# Patient Record
Sex: FEMALE | Race: WHITE | Employment: OTHER | URBAN - METROPOLITAN AREA
[De-identification: names, ages, dates, MRNs, and addresses within clinical notes are randomized per-mention and may not be internally consistent; named-entity substitution may affect disease eponyms.]

---

## 2022-12-28 ENCOUNTER — HOSPITAL ENCOUNTER (INPATIENT)
Age: 82
LOS: 4 days | Discharge: HOME | DRG: 292 | End: 2023-01-01
Attending: EMERGENCY MEDICINE | Admitting: INTERNAL MEDICINE
Payer: MEDICARE

## 2022-12-28 ENCOUNTER — APPOINTMENT (OUTPATIENT)
Dept: GENERAL RADIOLOGY | Age: 82
DRG: 292 | End: 2022-12-28
Payer: MEDICARE

## 2022-12-28 DIAGNOSIS — I50.9 NEW ONSET OF CONGESTIVE HEART FAILURE (HCC): Primary | ICD-10-CM

## 2022-12-28 DIAGNOSIS — I50.21 ACUTE SYSTOLIC CHF (CONGESTIVE HEART FAILURE), NYHA CLASS 4 (HCC): ICD-10-CM

## 2022-12-28 DIAGNOSIS — I48.91 ATRIAL FIBRILLATION, UNSPECIFIED TYPE (HCC): ICD-10-CM

## 2022-12-28 DIAGNOSIS — J90 PLEURAL EFFUSION: ICD-10-CM

## 2022-12-28 LAB
ALBUMIN SERPL-MCNC: 3.5 G/DL (ref 3.2–4.6)
ALBUMIN/GLOB SERPL: 1.1 {RATIO} (ref 0.4–1.6)
ALP SERPL-CCNC: 90 U/L (ref 50–136)
ALT SERPL-CCNC: 22 U/L (ref 12–65)
ANION GAP SERPL CALC-SCNC: 8 MMOL/L (ref 2–11)
APPEARANCE UR: CLEAR
APTT PPP: 35.8 SEC (ref 24.5–34.2)
AST SERPL-CCNC: 32 U/L (ref 15–37)
BASOPHILS # BLD: 0.2 K/UL (ref 0–0.2)
BASOPHILS NFR BLD: 1 % (ref 0–2)
BILIRUB SERPL-MCNC: 2 MG/DL (ref 0.2–1.1)
BILIRUB UR QL: NEGATIVE
BUN SERPL-MCNC: 11 MG/DL (ref 8–23)
CALCIUM SERPL-MCNC: 9.2 MG/DL (ref 8.3–10.4)
CHLORIDE SERPL-SCNC: 108 MMOL/L (ref 101–110)
CO2 SERPL-SCNC: 25 MMOL/L (ref 21–32)
COLOR UR: NORMAL
CREAT SERPL-MCNC: 1.3 MG/DL (ref 0.6–1)
DIFFERENTIAL METHOD BLD: ABNORMAL
EKG DIAGNOSIS: NORMAL
EKG Q-T INTERVAL: 382 MS
EKG QRS DURATION: 96 MS
EKG QTC CALCULATION (BAZETT): 477 MS
EKG R AXIS: 90 DEGREES
EKG T AXIS: -5 DEGREES
EKG VENTRICULAR RATE: 94 BPM
EOSINOPHIL # BLD: 0.2 K/UL (ref 0–0.8)
EOSINOPHIL NFR BLD: 1 % (ref 0.5–7.8)
ERYTHROCYTE [DISTWIDTH] IN BLOOD BY AUTOMATED COUNT: 15.5 % (ref 11.9–14.6)
ERYTHROCYTE [DISTWIDTH] IN BLOOD BY AUTOMATED COUNT: 16.2 % (ref 11.9–14.6)
GLOBULIN SER CALC-MCNC: 3.3 G/DL (ref 2.8–4.5)
GLUCOSE SERPL-MCNC: 102 MG/DL (ref 65–100)
GLUCOSE UR STRIP.AUTO-MCNC: NEGATIVE MG/DL
HCT VFR BLD AUTO: 47.3 % (ref 35.8–46.3)
HCT VFR BLD AUTO: 47.5 % (ref 35.8–46.3)
HGB BLD-MCNC: 15 G/DL (ref 11.7–15.4)
HGB BLD-MCNC: 15.1 G/DL (ref 11.7–15.4)
HGB UR QL STRIP: NEGATIVE
IMM GRANULOCYTES # BLD AUTO: 0.2 K/UL (ref 0–0.5)
IMM GRANULOCYTES NFR BLD AUTO: 1 % (ref 0–5)
INR PPP: 1.4
KETONES UR QL STRIP.AUTO: NEGATIVE MG/DL
LEUKOCYTE ESTERASE UR QL STRIP.AUTO: NEGATIVE
LYMPHOCYTES # BLD: 1.5 K/UL (ref 0.5–4.6)
LYMPHOCYTES NFR BLD: 10 % (ref 13–44)
MAGNESIUM SERPL-MCNC: 2.1 MG/DL (ref 1.8–2.4)
MCH RBC QN AUTO: 29.8 PG (ref 26.1–32.9)
MCH RBC QN AUTO: 30.1 PG (ref 26.1–32.9)
MCHC RBC AUTO-ENTMCNC: 31.7 G/DL (ref 31.4–35)
MCHC RBC AUTO-ENTMCNC: 31.8 G/DL (ref 31.4–35)
MCV RBC AUTO: 93.9 FL (ref 82–102)
MCV RBC AUTO: 94.8 FL (ref 82–102)
MONOCYTES # BLD: 1.5 K/UL (ref 0.1–1.3)
MONOCYTES NFR BLD: 10 % (ref 4–12)
NEUTS SEG # BLD: 11.6 K/UL (ref 1.7–8.2)
NEUTS SEG NFR BLD: 77 % (ref 43–78)
NITRITE UR QL STRIP.AUTO: NEGATIVE
NRBC # BLD: 0 K/UL (ref 0–0.2)
NRBC # BLD: 0 K/UL (ref 0–0.2)
NT PRO BNP: ABNORMAL PG/ML
PH UR STRIP: 5.5 [PH] (ref 5–9)
PLATELET # BLD AUTO: 520 K/UL (ref 150–450)
PLATELET # BLD AUTO: 606 K/UL (ref 150–450)
PLATELET COMMENT: ADEQUATE
PMV BLD AUTO: 11.1 FL (ref 9.4–12.3)
PMV BLD AUTO: 11.5 FL (ref 9.4–12.3)
POTASSIUM SERPL-SCNC: 3.7 MMOL/L (ref 3.5–5.1)
PROCALCITONIN SERPL-MCNC: 0.21 NG/ML (ref 0–0.49)
PROT SERPL-MCNC: 6.8 G/DL (ref 6.3–8.2)
PROT UR STRIP-MCNC: NEGATIVE MG/DL
PROTHROMBIN TIME: 17.2 SEC (ref 12.6–14.3)
RBC # BLD AUTO: 4.99 M/UL (ref 4.05–5.2)
RBC # BLD AUTO: 5.06 M/UL (ref 4.05–5.2)
RBC MORPH BLD: ABNORMAL
SODIUM SERPL-SCNC: 141 MMOL/L (ref 133–143)
SP GR UR REFRACTOMETRY: 1.01 (ref 1–1.02)
TROPONIN I SERPL HS-MCNC: 27 PG/ML (ref 0–14)
TSH, 3RD GENERATION: 0.65 UIU/ML (ref 0.36–3.74)
UROBILINOGEN UR QL STRIP.AUTO: 0.2 EU/DL (ref 0.2–1)
WBC # BLD AUTO: 15.2 K/UL (ref 4.3–11.1)
WBC # BLD AUTO: 16.2 K/UL (ref 4.3–11.1)
WBC MORPH BLD: ABNORMAL

## 2022-12-28 PROCEDURE — 96374 THER/PROPH/DIAG INJ IV PUSH: CPT | Performed by: EMERGENCY MEDICINE

## 2022-12-28 PROCEDURE — 71045 X-RAY EXAM CHEST 1 VIEW: CPT

## 2022-12-28 PROCEDURE — 99285 EMERGENCY DEPT VISIT HI MDM: CPT | Performed by: EMERGENCY MEDICINE

## 2022-12-28 PROCEDURE — 84443 ASSAY THYROID STIM HORMONE: CPT

## 2022-12-28 PROCEDURE — 84484 ASSAY OF TROPONIN QUANT: CPT

## 2022-12-28 PROCEDURE — 83880 ASSAY OF NATRIURETIC PEPTIDE: CPT

## 2022-12-28 PROCEDURE — 6360000002 HC RX W HCPCS: Performed by: PHYSICIAN ASSISTANT

## 2022-12-28 PROCEDURE — 85610 PROTHROMBIN TIME: CPT

## 2022-12-28 PROCEDURE — 85730 THROMBOPLASTIN TIME PARTIAL: CPT

## 2022-12-28 PROCEDURE — 80053 COMPREHEN METABOLIC PANEL: CPT

## 2022-12-28 PROCEDURE — 85025 COMPLETE CBC W/AUTO DIFF WBC: CPT

## 2022-12-28 PROCEDURE — 1100000000 HC RM PRIVATE

## 2022-12-28 PROCEDURE — 85027 COMPLETE CBC AUTOMATED: CPT

## 2022-12-28 PROCEDURE — 83735 ASSAY OF MAGNESIUM: CPT

## 2022-12-28 PROCEDURE — 84145 PROCALCITONIN (PCT): CPT

## 2022-12-28 PROCEDURE — 6360000002 HC RX W HCPCS: Performed by: INTERNAL MEDICINE

## 2022-12-28 PROCEDURE — 93005 ELECTROCARDIOGRAM TRACING: CPT | Performed by: PHYSICIAN ASSISTANT

## 2022-12-28 PROCEDURE — 81003 URINALYSIS AUTO W/O SCOPE: CPT

## 2022-12-28 RX ORDER — HEPARIN SODIUM 1000 [USP'U]/ML
60 INJECTION, SOLUTION INTRAVENOUS; SUBCUTANEOUS ONCE
Status: COMPLETED | OUTPATIENT
Start: 2022-12-28 | End: 2022-12-28

## 2022-12-28 RX ORDER — SODIUM CHLORIDE 9 MG/ML
INJECTION, SOLUTION INTRAVENOUS PRN
Status: DISCONTINUED | OUTPATIENT
Start: 2022-12-28 | End: 2023-01-01 | Stop reason: HOSPADM

## 2022-12-28 RX ORDER — ONDANSETRON 4 MG/1
4 TABLET, ORALLY DISINTEGRATING ORAL EVERY 8 HOURS PRN
Status: DISCONTINUED | OUTPATIENT
Start: 2022-12-28 | End: 2023-01-01 | Stop reason: HOSPADM

## 2022-12-28 RX ORDER — FUROSEMIDE 10 MG/ML
40 INJECTION INTRAMUSCULAR; INTRAVENOUS ONCE
Status: COMPLETED | OUTPATIENT
Start: 2022-12-28 | End: 2022-12-28

## 2022-12-28 RX ORDER — HEPARIN SODIUM 10000 [USP'U]/100ML
5-30 INJECTION, SOLUTION INTRAVENOUS CONTINUOUS
Status: DISCONTINUED | OUTPATIENT
Start: 2022-12-28 | End: 2022-12-31

## 2022-12-28 RX ORDER — SODIUM CHLORIDE 0.9 % (FLUSH) 0.9 %
5-40 SYRINGE (ML) INJECTION EVERY 12 HOURS SCHEDULED
Status: DISCONTINUED | OUTPATIENT
Start: 2022-12-28 | End: 2023-01-01 | Stop reason: HOSPADM

## 2022-12-28 RX ORDER — ONDANSETRON 2 MG/ML
4 INJECTION INTRAMUSCULAR; INTRAVENOUS EVERY 6 HOURS PRN
Status: DISCONTINUED | OUTPATIENT
Start: 2022-12-28 | End: 2023-01-01 | Stop reason: HOSPADM

## 2022-12-28 RX ORDER — FUROSEMIDE 10 MG/ML
40 INJECTION INTRAMUSCULAR; INTRAVENOUS DAILY
Status: DISCONTINUED | OUTPATIENT
Start: 2022-12-29 | End: 2022-12-29

## 2022-12-28 RX ORDER — ACETAMINOPHEN 325 MG/1
650 TABLET ORAL EVERY 6 HOURS PRN
Status: DISCONTINUED | OUTPATIENT
Start: 2022-12-28 | End: 2023-01-01 | Stop reason: HOSPADM

## 2022-12-28 RX ORDER — SODIUM CHLORIDE 0.9 % (FLUSH) 0.9 %
5-40 SYRINGE (ML) INJECTION PRN
Status: DISCONTINUED | OUTPATIENT
Start: 2022-12-28 | End: 2023-01-01 | Stop reason: HOSPADM

## 2022-12-28 RX ORDER — POLYETHYLENE GLYCOL 3350 17 G/17G
17 POWDER, FOR SOLUTION ORAL DAILY PRN
Status: DISCONTINUED | OUTPATIENT
Start: 2022-12-28 | End: 2023-01-01 | Stop reason: HOSPADM

## 2022-12-28 RX ORDER — ACETAMINOPHEN 650 MG/1
650 SUPPOSITORY RECTAL EVERY 6 HOURS PRN
Status: DISCONTINUED | OUTPATIENT
Start: 2022-12-28 | End: 2023-01-01 | Stop reason: HOSPADM

## 2022-12-28 RX ORDER — HEPARIN SODIUM 1000 [USP'U]/ML
60 INJECTION, SOLUTION INTRAVENOUS; SUBCUTANEOUS PRN
Status: DISCONTINUED | OUTPATIENT
Start: 2022-12-28 | End: 2022-12-31

## 2022-12-28 RX ORDER — HEPARIN SODIUM 1000 [USP'U]/ML
30 INJECTION, SOLUTION INTRAVENOUS; SUBCUTANEOUS PRN
Status: DISCONTINUED | OUTPATIENT
Start: 2022-12-28 | End: 2022-12-31

## 2022-12-28 RX ADMIN — FUROSEMIDE 40 MG: 10 INJECTION, SOLUTION INTRAMUSCULAR; INTRAVENOUS at 19:10

## 2022-12-28 RX ADMIN — HEPARIN SODIUM 3670 UNITS: 1000 INJECTION INTRAVENOUS; SUBCUTANEOUS at 23:00

## 2022-12-28 RX ADMIN — HEPARIN SODIUM 12 UNITS/KG/HR: 10000 INJECTION, SOLUTION INTRAVENOUS at 22:58

## 2022-12-28 NOTE — ED NOTES
Chief Complaint: [Leg swelling shortness of breath]     HPI: Patient to ER with approximate 2-month history of slowly worsening bilateral leg swelling. Today daughter noticed some swelling around her eyelids. She has been having progressively worsening shortness of breath. She denies any chest pain. Patient tried to see her primary care physician but states it has been more than a year they could not be seen in a timely fashion so they went to [facility] who did a BNP that was elevated and sent her here for new onset CHF patient has no previous cardiac history. Vital Signs   Patient Vitals for the past 4 hrs:   Temp Pulse Resp BP SpO2   12/28/22 1554 98.2 °F (36.8 °C) 78 18 (!) 129/91 97 %        No past medical history on file. No past surgical history on file. No family history on file. Allergies: Patient has no known allergies. Previous Medications    No medications on file          Brief PE:  GEN: [No distress.]  CV: As per triage vitals  PULM: Decreased breath sounds throughout patient will cough with deep inspiration  ABD: [No distention]  NEURO: [Awake, Alert]     Impression: New onset CHF     Plan: Baseline labs chest x-ray plan for admission    Patient evaluated initially in triage. Rapid Medical Evaluation was conducted and necessary orders have been placed. I have performed a medical screening exam.  Care will now be transferred to the provider in the back of the emergency department.   SUZIE Padgett 5:02 PM       Neha Coxma  12/28/22 [id number]

## 2022-12-28 NOTE — ED PROVIDER NOTES
[facility] Department Provider Note                   PCP:                None None               Age: 79 y.o. Sex: female       ICD-10-CM    1. New onset of congestive heart failure (HCC)  I50.9       2. Pleural effusion  J90       3. Atrial fibrillation, unspecified type [facility])  I48.91           DISPOSITION Decision To Admit 12/28/2022 06:49:58 PM       New Prescriptions    No medications on file       Orders Placed This Encounter   Procedures    XR CHEST 1 VIEW    CBC with Auto Differential    Comprehensive Metabolic Panel    Troponin    Magnesium    Brain Natriuretic Peptide    Urinalysis w rflx microscopic    EKG 12 Lead         Darius Tam is a 79 y.o. female who presents to the Emergency Department with chief complaint of    Chief Complaint   Patient presents with    Leg Swelling      Patient to ER with several week history of slowly worsening bilateral lower extremity edema and shortness of breath. Woke with some swelling to the lid this morning went to urgent care who sent here for further evaluation new onset CHF. Patient denies any chest pain no fever no nausea vomiting    No past medical history on file. No past surgical history on file. Review of Systems   All other systems reviewed and are negative. All other systems reviewed and are negative. No past medical history on file. No past surgical history on file. No family history on file. Social Connections: Not on file        No Known Allergies     Vitals signs and nursing note reviewed. Patient Vitals for the past 4 hrs:   Temp Pulse Resp BP SpO2   12/28/22 1901 -- 94 22 108/88 95 %   12/28/22 1843 -- 86 19 135/82 95 %   12/28/22 1833 -- 95 25 (!) 155/74 96 %   12/28/22 1554 98.2 °F (36.8 °C) 78 18 (!) 129/91 97 %          Physical Exam  Vitals and nursing note reviewed. Constitutional:       Appearance: Normal appearance. She is normal weight. HENT:      Head: Normocephalic and atraumatic. Right Ear: Tympanic membrane and external ear normal.      Left Ear: Tympanic membrane and external ear normal.      Nose: Nose normal.      Mouth/Throat:      Mouth: Mucous membranes are moist.      Pharynx: Oropharynx is clear. Eyes:      Extraocular Movements: Extraocular movements intact. Pupils: Pupils are equal, round, and reactive to light. Comments: edema to right upper and lower lids no redness noted   Cardiovascular:      Rate and Rhythm: Tachycardia present. Rhythm irregular. Pulses: Normal pulses. Heart sounds: Normal heart sounds. Pulmonary:      Effort: Pulmonary effort is normal.      Breath sounds: Normal breath sounds. No rales. Chest:      Chest wall: No tenderness. Abdominal:      General: Abdomen is flat. Palpations: Abdomen is soft. Tenderness: There is no abdominal tenderness. Hernia: No hernia is present. Musculoskeletal:         General: Swelling present. No tenderness. Normal range of motion. Cervical back: Normal range of motion and neck supple. Comments: Bilateral lower extremities with +2 edema up to the knee no open areas   Skin:     General: Skin is warm and dry. Neurological:      General: No focal deficit present. Mental Status: She is alert and oriented to person, place, and time.    Psychiatric:         Mood and Affect: Mood normal.         Behavior: Behavior normal.        MDM  Number of Diagnoses or Management Options  Atrial fibrillation, unspecified type ([address]  New onset of congestive heart failure (HCC)  Pleural effusion  Diagnosis management comments: Labs Reviewed  CBC WITH AUTO DIFFERENTIAL - Abnormal; Notable for the following components:     WBC                           15.2 (*)               Hematocrit                    47.5 (*)               RDW                           16.2 (*)               Platelets                     520 (*)                Lymphocytes                   10 (*)                 Segs Absolute                 11.6 (*)               Absolute Mono #               1.5 (*)             All other components within normal limits  COMPREHENSIVE METABOLIC PANEL - Abnormal; Notable for the following components:     Glucose                       102 (*)                Creatinine                    1.30 (*)               Est, Glom Filt Rate           41 (*)                 Total Bilirubin               2.0 (*)             All other components within normal limits  TROPONIN - Abnormal; Notable for the following components:     Troponin, High Sensitivity    27.0 (*)            All other components within normal limits  BRAIN NATRIURETIC PEPTIDE - Abnormal; Notable for the following components:     NT Pro-BNP                    20,584 (*)            All other components within normal limits  MAGNESIUM     XR CHEST 1 VIEW   Final Result    Large right pleural effusion      EKG interpretation with atrial fib at 94 bpm no signs of acute MI no previous for comparison    New onset CHF with atrial fib patient discussed with Dr. Claudia Smallwood  and hospitalist for admission    Will check urine, 40 mg lasix iv       Amount and/or Complexity of Data Reviewed  Clinical lab tests: ordered and reviewed  Tests in the radiology section of CPT®: ordered and reviewed  Review and summarize past medical records: yes  Discuss the patient with other providers: yes  Independent visualization of images, tracings, or specimens: yes    Risk of Complications, Morbidity, and/or Mortality  Presenting problems: high  Diagnostic procedures: high  Management options: high    Critical Care  Total time providing critical care: 30-74 minutes    Patient Progress  Patient progress: improved      Procedures    Labs Reviewed   CBC WITH AUTO DIFFERENTIAL - Abnormal; Notable for the following components:       Result Value    WBC 15.2 (*)     Hematocrit 47.5 (*)     RDW 16.2 (*)     Platelets 712 (*)     Lymphocytes 10 (*)     Segs Absolute 11.6 (*) Absolute Mono # 1.5 (*)     All other components within normal limits   COMPREHENSIVE METABOLIC PANEL - Abnormal; Notable for the following components:    Glucose 102 (*)     Creatinine 1.30 (*)     Est, Glom Filt Rate 41 (*)     Total Bilirubin 2.0 (*)     All other components within normal limits   TROPONIN - Abnormal; Notable for the following components:    Troponin, High Sensitivity 27.0 (*)     All other components within normal limits   BRAIN NATRIURETIC PEPTIDE - Abnormal; Notable for the following components:    NT Pro-BNP 20,584 (*)     All other components within normal limits   MAGNESIUM   URINALYSIS        XR CHEST 1 VIEW   Final Result   Large right pleural effusion                  [place] Coma Scale  Eye Opening: Spontaneous  Best Verbal Response: Oriented  Best Motor Response: Obeys commands  Dom Coma Scale Score: 15                     Voice dictation software was used during the making of this note. This software is not perfect and grammatical and other typographical errors may be present. This note has not been completely proofread for errors.      SUZIE Tong  12/28/22 [address] [facility] [place]  12/28/22 [address] [facility] [place]  12/28/22 Jasmeet Bryant

## 2022-12-29 ENCOUNTER — APPOINTMENT (OUTPATIENT)
Dept: NON INVASIVE DIAGNOSTICS | Age: 82
DRG: 292 | End: 2022-12-29
Payer: MEDICARE

## 2022-12-29 LAB
ALBUMIN SERPL-MCNC: 3.3 G/DL (ref 3.2–4.6)
ALBUMIN/GLOB SERPL: 1.1 {RATIO} (ref 0.4–1.6)
ALP SERPL-CCNC: 88 U/L (ref 50–136)
ALT SERPL-CCNC: 20 U/L (ref 12–65)
ANION GAP SERPL CALC-SCNC: 8 MMOL/L (ref 2–11)
AST SERPL-CCNC: 26 U/L (ref 15–37)
BASOPHILS # BLD: 0.2 K/UL (ref 0–0.2)
BASOPHILS NFR BLD: 1 % (ref 0–2)
BILIRUB SERPL-MCNC: 1.8 MG/DL (ref 0.2–1.1)
BUN SERPL-MCNC: 11 MG/DL (ref 8–23)
CALCIUM SERPL-MCNC: 9.1 MG/DL (ref 8.3–10.4)
CHLORIDE SERPL-SCNC: 110 MMOL/L (ref 101–110)
CO2 SERPL-SCNC: 24 MMOL/L (ref 21–32)
CREAT SERPL-MCNC: 1.3 MG/DL (ref 0.6–1)
DIFFERENTIAL METHOD BLD: ABNORMAL
ECHO AO ROOT DIAM: 2.8 CM
ECHO AO ROOT INDEX: 1.73 CM/M2
ECHO AV AREA PEAK VELOCITY: 1.6 CM2
ECHO AV AREA VTI: 1.7 CM2
ECHO AV AREA/BSA PEAK VELOCITY: 1 CM2/M2
ECHO AV AREA/BSA VTI: 1 CM2/M2
ECHO AV MEAN GRADIENT: 4 MMHG
ECHO AV MEAN VELOCITY: 1 M/S
ECHO AV PEAK GRADIENT: 7 MMHG
ECHO AV PEAK VELOCITY: 1.3 M/S
ECHO AV VELOCITY RATIO: 0.62
ECHO AV VTI: 19.4 CM
ECHO AV VTI: 19.4 CM
ECHO BSA: 1.64 M2
ECHO EST RA PRESSURE: 15 MMHG
ECHO IVC PROX: 1.8 CM
ECHO LA AREA 2C: 21.7 CM2
ECHO LA AREA 4C: 20.5 CM2
ECHO LA DIAMETER INDEX: 3.09 CM/M2
ECHO LA DIAMETER: 5 CM
ECHO LA MAJOR AXIS: 5.8 CM
ECHO LA MINOR AXIS: 5.8 CM
ECHO LA TO AORTIC ROOT RATIO: 1.79
ECHO LA VOL 2C: 66 ML (ref 22–52)
ECHO LA VOL 4C: 57 ML (ref 22–52)
ECHO LA VOL BP: 61 ML (ref 22–52)
ECHO LA VOL/BSA BIPLANE: 38 ML/M2 (ref 16–34)
ECHO LA VOLUME INDEX A2C: 41 ML/M2 (ref 16–34)
ECHO LA VOLUME INDEX A4C: 35 ML/M2 (ref 16–34)
ECHO LV E' LATERAL VELOCITY: 12 CM/S
ECHO LV E' SEPTAL VELOCITY: 6 CM/S
ECHO LV EDV A2C: 143 ML
ECHO LV EDV A4C: 144 ML
ECHO LV EDV INDEX A4C: 89 ML/M2
ECHO LV EDV NDEX A2C: 88 ML/M2
ECHO LV EJECTION FRACTION A2C: 8 %
ECHO LV EJECTION FRACTION A4C: 26 %
ECHO LV EJECTION FRACTION BIPLANE: 13 % (ref 55–100)
ECHO LV ESV A2C: 132 ML
ECHO LV ESV A4C: 107 ML
ECHO LV ESV INDEX A2C: 81 ML/M2
ECHO LV ESV INDEX A4C: 66 ML/M2
ECHO LV FRACTIONAL SHORTENING: 12 % (ref 28–44)
ECHO LV INTERNAL DIMENSION DIASTOLE INDEX: 3.09 CM/M2
ECHO LV INTERNAL DIMENSION DIASTOLIC: 5 CM (ref 3.9–5.3)
ECHO LV INTERNAL DIMENSION SYSTOLIC INDEX: 2.72 CM/M2
ECHO LV INTERNAL DIMENSION SYSTOLIC: 4.4 CM
ECHO LV IVSD: 0.8 CM (ref 0.6–0.9)
ECHO LV MASS 2D: 146.8 G (ref 67–162)
ECHO LV MASS INDEX 2D: 90.6 G/M2 (ref 43–95)
ECHO LV POSTERIOR WALL DIASTOLIC: 0.9 CM (ref 0.6–0.9)
ECHO LV RELATIVE WALL THICKNESS RATIO: 0.36
ECHO LVOT AREA: 2.5 CM2
ECHO LVOT DIAM: 1.8 CM
ECHO LVOT MEAN GRADIENT: 1 MMHG
ECHO LVOT PEAK GRADIENT: 3 MMHG
ECHO LVOT PEAK VELOCITY: 0.8 M/S
ECHO LVOT STROKE VOLUME INDEX: 20.9 ML/M2
ECHO LVOT SV: 33.8 ML
ECHO LVOT VTI: 13.3 CM
ECHO MV REGURGITANT PEAK GRADIENT: 100 MMHG
ECHO MV REGURGITANT PEAK VELOCITY: 5 M/S
ECHO MV REGURGITANT VTIA: 146 CM
ECHO PV ACCELERATION TIME (AT): 103 MS
ECHO PV MAX VELOCITY: 0.9 M/S
ECHO PV PEAK GRADIENT: 3 MMHG
ECHO RIGHT VENTRICULAR SYSTOLIC PRESSURE (RVSP): 47 MMHG
ECHO RV BASAL DIMENSION: 4 CM
ECHO RV FREE WALL PEAK S': 6 CM/S
ECHO RV TAPSE: 1.4 CM (ref 1.7–?)
ECHO TV REGURGITANT MAX VELOCITY: 2.81 M/S
ECHO TV REGURGITANT PEAK GRADIENT: 32 MMHG
EOSINOPHIL # BLD: 0.1 K/UL (ref 0–0.8)
EOSINOPHIL NFR BLD: 1 % (ref 0.5–7.8)
ERYTHROCYTE [DISTWIDTH] IN BLOOD BY AUTOMATED COUNT: 15.9 % (ref 11.9–14.6)
GLOBULIN SER CALC-MCNC: 2.9 G/DL (ref 2.8–4.5)
GLUCOSE SERPL-MCNC: 95 MG/DL (ref 65–100)
HCT VFR BLD AUTO: 44.9 % (ref 35.8–46.3)
HGB BLD-MCNC: 14.2 G/DL (ref 11.7–15.4)
IMM GRANULOCYTES # BLD AUTO: 0.1 K/UL (ref 0–0.5)
IMM GRANULOCYTES NFR BLD AUTO: 0 % (ref 0–5)
LYMPHOCYTES # BLD: 1.8 K/UL (ref 0.5–4.6)
LYMPHOCYTES NFR BLD: 12 % (ref 13–44)
MCH RBC QN AUTO: 29.9 PG (ref 26.1–32.9)
MCHC RBC AUTO-ENTMCNC: 31.6 G/DL (ref 31.4–35)
MCV RBC AUTO: 94.5 FL (ref 82–102)
MONOCYTES # BLD: 1.8 K/UL (ref 0.1–1.3)
MONOCYTES NFR BLD: 12 % (ref 4–12)
NEUTS SEG # BLD: 10.8 K/UL (ref 1.7–8.2)
NEUTS SEG NFR BLD: 73 % (ref 43–78)
NRBC # BLD: 0 K/UL (ref 0–0.2)
PLATELET # BLD AUTO: 586 K/UL (ref 150–450)
PMV BLD AUTO: 11 FL (ref 9.4–12.3)
POTASSIUM SERPL-SCNC: 3.8 MMOL/L (ref 3.5–5.1)
PROT SERPL-MCNC: 6.2 G/DL (ref 6.3–8.2)
RBC # BLD AUTO: 4.75 M/UL (ref 4.05–5.2)
SODIUM SERPL-SCNC: 142 MMOL/L (ref 133–143)
UFH PPP CHRO-ACNC: 0.31 IU/ML (ref 0.3–0.7)
UFH PPP CHRO-ACNC: <0.1 IU/ML (ref 0.3–0.7)
WBC # BLD AUTO: 14.8 K/UL (ref 4.3–11.1)

## 2022-12-29 PROCEDURE — 97161 PT EVAL LOW COMPLEX 20 MIN: CPT

## 2022-12-29 PROCEDURE — A4216 STERILE WATER/SALINE, 10 ML: HCPCS | Performed by: STUDENT IN AN ORGANIZED HEALTH CARE EDUCATION/TRAINING PROGRAM

## 2022-12-29 PROCEDURE — 6360000002 HC RX W HCPCS: Performed by: INTERNAL MEDICINE

## 2022-12-29 PROCEDURE — C8929 TTE W OR WO FOL WCON,DOPPLER: HCPCS

## 2022-12-29 PROCEDURE — 36415 COLL VENOUS BLD VENIPUNCTURE: CPT

## 2022-12-29 PROCEDURE — 1100000000 HC RM PRIVATE

## 2022-12-29 PROCEDURE — 80053 COMPREHEN METABOLIC PANEL: CPT

## 2022-12-29 PROCEDURE — 85520 HEPARIN ASSAY: CPT

## 2022-12-29 PROCEDURE — 2580000003 HC RX 258: Performed by: STUDENT IN AN ORGANIZED HEALTH CARE EDUCATION/TRAINING PROGRAM

## 2022-12-29 PROCEDURE — 85025 COMPLETE CBC W/AUTO DIFF WBC: CPT

## 2022-12-29 PROCEDURE — 97530 THERAPEUTIC ACTIVITIES: CPT

## 2022-12-29 PROCEDURE — 2580000003 HC RX 258: Performed by: INTERNAL MEDICINE

## 2022-12-29 PROCEDURE — 6360000004 HC RX CONTRAST MEDICATION: Performed by: STUDENT IN AN ORGANIZED HEALTH CARE EDUCATION/TRAINING PROGRAM

## 2022-12-29 PROCEDURE — 6370000000 HC RX 637 (ALT 250 FOR IP): Performed by: INTERNAL MEDICINE

## 2022-12-29 PROCEDURE — 93306 TTE W/DOPPLER COMPLETE: CPT | Performed by: INTERNAL MEDICINE

## 2022-12-29 RX ORDER — METOPROLOL SUCCINATE 25 MG/1
25 TABLET, EXTENDED RELEASE ORAL DAILY
Status: DISCONTINUED | OUTPATIENT
Start: 2022-12-29 | End: 2022-12-30

## 2022-12-29 RX ORDER — FUROSEMIDE 10 MG/ML
40 INJECTION INTRAMUSCULAR; INTRAVENOUS 2 TIMES DAILY
Status: DISCONTINUED | OUTPATIENT
Start: 2022-12-29 | End: 2022-12-30

## 2022-12-29 RX ADMIN — HEPARIN SODIUM 3670 UNITS: 1000 INJECTION INTRAVENOUS; SUBCUTANEOUS at 05:55

## 2022-12-29 RX ADMIN — METOPROLOL SUCCINATE 25 MG: 25 TABLET, EXTENDED RELEASE ORAL at 16:29

## 2022-12-29 RX ADMIN — FUROSEMIDE 40 MG: 10 INJECTION, SOLUTION INTRAMUSCULAR; INTRAVENOUS at 08:37

## 2022-12-29 RX ADMIN — SODIUM CHLORIDE, PRESERVATIVE FREE 10 ML: 5 INJECTION INTRAVENOUS at 21:46

## 2022-12-29 RX ADMIN — HEPARIN SODIUM 3670 UNITS: 1000 INJECTION INTRAVENOUS; SUBCUTANEOUS at 18:59

## 2022-12-29 RX ADMIN — SODIUM CHLORIDE, PRESERVATIVE FREE 10 ML: 5 INJECTION INTRAVENOUS at 08:41

## 2022-12-29 RX ADMIN — FUROSEMIDE 40 MG: 10 INJECTION, SOLUTION INTRAMUSCULAR; INTRAVENOUS at 17:38

## 2022-12-29 RX ADMIN — PERFLUTREN 0.45 ML: 6.52 INJECTION, SUSPENSION INTRAVENOUS at 11:58

## 2022-12-29 NOTE — ED NOTES
TRANSFER - OUT REPORT:    Verbal report given to Efra millard RN on Gaye Smith  being transferred to [facility] [phone] for routine progression of patient care       Report consisted of patient's Situation, Background, Assessment and   Recommendations(SBAR). Information from the following report(s) Nurse Handoff Report, ED Encounter Summary, ED SBAR, Adult Overview, Recent Results, and Cardiac Rhythm afib  was reviewed with the receiving nurse. [place] Assessment: Presents to emergency department  because of falls (Syncope, seizure, or loss of consciousness): No, Age > 79: Yes, Altered Mental Status, Intoxication with alcohol or substance confusion (Disorientation, impaired judgment, poor safety awaremess, or inability to follow instructions): Yes, Impaired Mobility: Ambulates or transfers with assistive devices or assistance; Unable to ambulate or transer.: Yes, Nursing Judgement: Yes  Lines:   Peripheral IV 12/28/22 Right;Upper Arm (Active)       Peripheral IV 12/28/22 Right Forearm (Active)   Site Assessment Clean, dry & intact 12/28/22 2222   Line Status Blood return noted; Flushed 12/28/22 2222   Line Care Connections checked and tightened 12/28/22 2222   Phlebitis Assessment No symptoms 12/28/22 2222   Infiltration Assessment 0 12/28/22 2222   Alcohol Cap Used Yes 12/28/22 2222   Dressing Status New dressing applied 12/28/22 2222   Dressing Type Transparent 12/28/22 2222   Dressing Intervention New 12/28/22 2222        Opportunity for questions and clarification was provided.       Patient transported with:  Monitor and Registered Nurse          Gilma Hsieh RN  12/28/22 3423

## 2022-12-29 NOTE — PROGRESS NOTES
Hospitalist Progress Note   Admit Date:  2022  4:39 PM   Name:  Chris Delvalle   Age:  79 y.o. Sex:  female  :  1940   MRN:  [id number]   Room:      Presenting Complaint: Leg Swelling     Reason(s) for Admission: Pleural effusion [R58]  Acute systolic CHF (congestive heart failure), NYHA class 4 (HCC) [I50.21]  Atrial fibrillation, unspecified type ([facility] [address] [I48.91]  New onset of congestive heart failure ([address] [I50.9]     Hospital Course:   Patient is an 78 y/o female with no past medical history (no PCP/MD evaluation in > 15 years), no home medications who presented to ED from Urgent Care with cc SOB, BLE edema, elevated BNP. ED workup: /91 HR 78 stable on RA  EKG with controlled atrial fibrillation  Labs notable for WBC 15.2, Hgb/Hct 15.1/47.5, Plt 520, Na 141, K 3.7, BUN/Cr 11/1.30, pBNP 83569, hs trop 27, TSH 0.649, procal 0.21  CXR showed large right pleural effusion. Patient was given IV Lasix 40 mg in ED and initiated on Heparin gtt. Hospitalist consulted for admission due to suspected new onset acute heart failure. Echo ordered. Cardiology and Pulmonology consulted as well. Subjective & 24hr Events (22): Patient alert and oriented, sitting upright in bed. Family present at bedside. She is breathing better, voiding a lot, and feeling overall improved. Tolerating oral diet. Discussed plan of care in depth. Denies chest pain and SOB.     Assessment & Plan:     Acute systolic CHF, NYHA class 4 / New onset   -Suspected new diagnosis, Echo obtained and pending  -Currently on Lasix 40 mg IV BID and Toprol  -Cardiology following, appreciate recommendations  -Strict I&O's, daily weights  -Will need heart failure education if confirmed on Echo    Atrial fibrillation ([facility] [address]  -Monitor on telemetry, currently rate controlled, Toprol added today, remains on Heparin gtt to facilitate procedures, transition to [facility] on discharge  -Cardiology following, appreciate recommendations    Large R Pleural effusion  -Pulmonology consultation, possible thoracentesis in am, continue diuresis    Discharge planning: Home with Erasmo Yu when medically stable, hopeful 24-48 hours    Diet:  ADULT DIET; Regular; Low Sodium (2 gm)  DVT PPx: Heparin gtt  Code status: Full Code    Hospital Problems:  Principal Problem:    Acute systolic CHF (congestive heart failure), NYHA class 4 (HCC)  Active Problems:    New onset of congestive heart failure (HCC)    Atrial fibrillation (HCC)    Pleural effusion  Resolved Problems:    * No resolved hospital problems. *      Objective:   Patient Vitals for the past 24 hrs:   Temp Pulse Resp BP SpO2   12/29/22 1152 -- -- -- 129/83 --   12/29/22 1126 97.7 °F (36.5 °C) (!) 111 18 (!) 141/89 96 %   12/29/22 0728 98.6 °F (37 °C) 90 18 102/71 90 %   12/29/22 0715 -- 83 -- -- --   12/29/22 0250 98.2 °F (36.8 °C) 94 16 125/74 93 %   12/29/22 0021 97.9 °F (36.6 °C) (!) 109 18 126/76 95 %   12/29/22 0020 -- (!) 108 18 -- --   12/28/22 2328 -- 100 23 (!) 123/57 94 %   12/28/22 2318 -- 92 24 122/78 95 %   12/28/22 2304 -- 93 24 (!) 119/51 95 %   12/28/22 2244 -- 96 27 112/66 94 %   12/28/22 2234 -- 95 21 104/63 94 %   12/28/22 2212 -- (!) 101 14 120/71 95 %   12/28/22 2016 -- -- -- 135/79 97 %   12/28/22 1928 -- 92 25 (!) 134/91 95 %   12/28/22 1901 -- 94 22 108/88 95 %   12/28/22 1843 -- 86 19 135/82 95 %   12/28/22 1833 -- 95 25 (!) 155/74 96 %   12/28/22 1554 98.2 °F (36.8 °C) 78 18 (!) 129/91 97 %       Oxygen Therapy  SpO2: 96 %  Pulse Oximeter Device Mode: Continuous  O2 Device: None (Room air)    Estimated body mass index is 24.69 kg/m² as calculated from the following:    Height as of this encounter: 5' 2\" (1.575 m). Weight as of this encounter: 135 lb (61.2 kg).     Intake/Output Summary (Last 24 hours) at 12/29/2022 1535  Last data filed at 12/29/2022 1127  Gross per 24 hour   Intake 350 ml   Output 1320 ml   Net -970 ml         Physical Exam:     Blood pressure 129/83, pulse (!) 111, temperature 97.7 °F (36.5 °C), temperature source Oral, resp. rate 18, height 5' 2\" (1.575 m), weight 135 lb (61.2 kg), SpO2 96 %. General:    Alert, frail, no acute distress   Head:  Normocephalic, atraumatic  Eyes:  Sclerae appear normal.  Pupils equally round. ENT:  Nares appear normal, no drainage. Moist oral mucosa  Neck:  No restricted ROM. Trachea midline   CV:   IRR. No m/r/g. JVD present  Lungs:   CTAB posterior, diminished RLL, no wheezing, no rhonchi, cough with deep inspiration. Respirations even and unlabored on RA. Abdomen: Bowel sounds present. Soft, nontender, nondistended. Extremities: No cyanosis or clubbing. BLE edema +2  Skin:     No rashes and normal coloration. Warm and dry. Neuro:  CN II-XII grossly intact. Sensation intact. A&Ox3. Follows commands. Facial tremor noted. Psych:  Normal mood and affect.       I have personally reviewed labs and tests showing:  Recent Labs:  Recent Results (from the past 48 hour(s))   EKG 12 Lead    Collection Time: 12/28/22  4:42 PM   Result Value Ref Range    Ventricular Rate 94 BPM    QRS Duration 96 ms    Q-T Interval 382 ms    QTc Calculation (Bazett) 477 ms    R Axis 90 degrees    T Axis -5 degrees    Diagnosis Atrial fibrillation    CBC with Auto Differential    Collection Time: 12/28/22  4:45 PM   Result Value Ref Range    WBC 15.2 (H) 4.3 - 11.1 K/uL    RBC 5.06 4.05 - 5.2 M/uL    Hemoglobin 15.1 11.7 - 15.4 g/dL    Hematocrit 47.5 (H) 35.8 - 46.3 %    MCV 93.9 82 - 102 FL    MCH 29.8 26.1 - 32.9 PG    MCHC 31.8 31.4 - 35.0 g/dL    RDW 16.2 (H) 11.9 - 14.6 %    Platelets 740 (H) 513 - 450 K/uL    MPV 11.5 9.4 - 12.3 FL    nRBC 0.00 0.0 - 0.2 K/uL    Seg Neutrophils 77 43 - 78 %    Lymphocytes 10 (L) 13 - 44 %    Monocytes 10 4.0 - 12.0 %    Eosinophils % 1 0.5 - 7.8 %    Basophils 1 0.0 - 2.0 %    Immature Granulocytes 1 0.0 - 5.0 %    Segs Absolute 11.6 (H) 1.7 - 8.2 K/UL    Absolute Lymph # 1.5 0.5 - 4.6 K/UL English Absolute Mono # 1.5 (H) 0.1 - 1.3 K/UL    Absolute Eos # 0.2 0.0 - 0.8 K/UL    Basophils Absolute 0.2 0.0 - 0.2 K/UL    Absolute Immature Granulocyte 0.2 0.0 - 0.5 K/UL    RBC Comment SLIGHT  HYPOCHROMIA        WBC Comment Result Confirmed By Smear      Platelet Comment ADEQUATE      Differential Type AUTOMATED     Comprehensive Metabolic Panel    Collection Time: 12/28/22  4:45 PM   Result Value Ref Range    Sodium 141 133 - 143 mmol/L    Potassium 3.7 3.5 - 5.1 mmol/L    Chloride 108 101 - 110 mmol/L    CO2 25 21 - 32 mmol/L    Anion Gap 8 2 - 11 mmol/L    Glucose 102 (H) 65 - 100 mg/dL    BUN 11 8 - 23 MG/DL    Creatinine 1.30 (H) 0.6 - 1.0 MG/DL    Est, Glom Filt Rate 41 (L) >60 ml/min/1.73m2    Calcium 9.2 8.3 - 10.4 MG/DL    Total Bilirubin 2.0 (H) 0.2 - 1.1 MG/DL    ALT 22 12 - 65 U/L    AST 32 15 - 37 U/L    Alk Phosphatase 90 50 - 136 U/L    Total Protein 6.8 6.3 - 8.2 g/dL    Albumin 3.5 3.2 - 4.6 g/dL    Globulin 3.3 2.8 - 4.5 g/dL    Albumin/Globulin Ratio 1.1 0.4 - 1.6     Troponin    Collection Time: 12/28/22  4:45 PM   Result Value Ref Range    Troponin, High Sensitivity 27.0 (H) 0 - 14 pg/mL   Magnesium    Collection Time: 12/28/22  4:45 PM   Result Value Ref Range    Magnesium 2.1 1.8 - 2.4 mg/dL   Brain Natriuretic Peptide    Collection Time: 12/28/22  4:45 PM   Result Value Ref Range    NT Pro-BNP 20,584 (H) <450 PG/ML   Urinalysis w rflx microscopic    Collection Time: 12/28/22  7:57 PM   Result Value Ref Range    Color, UA YELLOW/STRAW      Appearance CLEAR      Specific Gravity, UA 1.008 1.001 - 1.023      pH, Urine 5.5 5.0 - 9.0      Protein, UA Negative NEG mg/dL    Glucose, UA Negative mg/dL    Ketones, Urine Negative NEG mg/dL    Bilirubin Urine Negative NEG      Blood, Urine Negative NEG      Urobilinogen, Urine 0.2 0.2 - 1.0 EU/dL    Nitrite, Urine Negative NEG      Leukocyte Esterase, Urine Negative NEG     TSH    Collection Time: 12/28/22 10:08 PM   Result Value Ref Range    TSH, 3RD GENERATION 0.649 0.358 - 3.740 uIU/mL   Procalcitonin    Collection Time: 12/28/22 10:08 PM   Result Value Ref Range    Procalcitonin 0.21 0.00 - 0.49 ng/mL   CBC    Collection Time: 12/28/22 10:08 PM   Result Value Ref Range    WBC 16.2 (H) 4.3 - 11.1 K/uL    RBC 4.99 4.05 - 5.2 M/uL    Hemoglobin 15.0 11.7 - 15.4 g/dL    Hematocrit 47.3 (H) 35.8 - 46.3 %    MCV 94.8 82 - 102 FL    MCH 30.1 26.1 - 32.9 PG    MCHC 31.7 31.4 - 35.0 g/dL    RDW 15.5 (H) 11.9 - 14.6 %    Platelets 576 (H) 736 - 450 K/uL    MPV 11.1 9.4 - 12.3 FL    nRBC 0.00 0.0 - 0.2 K/uL   APTT    Collection Time: 12/28/22 10:08 PM   Result Value Ref Range    PTT 35.8 (H) 24.5 - 34.2 SEC   Protime-INR    Collection Time: 12/28/22 10:08 PM   Result Value Ref Range    Protime 17.2 (H) 12.6 - 14.3 sec    INR 1.4     Anti-Xa, Unfractionated Heparin    Collection Time: 12/29/22  3:25 AM   Result Value Ref Range    Anti-XA Unfrac Heparin <0.1 (L) 0.3 - 0.7 IU/mL   Comprehensive Metabolic Panel w/ Reflex to MG    Collection Time: 12/29/22  3:25 AM   Result Value Ref Range    Sodium 142 133 - 143 mmol/L    Potassium 3.8 3.5 - 5.1 mmol/L    Chloride 110 101 - 110 mmol/L    CO2 24 21 - 32 mmol/L    Anion Gap 8 2 - 11 mmol/L    Glucose 95 65 - 100 mg/dL    BUN 11 8 - 23 MG/DL    Creatinine 1.30 (H) 0.6 - 1.0 MG/DL    Est, Glom Filt Rate 41 (L) >60 ml/min/1.73m2    Calcium 9.1 8.3 - 10.4 MG/DL    Total Bilirubin 1.8 (H) 0.2 - 1.1 MG/DL    ALT 20 12 - 65 U/L    AST 26 15 - 37 U/L    Alk Phosphatase 88 50 - 136 U/L    Total Protein 6.2 (L) 6.3 - 8.2 g/dL    Albumin 3.3 3.2 - 4.6 g/dL    Globulin 2.9 2.8 - 4.5 g/dL    Albumin/Globulin Ratio 1.1 0.4 - 1.6     CBC with Auto Differential    Collection Time: 12/29/22  3:25 AM   Result Value Ref Range    WBC 14.8 (H) 4.3 - 11.1 K/uL    RBC 4.75 4.05 - 5.2 M/uL    Hemoglobin 14.2 11.7 - 15.4 g/dL    Hematocrit 44.9 35.8 - 46.3 %    MCV 94.5 82 - 102 FL    MCH 29.9 26.1 - 32.9 PG    MCHC 31.6 31.4 - 35.0 g/dL    RDW 15.9 (H) 11.9 - 14.6 %    Platelets 466 (H) 336 - 450 K/uL    MPV 11.0 9.4 - 12.3 FL    nRBC 0.00 0.0 - 0.2 K/uL    Differential Type AUTOMATED      Seg Neutrophils 73 43 - 78 %    Lymphocytes 12 (L) 13 - 44 %    Monocytes 12 4.0 - 12.0 %    Eosinophils % 1 0.5 - 7.8 %    Basophils 1 0.0 - 2.0 %    Immature Granulocytes 0 0.0 - 5.0 %    Segs Absolute 10.8 (H) 1.7 - 8.2 K/UL    Absolute Lymph # 1.8 0.5 - 4.6 K/UL    Absolute Mono # 1.8 (H) 0.1 - 1.3 K/UL    Absolute Eos # 0.1 0.0 - 0.8 K/UL    Basophils Absolute 0.2 0.0 - 0.2 K/UL    Absolute Immature Granulocyte 0.1 0.0 - 0.5 K/UL   Anti-Xa, Unfractionated Heparin    Collection Time: 12/29/22 10:06 AM   Result Value Ref Range    Anti-XA Unfrac Heparin <0.10 (L) 0.3 - 0.7 IU/mL   Transthoracic echocardiogram (TTE) complete with contrast, bubble, strain, and 3D PRN    Collection Time: 12/29/22 11:59 AM   Result Value Ref Range    LV EDV A2C 143 mL    LV EDV A4C 144 mL    LV ESV A2C 132 mL    LV ESV A4C 107 mL    IVSd 0.8 0.6 - 0.9 cm    LVIDd 5.0 3.9 - 5.3 cm    LVIDs 4.4 cm    LVOT Diameter 1.8 cm    LVOT Mean Gradient 1 mmHg    LVOT VTI 13.3 cm    LVOT Peak Velocity 0.8 m/s    LVOT Peak Gradient 3 mmHg    LVPWd 0.9 0.6 - 0.9 cm    LV E' Lateral Velocity 12 cm/s    LV E' Septal Velocity 6 cm/s    LV Ejection Fraction A2C 8 %    LV Ejection Fraction A4C 26 %    EF BP 13 (A) 55 - 100 %    LVOT Area 2.5 cm2    LVOT SV 33.8 ml    LA Minor Axis 5.8 cm    LA Major Axis 5.8 cm    LA Area 2C 21.7 cm2    LA Area 4C 20.5 cm2    LA Volume 2C 66 (A) 22 - 52 mL    LA Volume 4C 57 (A) 22 - 52 mL    LA Volume BP 61 (A) 22 - 52 mL    LA Diameter 5.0 cm    AV Mean Velocity 1.0 m/s    AV Mean Gradient 4 mmHg    AV VTI 19.4 cm    AV VTI 19.4 cm    AV Peak Velocity 1.3 m/s    AV Peak Gradient 7 mmHg    AV Area by VTI 1.7 cm2    AV Area by Peak Velocity 1.6 cm2    Aortic Root 2.8 cm    IVC Proxmal 1.8 cm    MR .0 cm    MR Peak Velocity 5.0 m/s    MR Peak Gradient 100 mmHg    PV AT 103.0 ms    PV Max Velocity 0.9 m/s    PV Peak Gradient 3 mmHg    RV Basal Dimension 4.0 cm    RV Free Wall Peak S' 6 cm/s    TAPSE 1.4 (A) 1.7 cm    TR Max Velocity 2.81 m/s    TR Peak Gradient 32 mmHg    Body Surface Area 1.64 m2    Fractional Shortening 2D 12 28 - 44 %    LV ESV Index A4C 66 mL/m2    LV EDV Index A4C 89 mL/m2    LV ESV Index A2C 81 mL/m2    LV EDV Index A2C 88 mL/m2    LVIDd Index 3.09 cm/m2    LVIDs Index 2.72 cm/m2    LV RWT Ratio 0.36     LV Mass 2D 146.8 67 - 162 g    LV Mass 2D Index 90.6 43 - 95 g/m2    LA Volume Index BP 38 (A) 16 - 34 ml/m2    LVOT Stroke Volume Index 20.9 mL/m2    LA Volume Index 2C 41 (A) 16 - 34 mL/m2    LA Volume Index 4C 35 (A) 16 - 34 mL/m2    LA Size Index 3.09 cm/m2    LA/AO Root Ratio 1.79     Ao Root Index 1.73 cm/m2    AV Velocity Ratio 0.62     KODAK/BSA VTI 1.0 cm2/m2    KODAK/BSA Peak Velocity 1.0 cm2/m2    Est. RA Pressure 15 mmHg    RVSP 47 mmHg       I have personally reviewed imaging studies showing:   Other Studies:  XR CHEST 1 VIEW   Final Result   Large right pleural effusion             Current Meds:  Current Facility-Administered Medications   Medication Dose Route Frequency    furosemide (LASIX) injection 40 mg  40 mg IntraVENous BID    metoprolol succinate (TOPROL XL) extended release tablet 25 mg  25 mg Oral Daily    heparin (porcine) injection 3,670 Units  60 Units/kg IntraVENous PRN    heparin (porcine) injection 1,840 Units  30 Units/kg IntraVENous PRN    heparin 25,000 units in dextrose 5% 250 mL (premix) infusion  5-30 Units/kg/hr IntraVENous Continuous    sodium chloride flush 0.9 % injection 5-40 mL  5-40 mL IntraVENous 2 times per day    sodium chloride flush 0.9 % injection 5-40 mL  5-40 mL IntraVENous PRN    0.9 % sodium chloride infusion   IntraVENous PRN    ondansetron (ZOFRAN-ODT) disintegrating tablet 4 mg  4 mg Oral Q8H PRN    Or    ondansetron (ZOFRAN) injection 4 mg  4 mg IntraVENous Q6H PRN    polyethylene glycol (GLYCOLAX) packet 17 g  17 g Oral Daily PRN    acetaminophen (TYLENOL) tablet 650 mg  650 mg Oral Q6H PRN    Or    acetaminophen (TYLENOL) suppository 650 mg  650 mg Rectal Q6H PRN     Signed: Le LINK-BC

## 2022-12-29 NOTE — PROGRESS NOTES
ACUTE PHYSICAL THERAPY GOALS:   (Developed with and agreed upon by patient and/or caregiver. )  LTG:  (1.)Ms. Sonia Forte will transfer from bed to chair and chair to bed with INDEPENDENT using the least restrictive device within 7 day(s). (2.)Ms. Sonia Forte will ambulate with modified INDEPENDENCE for 500+ feet with the least restrictive/no device within 7 day(s). (3.)Ms. Sonia Forte will perform standing static and dynamic balance activities x 8 minutes with SUPERVISION to improve safety within 7 day(s). PHYSICAL THERAPY Initial Assessment and Daily Note  (Link to Caseload Tracking: PT Visit Days : 1  Acknowledge Orders  Time In/Out  PT Charge Capture  Rehab Caseload Tracker    Vince Pacheco is a 79 y.o. female   PRIMARY DIAGNOSIS: Acute systolic CHF (congestive heart failure), NYHA class 4 (HCC)  Pleural effusion [J59]  Acute systolic CHF (congestive heart failure), NYHA class 4 (HCC) [I50.21]  Atrial fibrillation, unspecified type ([address] [I48.91]  New onset of congestive heart failure ([address] [I50.9]       Reason for Referral: Other abnormalities of gait and mobility (R26.89)  History of falling (Z91.81)  Inpatient: Payor: Gianni Covert / Plan: [employer] / Product Type: *No Product type* /     ASSESSMENT:     REHAB RECOMMENDATIONS:   Recommendation to date pending progress:  Setting:  Home Health Therapy    Equipment:    To Be Determined     ASSESSMENT:  Ms. Sonia Forte lives with her ex spouse, her daughter and son live next door on same property. Patient is typically independent in home and community. Today she is independent with bed mobility but did require CGA for transfers and ambulation due to unsteadiness in standing. SpO2 remained 97% on room air and HR increased with activity. Ms. Sonia Forte would benefit from skilled physical therapy while in acute care (medically necessary) to address her deficits and maximize her function.    .     [address] Mobility Inpatient Short Form  [facility] Mobility Inpatient   How much difficulty turning over in bed?: None  How much difficulty sitting down on / standing up from a chair with arms?: A Little  How much difficulty moving from lying on back to sitting on side of bed?: None  How much help from another person moving to and from a bed to a chair?: A Little  How much help from another person needed to walk in hospital room?: A Little  How much help from another person for climbing 3-5 steps with a railing?: A Little  [facility] Inpatient Mobility Raw Score : 20  AM-PAC Inpatient T-Scale Score : 47.67  Mobility Inpatient CMS 0-100% Score: 35.83  Mobility Inpatient CMS G-Code Modifier : CJ    SUBJECTIVE:   Ms. Charle Gosselin states, \"I don't eat many greens\"     Social/Functional Lives With: Spouse  Type of Home: House  Home Layout: One level  Home Access: Level entry  Receives Help From: Family  Ambulation Assistance: Independent  Transfer Assistance: Independent  Active : No  Patient's  Info: Family    OBJECTIVE:     PAIN: Sherry Leather / O2: PRECAUTION / Clifm Yisel / Jennifer Casey:   Pre Treatment:   Pain Assessment: None - Denies Pain      Post Treatment: 0 Vitals        Oxygen      IV    RESTRICTIONS/PRECAUTIONS:                    GROSS EVALUATION: Intact Impaired (Comments):   AROM [x]     PROM []    Strength [x]     Balance [] Standing - Static: Fair  Standing - Dynamic: Poor   Posture [] Forward Head  Rounded Shoulders   Sensation []     Coordination []      Tone []     Edema []    Activity Tolerance []      []      COGNITION/  PERCEPTION: Intact Impaired (Comments):   Orientation []     Vision []     Hearing [x]     Cognition  []  Decreased memory     MOBILITY: I Mod I S SBA CGA Min Mod Max Total  NT x2 Comments:   Bed Mobility    Rolling [] [x] [] [] [] [] [] [] [] [] []    Supine to Sit [] [x] [] [] [] [] [] [] [] [] []    Scooting [] [x] [] [] [] [] [] [] [] [] []    Sit to Supine [] [x] [] [] [] [] [] [] [] [] []    Transfers    Sit to Stand [] [] [] [] [x] [] [] [] [] [] []    Bed to Chair [] [] [] [] [x] [] [] [] [] [] []    Stand to Sit [] [] [] [] [x] [] [] [] [] [] []     [] [] [] [] [] [] [] [] [] [] []    I=Independent, Mod I=Modified Independent, S=Supervision, SBA=Standby Assistance, CGA=Contact Guard Assistance,   Min=Minimal Assistance, Mod=Moderate Assistance, Max=Maximal Assistance, Total=Total Assistance, NT=Not Tested    GAIT: I Mod I S SBA CGA Min Mod Max Total  NT x2 Comments:   Level of Assistance [] [] [] [] [x] [x] [] [] [] [] []    Distance 100 feet    DME Gait Belt    Gait Quality Decreased felix , Decreased step length, Path deviations , and Trunk flexion    Weightbearing Status      Stairs      I=Independent, Mod I=Modified Independent, S=Supervision, SBA=Standby Assistance, CGA=Contact Guard Assistance,   Min=Minimal Assistance, Mod=Moderate Assistance, Max=Maximal Assistance, Total=Total Assistance, NT=Not Tested    PLAN:   FREQUENCY AND DURATION: 3 times/week for duration of hospital stay or until stated goals are met, whichever comes first.    THERAPY PROGNOSIS: Good    PROBLEM LIST:   (Skilled intervention is medically necessary to address:)  Decreased Activity Tolerance  Decreased Balance  Decreased Cognition  Decreased Gait Ability  Decreased Safety Awareness  Decreased Transfer Abilities INTERVENTIONS PLANNED:   (Benefits and precautions of physical therapy have been discussed with the patient.)  Self Care Training  Therapeutic Activity  Therapeutic Exercise/HEP  Gait Training  Education       TREATMENT:   EVALUATION: LOW COMPLEXITY: (Untimed Charge)    TREATMENT:   Therapeutic Activity (23 Minutes): Therapeutic activity included Supine to Sit, Sit to Supine, Transfer Training, Ambulation on level ground, Sitting balance , and Standing balance to improve functional Activity tolerance, Balance, Mobility, and Strength. Worked on transfers from bed, recliner, and commode.   Also worked on standing balance activities in restroom.     TREATMENT GRID:  N/A    AFTER TREATMENT PRECAUTIONS: Alarm Activated, Bed, Call light within reach, Needs within reach, RN notified, and Visitors at bedside    INTERDISCIPLINARY COLLABORATION:  RN/ PCT, PT/ PTA, and RN Case Manager/      EDUCATION: Education Given To: Patient  Education Provided: Role of Therapy;Plan of Care  Education Outcome: Continued education needed    TIME IN/OUT:  Time In: 0940  Time Out: [facility]  Minutes: [address], PT

## 2022-12-29 NOTE — PROGRESS NOTES
END OF SHIFT NOTE:    INTAKE/OUTPUT  12/28 0701 - 12/29 0700  In: -   Out: 550 [Urine:550]  Voiding: Yes  Catheter: No  Drain:   External Urinary Catheter (Active)   Site Assessment Clean,dry & intact 12/28/22 1909   Placement Initiated 12/28/22 1909   Securement Method Other (Comment) 12/28/22 1909   Perineal Care Yes 12/28/22 1909   Suction 40 mmgHg continuous 12/28/22 1909               Flatus: Patient does have flatus present. Stool: 1 occurrences 12/28/2022. Characteristics:                Emesis: 0 occurrences. Characteristics:        VITAL SIGNS  Patient Vitals for the past 12 hrs:   Temp Pulse Resp BP SpO2   12/29/22 0728 98.6 °F (37 °C) 90 18 102/71 90 %   12/29/22 0715 -- 83 -- -- --   12/29/22 0250 98.2 °F (36.8 °C) 94 16 125/74 93 %   12/29/22 0021 97.9 °F (36.6 °C) (!) 109 18 126/76 95 %   12/29/22 0020 -- (!) 108 18 -- --   12/28/22 2328 -- 100 23 (!) 123/57 94 %   12/28/22 2318 -- 92 24 122/78 95 %   12/28/22 2304 -- 93 24 (!) 119/51 95 %   12/28/22 2244 -- 96 27 112/66 94 %   12/28/22 2234 -- 95 21 104/63 94 %   12/28/22 2212 -- (!) 101 14 120/71 95 %   12/28/22 2016 -- -- -- 135/79 97 %       Pain Assessment             Ambulating  Yes    Shift report given to oncoming nurse at the bedside.     Olinda Long RN

## 2022-12-29 NOTE — PROGRESS NOTES
END OF SHIFT NOTE:    INTAKE/OUTPUT  12/28 0701 - 12/29 0700  In: -   Out: 550 [Urine:550]  Voiding: Yes  Catheter: No  Drain:   External Urinary Catheter (Active)   Site Assessment Clean,dry & intact 12/28/22 1909   Placement Initiated 12/28/22 1909   Securement Method Other (Comment) 12/28/22 1909   Perineal Care Yes 12/28/22 1909   Suction 40 mmgHg continuous 12/28/22 1909               Flatus: Patient does have flatus present. Stool:  occurrences. Characteristics:                Emesis:  occurrences. Characteristics:        VITAL SIGNS  Patient Vitals for the past 12 hrs:   Temp Pulse Resp BP SpO2   12/29/22 1622 98.1 °F (36.7 °C) 89 18 112/76 95 %   12/29/22 1152 -- -- -- 129/83 --   12/29/22 1126 97.7 °F (36.5 °C) (!) 111 18 (!) 141/89 96 %   12/29/22 0728 98.6 °F (37 °C) 90 18 102/71 90 %   12/29/22 0715 -- 83 -- -- --       Pain Assessment             Ambulating  Yes    Shift report given to oncoming nurse at the bedside.     Daryle Cleverly, RN

## 2022-12-29 NOTE — H&P
[facility] Cardiology Initial Cardiac Evaluation      Date of  Admission: 12/28/2022  4:39 PM     Primary Care Physician: None None  Primary Cardiologist: None  Referring Physician: Dr Artemio Villasenor  Supervising Physician: Dr Adrienne Stewart    CC/Reason for evaluation: suspected new onset CHF     HPI:  Yfn Stevens is a 79 y.o. female with no prior history who presented to [facility] ED via EMS from urgent care with complaint of SOB, bilateral lower extremity edema and elevated BNP. Patient has not seen doctor for 15 years. Over last 1-2 months has noticed lower extremity swelling and SOB. This past week noticed worsening SOB and edema therefore presented to Urgent care. Patient reported to have elevated BNP therefore transferred to [facility] ED. In ED, /91 with HR 78. Labs showed WBC 15.2, H/H 15.1/47.5, Ptl 520, Na 141, K 3.7, BUN/Cr 11/1.30, pBNP 06233, hs trop 27, TSH 0.649 and procal 0.21. CXR showed large right pleural effusion. Was give IV lasix 40 in ED. Admitted to medicine team for dyspnea with suspected acute CHF. Cardiology consulted for further evaluation and care. No past medical history on file. No past surgical history on file.     No Known Allergies   Social History     Socioeconomic History    Marital status:      Spouse name: Not on file    Number of children: Not on file    Years of education: Not on file    Highest education level: Not on file   Occupational History    Not on file   Tobacco Use    Smoking status: Not on file    Smokeless tobacco: Not on file   Substance and Sexual Activity    Alcohol use: Not on file    Drug use: Not on file    Sexual activity: Not on file   Other Topics Concern    Not on file   Social History Narrative    Not on file     Social Determinants of Health     Financial Resource Strain: Not on file   Food Insecurity: Not on file   Transportation Needs: Not on file   Physical Activity: Not on file   Stress: Not on file   Social Connections: Not on file   Intimate Partner Violence: Not on file   Housing Stability: Not on file     Social History    None         No family history on file. Current Facility-Administered Medications   Medication Dose Route Frequency    heparin (porcine) injection 3,670 Units  60 Units/kg IntraVENous PRN    heparin (porcine) injection 1,840 Units  30 Units/kg IntraVENous PRN    heparin 25,000 units in dextrose 5% 250 mL (premix) infusion  5-30 Units/kg/hr IntraVENous Continuous    furosemide (LASIX) injection 40 mg  40 mg IntraVENous Daily    sodium chloride flush 0.9 % injection 5-40 mL  5-40 mL IntraVENous 2 times per day    sodium chloride flush 0.9 % injection 5-40 mL  5-40 mL IntraVENous PRN    0.9 % sodium chloride infusion   IntraVENous PRN    ondansetron (ZOFRAN-ODT) disintegrating tablet 4 mg  4 mg Oral Q8H PRN    Or    ondansetron (ZOFRAN) injection 4 mg  4 mg IntraVENous Q6H PRN    polyethylene glycol (GLYCOLAX) packet 17 g  17 g Oral Daily PRN    acetaminophen (TYLENOL) tablet 650 mg  650 mg Oral Q6H PRN    Or    acetaminophen (TYLENOL) suppository 650 mg  650 mg Rectal Q6H PRN       Review of Symptoms:    General: No weight changes,  weakness, fever or chills  Skin: no rashes, lumps, or other skin changes  HEENT: no headache, dizziness, lightheadedness, vision changes, hearing changes, tinnitus, vertigo, sinus pressure/pain, bleeding gums, sore throat, or hoarseness  Neck: no swollen glands, goiter, pain or stiffness  Respiratory: Positive for dyspnea, cough.  No sputum, hemoptysis, wheezing  Cardiovascular: + as per HPI  Gastrointestinal: no GERD, constipation, diarrhea, liver problems, or h/o GI bleed  Urinary: no frequency, urgency , hematuria, burning/pain with urination, recent flank pain, polyuria, nocturia, or difficulty urinating  Peripheral Vascular: no claudication, leg cramps, prior DVTs, swelling of calves, legs, or feet, color change, or swelling with redness or tenderness  Musculoskeletal: Positive for lower extremity edema. No muscle or joint pain/stiffness, joint swelling, erythema of joints, or back pain  Psychiatric: no depression or excessive stress  Neurological: no sensory or motor loss, seizures, syncope, tremors, numbness, no dementia  Hematologic: no anemia, easy bruising or bleeding  Endocrine: No thyroid problems, heat or cold intolerance, excessive sweating, polyuria, polydipsia,  diabetes. Subjective:     Physical Exam:    Vitals:    12/29/22 0021 12/29/22 0250 12/29/22 0715 12/29/22 0728   BP: 126/76 125/74  102/71   Pulse: (!) 109 94 83 90   Resp: 18 16  18   Temp: 97.9 °F (36.6 °C) 98.2 °F (36.8 °C)  98.6 °F (37 °C)   TempSrc: Oral Oral  Oral   SpO2: 95% 93%  90%   Weight:       Height:         General: Well Developed, Well Nourished, No Acute Distress  HEENT: pupils equal and round, no abnormalities noted  Neck: supple, no JVD, no carotid bruits  Heart: IRR without murmurs or gallops  Lungs: Diminished on right   Abd: soft, nontender, nondistended, with good bowel sounds  Ext: warm, ++ edema, calves supple/nontender, pulses 2+ bilaterally  Skin: warm and dry  Psychiatric: Normal mood and affect  Neurologic: Alert and oriented X 3    Cardiographics    Telemetry: AFIB  ECG: atrial fibrillation, rate 94  Echocardiogram: ordered     Labs:     Recent Labs     12/29/22  0325 12/28/22  2208 12/28/22  1645   WBC 14.8* 16.2* 15.2*   HGB 14.2 15.0 15.1   HCT 44.9 47.3* 47.5*   MCV 94.5 94.8 93.9   * 606* 520*     Lab Results   Component Value Date    WBC 14.8 (H) 12/29/2022    HGB 14.2 12/29/2022    HCT 44.9 12/29/2022     (H) 12/29/2022    ALT 20 12/29/2022    AST 26 12/29/2022     12/29/2022    K 3.8 12/29/2022     12/29/2022    CREATININE 1.30 (H) 12/29/2022    BUN 11 12/29/2022    CO2 24 12/29/2022    INR 1.4 12/28/2022      Pt has been seen and examined by Dr. Adrienne Stewart. He agrees with the following assessment and plan.      Assessment/Plan:       Principal Problem:    Acute CHF, unspecified   - appears new onset   - pBNP 87809  - CXR- large right pleural effusion  - on IV lasix 40 mg BID   - strict I&O's  - daily weights   - ECHO ordered   - monitor renal function while on diuresis     Active Problems:     Atrial fibrillation (HCC)  - appears new onset; no prior documentation   - on heparin gtt; will need to transition to [facility] prior to discharge   - rate controlled       Pleural effusion  - on IV lasix   - may need to consider thoracentesis if it doesn't improve       ANGY   - per primary team       Thank you for requesting cardiac evaluation and allowing us to participate in the care of this patient. We will continue to follow along with you.       Cy Vega PA-C  Supervising Physician: Dr Haresh Colvin

## 2022-12-29 NOTE — H&P
Hospitalist History and Physical   Admit Date:  2022  4:39 PM   Name:  Licha Saleem   Age:  79 y.o. Sex:  female  :  1940   MRN:  [id number]   Room:  ER30/30    Presenting Complaint: Leg Swelling     Reason(s) for Admission: Acute systolic CHF (congestive heart failure), NYHA class 4 (HCC) [I50.21]     History of Present Illness:   Licha Saleem is a 79 y.o. female with contributable pmhs who presented with bilateral LE edema x 3 months getting progressively worse. Also with dyspnea. Denies cough, fever, chills,   She has not seen a PCP in >15 years  Takes no medications  Lives with family who admits she has memory loss but is independent with ADLS    NO documentation in EMR from prior    ER workup     BMP - BUN 11, Cr 1.3  BNP 20,584  CBC - WBC 15k  UA bland  CXR with large right pelural effusion  ECG - afib rate controlled    Review of Systems:  10 systems reviewed and negative except as noted in HPI.   Assessment & Plan:     # Dyspnea  - suspected from acute CHF  - TTE  - Cardiology  - IV lasix  - daily weight, I/O's  - sodium restriction  - may need thoracentesis of right lung pending diuresis    # Leukocytosis  - check procal  - UA/CXR not suggestive of infection  - monitor    # right pleural effusion  - suspected r/t CHF  - IV lasix  - ?need thora prior to discharge     # new onset afib  - start heparin gtt tonight in case pt needs right thoracenteisis  - currently rate controlled  - further GDMT based on TTE appearing  - check TSH  - remote tele    # memory loss  - lives with family, no longer drives, independent with ADLs        Anticipated discharge needs:     pending    Diet: No diet orders on file  VTE ppx: hep gtt  Code status: No Order    Hospital Problems:  Principal Problem:    Acute systolic CHF (congestive heart failure), NYHA class 4 (HCC)  Active Problems:    New onset of congestive heart failure (HCC)    Atrial fibrillation ([facility] [address]    Pleural effusion  Resolved Problems: * No resolved hospital problems. *       Past History:   Denies significant PMH or Psx    No tobacco or alcohol use  Social History     Tobacco Use    Smoking status: Not on file    Smokeless tobacco: Not on file   Substance Use Topics    Alcohol use: Not on file      Social History     Substance and Sexual Activity   Drug Use Not on file     No contributable family history of heart disease      There is no immunization history on file for this patient. No Known Allergies  Prior to Admit Medications:  No current outpatient medications      Objective:   Patient Vitals for the past 24 hrs:   Temp Pulse Resp BP SpO2   12/28/22 1928 -- 92 25 (!) 134/91 95 %   12/28/22 1901 -- 94 22 108/88 95 %   12/28/22 1843 -- 86 19 135/82 95 %   12/28/22 1833 -- 95 25 (!) 155/74 96 %   12/28/22 1554 98.2 °F (36.8 °C) 78 18 (!) 129/91 97 %       Oxygen Therapy  SpO2: 95 %  Pulse Oximeter Device Mode: Continuous  O2 Device: None (Room air)    Estimated body mass index is 24.69 kg/m² as calculated from the following:    Height as of this encounter: 5' 2\" (1.575 m). Weight as of this encounter: 135 lb (61.2 kg). No intake or output data in the 24 hours ending 12/28/22 2135      Physical Exam:    Blood pressure (!) 134/91, pulse 92, temperature 98.2 °F (36.8 °C), temperature source Oral, resp. rate 25, height 5' 2\" (1.575 m), weight 135 lb (61.2 kg), SpO2 95 %. General:    Well nourished. Head:  Normocephalic, atraumatic  Eyes:  Sclerae appear normal.  Pupils equally round. ENT:  Nares appear normal, no drainage. Moist oral mucosa  Neck:  No restricted ROM. Trachea midline   CV:   RRR. No m/r/g. No jugular venous distension. Lungs:   Diminished at bilateral bases  No wheezing, rhonchi, or rales. Symmetric expansion. Abdomen: Bowel sounds present. Soft, nontender, nondistended. Extremities: +2 pitting edema to above knees bilaterally  Skin:     No rashes and normal coloration. Warm and dry.     Neuro:  CN II-XII grossly intact. Sensation intact. A&Ox3  Psych:  Normal mood and affect.       I have personally reviewed labs and tests showing:  Recent Labs:  Recent Results (from the past 24 hour(s))   EKG 12 Lead    Collection Time: 12/28/22  4:42 PM   Result Value Ref Range    Ventricular Rate 94 BPM    QRS Duration 96 ms    Q-T Interval 382 ms    QTc Calculation (Bazett) 477 ms    R Axis 90 degrees    T Axis -5 degrees    Diagnosis Atrial fibrillation    CBC with Auto Differential    Collection Time: 12/28/22  4:45 PM   Result Value Ref Range    WBC 15.2 (H) 4.3 - 11.1 K/uL    RBC 5.06 4.05 - 5.2 M/uL    Hemoglobin 15.1 11.7 - 15.4 g/dL    Hematocrit 47.5 (H) 35.8 - 46.3 %    MCV 93.9 82 - 102 FL    MCH 29.8 26.1 - 32.9 PG    MCHC 31.8 31.4 - 35.0 g/dL    RDW 16.2 (H) 11.9 - 14.6 %    Platelets 936 (H) 804 - 450 K/uL    MPV 11.5 9.4 - 12.3 FL    nRBC 0.00 0.0 - 0.2 K/uL    Seg Neutrophils 77 43 - 78 %    Lymphocytes 10 (L) 13 - 44 %    Monocytes 10 4.0 - 12.0 %    Eosinophils % 1 0.5 - 7.8 %    Basophils 1 0.0 - 2.0 %    Immature Granulocytes 1 0.0 - 5.0 %    Segs Absolute 11.6 (H) 1.7 - 8.2 K/UL    Absolute Lymph # 1.5 0.5 - 4.6 K/UL    Absolute Mono # 1.5 (H) 0.1 - 1.3 K/UL    Absolute Eos # 0.2 0.0 - 0.8 K/UL    Basophils Absolute 0.2 0.0 - 0.2 K/UL    Absolute Immature Granulocyte 0.2 0.0 - 0.5 K/UL    RBC Comment SLIGHT  HYPOCHROMIA        WBC Comment Result Confirmed By Smear      Platelet Comment ADEQUATE      Differential Type AUTOMATED     Comprehensive Metabolic Panel    Collection Time: 12/28/22  4:45 PM   Result Value Ref Range    Sodium 141 133 - 143 mmol/L    Potassium 3.7 3.5 - 5.1 mmol/L    Chloride 108 101 - 110 mmol/L    CO2 25 21 - 32 mmol/L    Anion Gap 8 2 - 11 mmol/L    Glucose 102 (H) 65 - 100 mg/dL    BUN 11 8 - 23 MG/DL    Creatinine 1.30 (H) 0.6 - 1.0 MG/DL    Est, Glom Filt Rate 41 (L) >60 ml/min/1.73m2    Calcium 9.2 8.3 - 10.4 MG/DL    Total Bilirubin 2.0 (H) 0.2 - 1.1 MG/DL    ALT 22 12 - 65 U/L AST 32 15 - 37 U/L    Alk Phosphatase 90 50 - 136 U/L    Total Protein 6.8 6.3 - 8.2 g/dL    Albumin 3.5 3.2 - 4.6 g/dL    Globulin 3.3 2.8 - 4.5 g/dL    Albumin/Globulin Ratio 1.1 0.4 - 1.6     Troponin    Collection Time: 12/28/22  4:45 PM   Result Value Ref Range    Troponin, High Sensitivity 27.0 (H) 0 - 14 pg/mL   Magnesium    Collection Time: 12/28/22  4:45 PM   Result Value Ref Range    Magnesium 2.1 1.8 - 2.4 mg/dL   Brain Natriuretic Peptide    Collection Time: 12/28/22  4:45 PM   Result Value Ref Range    NT Pro-BNP 20,584 (H) <450 PG/ML   Urinalysis w rflx microscopic    Collection Time: 12/28/22  7:57 PM   Result Value Ref Range    Color, UA YELLOW/STRAW      Appearance CLEAR      Specific Gravity, UA 1.008 1.001 - 1.023      pH, Urine 5.5 5.0 - 9.0      Protein, UA Negative NEG mg/dL    Glucose, UA Negative mg/dL    Ketones, Urine Negative NEG mg/dL    Bilirubin Urine Negative NEG      Blood, Urine Negative NEG      Urobilinogen, Urine 0.2 0.2 - 1.0 EU/dL    Nitrite, Urine Negative NEG      Leukocyte Esterase, Urine Negative NEG         I have personally reviewed imaging studies showing:  XR CHEST 1 VIEW    Result Date: 12/28/2022  Chest X-ray INDICATION: Shortness of breath and extremity swelling AP/PA view of the chest was obtained. FINDINGS: There is a large right pleural effusion with associated infiltrate/atelectasis in the right lung base. Small left effusion is also present. The heart size is normal.  The bony thorax is intact. Large right pleural effusion       Echocardiogram:  No results found for this or any previous visit. Orders Placed This Encounter   Medications    furosemide (LASIX) injection 40 mg         Signed:  Duy Vance DO    Part of this note may have been written by using a voice dictation software. The note has been proof read but may still contain some grammatical/other typographical errors.

## 2022-12-29 NOTE — CONSULTS
History and Physical Initial Visit NOTE           12/29/2022    Ambrosio Michelle                        Date of Admission:  12/28/2022    The patient's chart is reviewed and the patient is discussed with the staff. Subjective:     Patient is a 79 y.o.  female seen and evaluated at the request of hospitalist for pleural effusion. Patient arrived to ED on 12/28 with a 2 month history of worsening bilateral leg swelling, orbital swelling and worsening of SOB. She tried to see her PCP but could not see her quickly therefore she went to [facility] who then sent her to ED. BNP at [facility] was >1900. Patient states she has not been to a doctor in 15 years. She was admitted under hospitalist service, started on IV lasix, and consulted cardiology. She has no cardiac or lung history. Other significant labs on arrival include Cr 1.3, proBNP 20,584, trop 27.0, WBC 15.2, plat 520, UA negative. She denies any cardiac history. States the only significant medical history she has is she had pleurisy when she was 8years old and spend time in a sanatorium. She lives with family and no longer drives. Granddaughter states she has been in excellent health but started noticing swelling about 2 months ago that has slowly gotten worse. Patient states she is slightly short of breath with activity but not at rest. Reports a dry cough with deep breathing. On RA. Review of Systems: Comprehensive ROS negative except in HPI    No current outpatient medications   No past medical history on file. No past surgical history on file.   Social History     Socioeconomic History    Marital status:      Spouse name: Not on file    Number of children: Not on file    Years of education: Not on file    Highest education level: Not on file   Occupational History    Not on file   Tobacco Use    Smoking status: Not on file    Smokeless tobacco: Not on file   Substance and Sexual Activity    Alcohol use: Not on file Drug use: Not on file    Sexual activity: Not on file   Other Topics Concern    Not on file   Social History Narrative    Not on file     Social Determinants of Health     Financial Resource Strain: Not on file   Food Insecurity: Not on file   Transportation Needs: Not on file   Physical Activity: Not on file   Stress: Not on file   Social Connections: Not on file   Intimate Partner Violence: Not on file   Housing Stability: Not on file     No family history on file. No Known Allergies  Objective:   Blood pressure 129/83, pulse (!) 111, temperature 97.7 °F (36.5 °C), temperature source Oral, resp. rate 18, height 5' 2\" (1.575 m), weight 135 lb (61.2 kg), SpO2 96 %. Intake/Output Summary (Last 24 hours) at 12/29/2022 1347  Last data filed at 12/29/2022 1127  Gross per 24 hour   Intake 350 ml   Output 1320 ml   Net -970 ml     PHYSICAL EXAM   Constitutional:  the patient is well developed, elderly, and in no acute distress  EENMT:  Sclera clear, pupils equal, oral mucosa moist  Respiratory: symmetric chest rise. Diminished in right base; clear in left; on RA   Cardiovascular:  IRR without M,G,R. There is +3 pitting lower extremity edema. Gastrointestinal: soft and non-tender; with positive bowel sounds. Musculoskeletal: warm without cyanosis. Normal muscle tone. Skin:  no jaundice or rashes, no wounds   Neurologic: symmetric strength, fluent speech  Psychiatric:  calm, appropriate, oriented x 4    Imaging: I performed an independent interpretation of the patient's images.   CXR:  12/28      CT Chest: none    Recent Labs     12/28/22  1645 12/28/22  2208 12/29/22  0325   WBC 15.2* 16.2* 14.8*   HGB 15.1 15.0 14.2   HCT 47.5* 47.3* 44.9   * 606* 586*   INR  --  1.4  --      --  142   K 3.7  --  3.8     --  110   CO2 25  --  24   BUN 11  --  11   CREATININE 1.30*  --  1.30*   MG 2.1  --   --    BILITOT 2.0*  --  1.8*   AST 32  --  26   ALT 22  --  20   ALKPHOS 90  --  88   TROPHS 27.0*  --   -- NTPROBNP 34,477*  --   --        Lab Results   Component Value Date/Time     12/29/2022 03:25 AM    K 3.8 12/29/2022 03:25 AM     12/29/2022 03:25 AM    CO2 24 12/29/2022 03:25 AM    BUN 11 12/29/2022 03:25 AM    CREATININE 1.30 12/29/2022 03:25 AM    GLUCOSE 95 12/29/2022 03:25 AM    CALCIUM 9.1 12/29/2022 03:25 AM      No results found for: BNP    ECHO: No results found for this or any previous visit. MICRO: No results for input(s): CULTURE in the last 72 hours. Assessment and Plan:  (Medical Decision Making)       Acute systolic CHF (congestive heart failure), NYHA class 4 ([facility])    New onset of congestive heart failure ([facility] [address]  Plan: ECHO ordered and in process; no known history  Getting diuresed with IVP lasix 40 mg   Cardiology following;   Edema much improved per granddaughter. Seems to have about 770ml out in 24 hours; needs strict I&Os. Atrial fibrillation ([facility] [address]  Plan: noted on EKG on arrival; no prior history   On heparin gtt; seems rate controlled in low 100's. On PO metoprolol 25 mg  Cardiology following      Pleural effusion  Plan: large on right; will hold heparin in am and schedule thoracentesis in the am.       Full Code    Thank you very much for this referral.  We appreciate the opportunity to participate in this patient's care. Will follow along with above stated plan. In this split/shared evaluation I performed performed a medically appropriate history and exam, counseled and educated the patient and/or family member, documented information in EMR, and coordinated care. which accounted for 12 clinical time. TATYANA Duckworth - NP      In this split/shared evaluation I performed reviewed the patients's H&P, available images, labs, cultures. , discussed case in detail with NPP, performed a medically appropriate history and exam, counseled and educated the patient and/or family member, ordered and/or reviewed medications, tests or procedures, documented information in EMR, independently interpreted images, and coordinated care. which accounted for 15 minutes clinical time. Impression:   Pt with new diagnosis of likely CHF. TTE pending. Evidence of volume overload. Elevated JVD. Large R sided pleural effusion. Never had thoracentesis before. On heparin drip for a fib. On RA so not urgent for today. Will place case request and check with bronch lab on what time thora can be performed tomorrow. Will hold heparin starting at 8am unless directed otherwise by [facility] lab. In the meantime agree with ongoing diuresis. Will need to monitor strict I/o and follow up TTE results.      Ina Bumpers, MD

## 2022-12-30 ENCOUNTER — HOME HEALTH ADMISSION (OUTPATIENT)
Dept: HOME HEALTH SERVICES | Facility: HOME HEALTH | Age: 82
End: 2022-12-30
Payer: MEDICARE

## 2022-12-30 PROBLEM — I50.9 NEW ONSET OF CONGESTIVE HEART FAILURE (HCC): Status: RESOLVED | Noted: 2022-12-28 | Resolved: 2022-12-30

## 2022-12-30 LAB
ALBUMIN SERPL-MCNC: 3.4 G/DL (ref 3.2–4.6)
ALBUMIN/GLOB SERPL: 1 {RATIO} (ref 0.4–1.6)
ALP SERPL-CCNC: 81 U/L (ref 50–136)
ALT SERPL-CCNC: 24 U/L (ref 12–65)
ANION GAP SERPL CALC-SCNC: 5 MMOL/L (ref 2–11)
APPEARANCE FLD: CLEAR
AST SERPL-CCNC: 29 U/L (ref 15–37)
BILIRUB SERPL-MCNC: 1.8 MG/DL (ref 0.2–1.1)
BUN SERPL-MCNC: 10 MG/DL (ref 8–23)
CALCIUM SERPL-MCNC: 9.2 MG/DL (ref 8.3–10.4)
CHLORIDE SERPL-SCNC: 104 MMOL/L (ref 101–110)
CO2 SERPL-SCNC: 30 MMOL/L (ref 21–32)
COLOR FLD: NORMAL
CREAT SERPL-MCNC: 1.4 MG/DL (ref 0.6–1)
ERYTHROCYTE [DISTWIDTH] IN BLOOD BY AUTOMATED COUNT: 17.1 % (ref 11.9–14.6)
GLOBULIN SER CALC-MCNC: 3.4 G/DL (ref 2.8–4.5)
GLUCOSE FLD-MCNC: 111 MG/DL
GLUCOSE SERPL-MCNC: 101 MG/DL (ref 65–100)
HCT VFR BLD AUTO: 48.5 % (ref 35.8–46.3)
HGB BLD-MCNC: 15.4 G/DL (ref 11.7–15.4)
LDH FLD L TO P-CCNC: 100 U/L
MCH RBC QN AUTO: 30.1 PG (ref 26.1–32.9)
MCHC RBC AUTO-ENTMCNC: 31.8 G/DL (ref 31.4–35)
MCV RBC AUTO: 94.9 FL (ref 82–102)
NRBC # BLD: 0 K/UL (ref 0–0.2)
NUC CELL # FLD: 67 /CU MM
PLATELET # BLD AUTO: 620 K/UL (ref 150–450)
PMV BLD AUTO: 10.9 FL (ref 9.4–12.3)
POTASSIUM SERPL-SCNC: 3.9 MMOL/L (ref 3.5–5.1)
PROT FLD-MCNC: 1.9 G/DL
PROT SERPL-MCNC: 6.8 G/DL (ref 6.3–8.2)
RBC # BLD AUTO: 5.11 M/UL (ref 4.05–5.2)
RBC # FLD: <1000 /CU MM
SODIUM SERPL-SCNC: 139 MMOL/L (ref 133–143)
SPECIMEN SOURCE FLD: NORMAL
WBC # BLD AUTO: 13.5 K/UL (ref 4.3–11.1)

## 2022-12-30 PROCEDURE — C1729 CATH, DRAINAGE: HCPCS | Performed by: INTERNAL MEDICINE

## 2022-12-30 PROCEDURE — 3609027000 HC THORACENTESIS W/ULTRASOUND: Performed by: INTERNAL MEDICINE

## 2022-12-30 PROCEDURE — 83615 LACTATE (LD) (LDH) ENZYME: CPT

## 2022-12-30 PROCEDURE — 1100000000 HC RM PRIVATE

## 2022-12-30 PROCEDURE — 6360000002 HC RX W HCPCS: Performed by: INTERNAL MEDICINE

## 2022-12-30 PROCEDURE — 87102 FUNGUS ISOLATION CULTURE: CPT

## 2022-12-30 PROCEDURE — 89050 BODY FLUID CELL COUNT: CPT

## 2022-12-30 PROCEDURE — 36415 COLL VENOUS BLD VENIPUNCTURE: CPT

## 2022-12-30 PROCEDURE — 85027 COMPLETE CBC AUTOMATED: CPT

## 2022-12-30 PROCEDURE — 6370000000 HC RX 637 (ALT 250 FOR IP): Performed by: INTERNAL MEDICINE

## 2022-12-30 PROCEDURE — 87205 SMEAR GRAM STAIN: CPT

## 2022-12-30 PROCEDURE — 80053 COMPREHEN METABOLIC PANEL: CPT

## 2022-12-30 PROCEDURE — 2580000003 HC RX 258: Performed by: INTERNAL MEDICINE

## 2022-12-30 PROCEDURE — 82945 GLUCOSE OTHER FLUID: CPT

## 2022-12-30 PROCEDURE — 87116 MYCOBACTERIA CULTURE: CPT

## 2022-12-30 PROCEDURE — 84157 ASSAY OF PROTEIN OTHER: CPT

## 2022-12-30 PROCEDURE — 97530 THERAPEUTIC ACTIVITIES: CPT

## 2022-12-30 PROCEDURE — 0W993ZZ DRAINAGE OF RIGHT PLEURAL CAVITY, PERCUTANEOUS APPROACH: ICD-10-PCS | Performed by: INTERNAL MEDICINE

## 2022-12-30 PROCEDURE — 88112 CYTOPATH CELL ENHANCE TECH: CPT

## 2022-12-30 PROCEDURE — 2709999900 HC NON-CHARGEABLE SUPPLY: Performed by: INTERNAL MEDICINE

## 2022-12-30 RX ORDER — DIGOXIN 125 MCG
125 TABLET ORAL DAILY
Status: DISCONTINUED | OUTPATIENT
Start: 2022-12-30 | End: 2023-01-01 | Stop reason: HOSPADM

## 2022-12-30 RX ORDER — FUROSEMIDE 10 MG/ML
40 INJECTION INTRAMUSCULAR; INTRAVENOUS DAILY
Status: DISCONTINUED | OUTPATIENT
Start: 2022-12-31 | End: 2022-12-31

## 2022-12-30 RX ORDER — DIGOXIN 0.25 MG/ML
125 INJECTION INTRAMUSCULAR; INTRAVENOUS DAILY
Status: DISCONTINUED | OUTPATIENT
Start: 2022-12-30 | End: 2022-12-30

## 2022-12-30 RX ORDER — METOPROLOL SUCCINATE 25 MG/1
25 TABLET, EXTENDED RELEASE ORAL 2 TIMES DAILY
Status: DISCONTINUED | OUTPATIENT
Start: 2022-12-30 | End: 2023-01-01 | Stop reason: HOSPADM

## 2022-12-30 RX ADMIN — DIGOXIN 125 MCG: 125 TABLET ORAL at 13:14

## 2022-12-30 RX ADMIN — FUROSEMIDE 40 MG: 10 INJECTION, SOLUTION INTRAMUSCULAR; INTRAVENOUS at 08:50

## 2022-12-30 RX ADMIN — METOPROLOL SUCCINATE 25 MG: 25 TABLET, EXTENDED RELEASE ORAL at 22:00

## 2022-12-30 RX ADMIN — SODIUM CHLORIDE, PRESERVATIVE FREE 10 ML: 5 INJECTION INTRAVENOUS at 22:00

## 2022-12-30 RX ADMIN — SODIUM CHLORIDE, PRESERVATIVE FREE 10 ML: 5 INJECTION INTRAVENOUS at 11:49

## 2022-12-30 RX ADMIN — METOPROLOL SUCCINATE 25 MG: 25 TABLET, EXTENDED RELEASE ORAL at 08:50

## 2022-12-30 ASSESSMENT — PAIN - FUNCTIONAL ASSESSMENT
PAIN_FUNCTIONAL_ASSESSMENT: NONE - DENIES PAIN

## 2022-12-30 NOTE — PROGRESS NOTES
Pt sat up on side of bed for thoracentesis. Consent obtained. Time out performed. Pts vitals monitored throughout procedure. Bilateral ultrasound done and pic taken of pleural fluid. ~1500 ml yellow pleural fluid from R.  Pt tolerated procedure well with no adverse rxn. Specimens sent to the lab x 3 and labeled appropriately. Site dressed appropriately and report given to pts RN.    Lung sliding done and ultrasound findings reviewed by MD.

## 2022-12-30 NOTE — PROGRESS NOTES
Heparin drip stopped by this writer @ 0200 per order. Drip was running @ 20units/kg/hr. Go to [facility] for more details. Last lab value for Anti-XA unfract @ 2300 was 0.31, resulting in a therapeutic range for pt.

## 2022-12-30 NOTE — PROGRESS NOTES
END OF SHIFT NOTE:    INTAKE/OUTPUT  12/29 0701 - 12/30 0700  In: 950 [P.O.:950]  Out: 0548 [Urine:1770]  Voiding: Yes  Catheter: No  Drain:   External Urinary Catheter (Active)   Site Assessment Clean,dry & intact 12/28/22 1909   Placement Initiated 12/28/22 1909   Securement Method Other (Comment) 12/28/22 1909   Perineal Care Yes 12/28/22 1909   Suction 40 mmgHg continuous 12/28/22 1909               Flatus: Patient does have flatus present. Stool: 0 occurrences. Characteristics:                Emesis: 0 occurrences. Characteristics:        VITAL SIGNS  Patient Vitals for the past 12 hrs:   Temp Pulse Resp BP SpO2   12/30/22 0340 98.3 °F (36.8 °C) 94 16 106/64 90 %   12/30/22 0130 -- 71 16 -- --   12/29/22 2332 98.2 °F (36.8 °C) 83 16 125/60 92 %   12/29/22 1952 98.6 °F (37 °C) 79 14 114/68 96 %   12/29/22 1914 -- 78 16 -- --       Pain Assessment             Ambulating  Yes    Shift report given to oncoming nurse at the bedside.     Amparo Jade RN

## 2022-12-30 NOTE — PROGRESS NOTES
END OF SHIFT NOTE:    INTAKE/OUTPUT  12/29 0701 - 12/30 0700  In: 950 [P.O.:950]  Out: 9454 [Urine:1770]  Voiding: Yes  Catheter: No  Drain:   External Urinary Catheter (Active)   Site Assessment Clean,dry & intact 12/28/22 1909   Placement Initiated 12/28/22 1909   Securement Method Other (Comment) 12/28/22 1909   Perineal Care Yes 12/28/22 1909   Suction 40 mmgHg continuous 12/28/22 1909               Flatus: Patient does have flatus present. Stool: 0 occurrences. Characteristics:                Emesis:  occurrences. Characteristics:        VITAL SIGNS  Patient Vitals for the past 12 hrs:   Temp Pulse Resp BP SpO2   12/30/22 1526 98.2 °F (36.8 °C) 89 18 122/70 93 %   12/30/22 1119 98.1 °F (36.7 °C) (!) 111 18 118/68 97 %   12/30/22 0955 -- (!) 113 18 131/89 95 %   12/30/22 0950 -- (!) 110 18 128/83 97 %   12/30/22 0942 -- (!) 111 20 120/72 98 %   12/30/22 0941 -- (!) 104 18 (!) 113/57 98 %   12/30/22 0925 -- (!) 109 18 120/70 97 %   12/30/22 0739 98 °F (36.7 °C) (!) 112 20 120/81 95 %       Pain Assessment             Ambulating  Yes    Shift report given to oncoming nurse at the bedside. Eleonora Cote

## 2022-12-30 NOTE — PROGRESS NOTES
Samantha Isaac  Admission Date: 12/28/2022         Daily Progress Note: 12/30/2022    The patient's chart is reviewed and the patient is discussed with the staff. Background: Patient is a 79 y.o.  female seen and evaluated at the request of hospitalist for pleural effusion. Patient arrived to ED on 12/28 with a 2 month history of worsening bilateral leg swelling, orbital swelling and worsening of SOB. She tried to see her PCP but could not see her quickly therefore she went to [facility] who then sent her to ED. BNP at [facility] was >1900. Patient states she has not been to a doctor in 15 years. She was admitted under hospitalist service, started on IV lasix, and consulted cardiology. She has no cardiac or lung history. Other significant labs on arrival include Cr 1.3, proBNP 20,584, trop 27.0, WBC 15.2, plat 520, UA negative. She denies any cardiac history. States the only significant medical history she has is she had pleurisy when she was 8years old and spend time in a sanatorium. She lives with family and no longer drives. Granddaughter states she has been in excellent health but started noticing swelling about 2 months ago that has slowly gotten worse. Patient states she is slightly short of breath with activity but not at rest. Reports a dry cough with deep breathing. On RA. Subjective:     Pt had TTE yesterday and EF 15%. Seen prior to thoracentesis. States her breathing is about the same. Still on room air. No new issues. Net negative 800cc in last 24 hours.      Current Facility-Administered Medications   Medication Dose Route Frequency    furosemide (LASIX) injection 40 mg  40 mg IntraVENous BID    metoprolol succinate (TOPROL XL) extended release tablet 25 mg  25 mg Oral Daily    heparin (porcine) injection 3,670 Units  60 Units/kg IntraVENous PRN    heparin (porcine) injection 1,840 Units  30 Units/kg IntraVENous PRN    heparin 25,000 units in dextrose 5% 250 mL (premix) infusion  5-30 Units/kg/hr IntraVENous Continuous    sodium chloride flush 0.9 % injection 5-40 mL  5-40 mL IntraVENous 2 times per day    sodium chloride flush 0.9 % injection 5-40 mL  5-40 mL IntraVENous PRN    0.9 % sodium chloride infusion   IntraVENous PRN    ondansetron (ZOFRAN-ODT) disintegrating tablet 4 mg  4 mg Oral Q8H PRN    Or    ondansetron (ZOFRAN) injection 4 mg  4 mg IntraVENous Q6H PRN    polyethylene glycol (GLYCOLAX) packet 17 g  17 g Oral Daily PRN    acetaminophen (TYLENOL) tablet 650 mg  650 mg Oral Q6H PRN    Or    acetaminophen (TYLENOL) suppository 650 mg  650 mg Rectal Q6H PRN     Review of Systems: Comprehensive ROS negative except in HPI  Objective:   Blood pressure 120/70, pulse (!) 109, temperature 98 °F (36.7 °C), temperature source Oral, resp. rate 18, height 5' 2\" (1.575 m), weight 132 lb 3.2 oz (60 kg), SpO2 97 %. Intake/Output Summary (Last 24 hours) at 12/30/2022 0932  Last data filed at 12/30/2022 0340  Gross per 24 hour   Intake 600 ml   Output 1650 ml   Net -1050 ml     Physical Exam:   Constitutional:  the patient is well developed and in no acute distress  EENMT:  Sclera clear, pupils equal, oral mucosa moist  Respiratory: symmetric chest rise. Decreased on right  Cardiovascular:  RRR without M,G,R. There is 2+ B lower extremity edema. Gastrointestinal: soft and non-tender; with positive bowel sounds. Musculoskeletal: warm without cyanosis. Normal muscle tone. Skin:  no jaundice or rashes, no wounds   Neurologic: symmetric strength, fluent speech  Psychiatric:  calm, appropriate, oriented x 4    Imaging: I performed an independent interpretation of the patient's images.   CXR:   12/28/22       LAB:  Recent Labs     12/28/22  2208 12/29/22  0325 12/30/22  0838   WBC 16.2* 14.8* 13.5*   HGB 15.0 14.2 15.4   HCT 47.3* 44.9 48.5*   * 586* 620*   INR 1.4  --   --    PROCAL 0.21  --   --      Recent Labs     12/28/22  1645 12/29/22  0325 12/30/22  0838   NA 141 142 139   K 3.7 3.8 3.9    110 104   CO2 25 24 30   BUN 11 11 10   CREATININE 1.30* 1.30* 1.40*   MG 2.1  --   --    BILITOT 2.0* 1.8* 1.8*   AST 32 26 29   ALT 22 20 24   ALKPHOS 90 88 81     Recent Labs     12/28/22  1645   TROPHS 27.0*   NTPROBNP 20,584*     Recent Labs     12/28/22  1645 12/29/22  0325 12/30/22  0838   GLUCOSE 102* 95 101*      Microbiology:   No results for input(s): CULTURE in the last 72 hours. ECHO: 12/28/22    TRANSTHORACIC ECHOCARDIOGRAM (TTE) COMPLETE (CONTRAST/BUBBLE/3D PRN) 12/29/2022  4:05 PM (Final)    Interpretation Summary    Left Ventricle: Severely reduced left ventricular systolic function with a visually estimated EF of 15 - 20%. Left ventricle size is normal. Normal wall thickness. Severe global hypokinesis present. Abnormal diastolic function. Right Ventricle: Reduced systolic function. Mitral Valve: Severe regurgitation. Tricuspid Valve: Severe regurgitation. The estimated RVSP is 47 mmHg. Left Atrium: Left atrium is dilated. Right Atrium: Right atrium is dilated. Technical qualifiers: Procedure performed with the patient in a supine position, color flow Doppler was performed and pulse wave and/or continuous wave Doppler was performed. Contrast used: Definity. Signed by: Radha Stahl MD on 12/29/2022  4:05 PM    Assessment and Plan:  (Medical Decision Making)   Principal Problem:    Acute systolic CHF (congestive heart failure), NYHA class 4 ([address]  Plan: EF 15-20%. Cardiology to see today. Getting IV lasix. Active Problems:    Atrial fibrillation (HCC)  Plan: per cardiology    Pleural effusion  Plan: will perform R sided thoracentesis and send PFA but expect transudate. More than 50% of the time documented was spent in face-to-face contact with the patient and in the care of the patient on the floor/unit where the patient is located.     Miah Villafana MD

## 2022-12-30 NOTE — PROGRESS NOTES
[facility] CARDIOLOGY PROGRESS NOTE           12/30/2022 8:24 AM    Admit Date: 12/28/2022      Subjective:   Patient had thoracentesis earlier today.  Dyspnea much improved.  Review of telemetry shows persistent atrial fibrillation with average heart rate 110.  Echo with severe LV dysfunction.    In: 950 [P.O.:950]  Out: 1770 [Urine:1770]     Weight change: -2 lb 12.8 oz (-1.27 kg)     Last Weight Metrics:  Weight Loss Metrics 12/30/2022 12/28/2022   Height - 5' 2\"   Weight 132 lbs 3 oz 135 lbs   BMI (Calculated) 24.2 kg/m2 24.7 kg/m2   Some recent data might be hidden          ROS:  Cardiovascular:  As noted above    Objective:      Vitals:    12/30/22 0130 12/30/22 0340 12/30/22 0649 12/30/22 0739   BP:  106/64  120/81   Pulse: 71 94  (!) 112   Resp: 16 16  20   Temp:  98.3 °F (36.8 °C)  98 °F (36.7 °C)   TempSrc:  Oral  Oral   SpO2:  90%  95%   Weight:   132 lb 3.2 oz (60 kg)    Height:           Physical Exam:  General-No Acute Distress  Neck- supple, no JVD  CV- IRIR with Grade II/VI SANTINO  Lung- clear bilaterally  Abd- soft, nontender, nondistended  Ext- 1+ edema bilaterally.  Skin- warm and dry      Data Review:   Recent Labs     12/29/22  0325 12/28/22  2208 12/28/22  1645   WBC 14.8* 16.2* 15.2*   HGB 14.2 15.0 15.1   HCT 44.9 47.3* 47.5*   MCV 94.5 94.8 93.9   * 606* 520*       Recent Labs     12/29/22  0325      K 3.8      CO2 24   BUN 11   CREATININE 1.30*   GLUCOSE 95   CALCIUM 9.1   PROT 6.2*   LABALBU 3.3   BILITOT 1.8*   ALKPHOS 88   AST 26   ALT 20   LABGLOM 41*   GLOB 2.9       No results for input(s): CKTOTAL, CKMB, CKMBINDEX, DDIMER, TROPONINI in the last 720 hours.    Echo (12/29/22):    Left Ventricle: Severely reduced left ventricular systolic function with a visually estimated EF of 15 - 20%. Left ventricle size is normal. Normal wall thickness. Severe global hypokinesis present. Abnormal diastolic function.    Right Ventricle: Reduced systolic function.     Mitral Valve: Severe regurgitation. Tricuspid Valve: Severe regurgitation. The estimated RVSP is 47 mmHg. Left Atrium: Left atrium is dilated. Right Atrium: Right atrium is dilated. Assessment/Plan:     Active Hospital Problems    Atrial fibrillation (HCC)  Increased Toprol. Attempt rate control strategy. Patient declines consideration for SKYLER and cardioversion at the present time. She will continue discussed with her daughter. Pleural effusion  Patient is status post thoracentesis. Monitor closely. *Acute systolic CHF (congestive heart failure), NYHA class 4 ([address]  I had a long discussion with patient and daughter. She has a new onset cardiomyopathy with severe LV dysfunction. Apparently she has been having CHF symptoms for the last 3 months. Discussed the possibility of a tachycardia induced cardiomyopathy. Discussed proceeding with SKYLER cardioversion and amiodarone loading as well as cardiac catheterization. Patient declines any invasive procedures. She would like to attempt a rate control anticoagulation strategy with symptomatic treatment for her heart failure. We discussed the difficulty with rate control given her low blood pressure readings. We will attempt to increase Toprol to 25 mg twice daily. Add low-dose digoxin. Decrease Lasix to once a day.                 Blessing Arellano MD

## 2022-12-30 NOTE — CARE COORDINATION
This CM met with pt and daughter at bedside this day to complete assessment. Pt verified pt's address, emergency contact, and home address. They report pt has not seen a PCP in 15 years and has not been on any prescribed medications. They are agreeable to PCP referral.  Pt lives at home with her son with additional family members nearby. There are steps to enter; pt has no home DME. They confirm that at baseline prior to admission pt is independent with most all ADLs including bathing, dressing, cooking, and ambulating. Pt does not drive but has supportive family that provides transportation as needed. We discussed discharge planning this day. Plan is for pt to return home with family at discharge. Referral made to PCP this day - they requested [place] in  and this CM  made referral to that location specifically. Discussed recommendation that pt would benefit from home  health at discharge - they are agreeable to referral.  Reviewed  agencies - referral to [facility]. Additionally, pt with dx of CHF and she has decided  on  no procedures - this CM  discussed a referral to home Palliative Care as  an additional resource to assist in management of chronic conditions. Pt and daughter are agreeable to referral - reviewed agencies and referral to [facility]. Pt and daughter inquired about hospital bed - this CM informed that will speak to primary provider to see if pt meets criteria for hospital bed. Request sent to attending provider - if appropriate CM to make referral  this day. No additional CM needs at this time. Will continue to monitor and update as needed. 12/30/22 0156   Service Assessment   Patient Orientation Alert and Oriented   Cognition Alert   History Provided By Child/Family; Patient   Primary Caregiver Self   Support Systems Children;Family Members;Meals on Wheels   PCP Verified by CM Yes   Last Visit to PCP   (Pt has not seen a PCP in 15 years)   Prior Functional Level Independent in ADLs/IADLs   Current Functional Level Other (see comment)  (TBD by clinical team)   Can patient return to prior living arrangement Yes   Ability to make needs known: Good   Family able to assist with home care needs: Yes   Discharge Planning   Type of Residence House   Living Arrangements Children   Current Services Prior To Admission None   Potential Assistance Needed Home Care;Meals On Wheels   DME Ordered? No   Potential Assistance Purchasing Medications No   Type of Home Care Services Meals on Wheels;Nursing Services   Patient expects to be discharged to: House   Follow Up Appointment: Best Day/Time  Monday AM   History of falls? 1   Services At/After Discharge   Confirm Follow Up Transport Family   Condition of Participation: Discharge Planning   The Plan for Transition of Care is related to the following treatment goals: Home with family support   The Patient and/or Patient Representative was provided with a Choice of Provider? Patient;Patient Representative   Name of the Patient Representative who was provided with the Choice of Provider and agrees with the Discharge Plan? Pt daughter, Cathlene   The Patient and/Or Patient Representative agree with the Discharge Plan? Yes   Freedom of Choice list was provided with basic dialogue that supports the patient's individualized plan of care/goals, treatment preferences, and shares the quality data associated with the providers?   Yes

## 2022-12-30 NOTE — PROGRESS NOTES
Hospitalist Progress Note   Admit Date:  2022  4:39 PM   Name:  Katie Thompson   Age:  79 y.o. Sex:  female  :  1940   MRN:  [id number]   Room:      Presenting Complaint: Leg Swelling     Reason(s) for Admission: Pleural effusion [E38]  Acute systolic CHF (congestive heart failure), NYHA class 4 ([facility]) [I50.21]  Atrial fibrillation, unspecified type ([facility] [I48.91]  New onset of congestive heart failure ([facility] [I50.9]     Hospital Course:   Patient is an 80 y/o female with no past medical history (no PCP/MD evaluation in > 15 years), no home medications who presented to ED from Urgent Care with cc SOB, BLE edema, elevated BNP. ED workup: /91 HR 78 stable on RA  EKG with controlled atrial fibrillation  Labs notable for WBC 15.2, Hgb/Hct 15.1/47.5, Plt 520, Na 141, K 3.7, BUN/Cr 11/1.30, pBNP 77827, hs trop 27, TSH 0.649, procal 0.21  CXR showed large right pleural effusion. Patient was given IV Lasix 40 mg in ED and initiated on Heparin gtt. Hospitalist consulted for admission due to suspected new onset acute heart failure. Echo ordered. Cardiology and Pulmonology consulted as well. Echo obtained demonstrating LVEF 15-20% with severe global hypokinesis, abnormal diastolic function, reduced RV systolic function, severe MVR and TVR, LAD and RAD. Patient remains in persistent atrial fibrillation on telemetry monitoring. Cardiology has offered further invasive procedures, but patient declines. Patient wishes to continue rate control anticoagulation strategy with symptomatic treatment for heart failure. BB has been increased, low dose dixogin added, and lasix adjusted to once daily. Discussed initiation of low-dose Eliquis with patient and family; they wish to discuss further with other family members. Hold on initiation at this time. Patient underwent R thoracentesis  with Pulmonology with 1500 cc fluid removed.  Expect transudate, but fluid was sent for testing. Subjective & 24hr Events (12/30/22): Patient alert and oriented. Family present at bedside. Denies chest pain and SOB. Has a slight cough since thoracentesis but otherwise still voiding well and feeling okay. Assessment & Plan:     Acute systolic CHF, NYHA class 4 / New onset   -New diagnosis, Echo noted above  -Strict I&O's, daily weights  -HF education provided, initiated sodium and fluid restriction  -Cardiology following, appreciate recommendations  -Increase Toprol, add dixogin, decrease furosemide    Atrial fibrillation ([address]  -Monitor on telemetry, remains rate controlled  -Cardiology following, appreciate recommendations  -Patient declines invasive procedures, would like rate control strategy, increased Toprol and added digoxin  -Family discussing AC at this time, defer initiation until family decision    Large R Pleural effusion  -s/p R thoracentesis 12/30 with Pulmonology with 1500 cc fluid removed  -Appreciate pulmonary assistance    Discharge planning: Home with [facility] when cleared by Cardiology    Diet:  ADULT DIET; Regular;  Low Sodium (2 gm); 2000 ml  DVT PPx: Eliquis  Code status: Full Code    Hospital Problems:  Principal Problem:    Acute systolic CHF (congestive heart failure), NYHA class 4 (HCC)  Active Problems:    Atrial fibrillation (HCC)    Pleural effusion  Resolved Problems:    New onset of congestive heart failure (HCC)      Objective:   Patient Vitals for the past 24 hrs:   Temp Pulse Resp BP SpO2   12/30/22 1119 98.1 °F (36.7 °C) (!) 111 18 118/68 97 %   12/30/22 0955 -- (!) 113 18 131/89 95 %   12/30/22 0950 -- (!) 110 18 128/83 97 %   12/30/22 0942 -- (!) 111 20 120/72 98 %   12/30/22 0941 -- (!) 104 18 (!) 113/57 98 %   12/30/22 0925 -- (!) 109 18 120/70 97 %   12/30/22 0739 98 °F (36.7 °C) (!) 112 20 120/81 95 %   12/30/22 0340 98.3 °F (36.8 °C) 94 16 106/64 90 %   12/30/22 0130 -- 71 16 -- --   12/29/22 2332 98.2 °F (36.8 °C) 83 16 125/60 92 %   12/29/22 1952 98.6 °F (37 °C) 79 14 114/68 96 %   12/29/22 1914 -- 78 16 -- --   12/29/22 1622 98.1 °F (36.7 °C) 89 18 112/76 95 %       Oxygen Therapy  SpO2: 97 %  Pulse Oximeter Device Mode: Continuous  O2 Device: None (Room air)    Estimated body mass index is 24.18 kg/m² as calculated from the following:    Height as of this encounter: 5' 2\" (1.575 m). Weight as of this encounter: 132 lb 3.2 oz (60 kg). Intake/Output Summary (Last 24 hours) at 12/30/2022 1321  Last data filed at 12/30/2022 1119  Gross per 24 hour   Intake 250 ml   Output 1200 ml   Net -950 ml         Physical Exam:     Blood pressure 118/68, pulse (!) 111, temperature 98.1 °F (36.7 °C), temperature source Oral, resp. rate 18, height 5' 2\" (1.575 m), weight 132 lb 3.2 oz (60 kg), SpO2 97 %. General:    Alert, frail, no acute distress   Head:  Normocephalic, atraumatic  Eyes:  Sclerae appear normal.  Pupils equally round. ENT:  Nares appear normal, no drainage. Moist oral mucosa  Neck:  No restricted ROM. Trachea midline   CV:   IRR. No m/r/g. JVD present  Lungs:   CTAB anterior, no wheezing, no rhonchi. Respirations even and unlabored on RA. Non productive cough  Abdomen: Bowel sounds present. Soft, nontender, nondistended. Extremities: No cyanosis or clubbing. BLE edema +2  Skin:     No rashes and normal coloration. Warm and dry. Neuro:  CN II-XII grossly intact. Sensation intact. A&Ox3. Follows commands. Facial tremor noted. Psych:  Normal mood and affect.       I have personally reviewed labs and tests showing:  Recent Labs:  Recent Results (from the past 48 hour(s))   EKG 12 Lead    Collection Time: 12/28/22  4:42 PM   Result Value Ref Range    Ventricular Rate 94 BPM    QRS Duration 96 ms    Q-T Interval 382 ms    QTc Calculation (Bazett) 477 ms    R Axis 90 degrees    T Axis -5 degrees    Diagnosis Atrial fibrillation    CBC with Auto Differential    Collection Time: 12/28/22  4:45 PM   Result Value Ref Range    WBC 15.2 (H) 4.3 - 11.1 K/uL RBC 5.06 4.05 - 5.2 M/uL    Hemoglobin 15.1 11.7 - 15.4 g/dL    Hematocrit 47.5 (H) 35.8 - 46.3 %    MCV 93.9 82 - 102 FL    MCH 29.8 26.1 - 32.9 PG    MCHC 31.8 31.4 - 35.0 g/dL    RDW 16.2 (H) 11.9 - 14.6 %    Platelets 312 (H) 504 - 450 K/uL    MPV 11.5 9.4 - 12.3 FL    nRBC 0.00 0.0 - 0.2 K/uL    Seg Neutrophils 77 43 - 78 %    Lymphocytes 10 (L) 13 - 44 %    Monocytes 10 4.0 - 12.0 %    Eosinophils % 1 0.5 - 7.8 %    Basophils 1 0.0 - 2.0 %    Immature Granulocytes 1 0.0 - 5.0 %    Segs Absolute 11.6 (H) 1.7 - 8.2 K/UL    Absolute Lymph # 1.5 0.5 - 4.6 K/UL    Absolute Mono # 1.5 (H) 0.1 - 1.3 K/UL    Absolute Eos # 0.2 0.0 - 0.8 K/UL    Basophils Absolute 0.2 0.0 - 0.2 K/UL    Absolute Immature Granulocyte 0.2 0.0 - 0.5 K/UL    RBC Comment SLIGHT  HYPOCHROMIA        WBC Comment Result Confirmed By Smear      Platelet Comment ADEQUATE      Differential Type AUTOMATED     Comprehensive Metabolic Panel    Collection Time: 12/28/22  4:45 PM   Result Value Ref Range    Sodium 141 133 - 143 mmol/L    Potassium 3.7 3.5 - 5.1 mmol/L    Chloride 108 101 - 110 mmol/L    CO2 25 21 - 32 mmol/L    Anion Gap 8 2 - 11 mmol/L    Glucose 102 (H) 65 - 100 mg/dL    BUN 11 8 - 23 MG/DL    Creatinine 1.30 (H) 0.6 - 1.0 MG/DL    Est, Glom Filt Rate 41 (L) >60 ml/min/1.73m2    Calcium 9.2 8.3 - 10.4 MG/DL    Total Bilirubin 2.0 (H) 0.2 - 1.1 MG/DL    ALT 22 12 - 65 U/L    AST 32 15 - 37 U/L    Alk Phosphatase 90 50 - 136 U/L    Total Protein 6.8 6.3 - 8.2 g/dL    Albumin 3.5 3.2 - 4.6 g/dL    Globulin 3.3 2.8 - 4.5 g/dL    Albumin/Globulin Ratio 1.1 0.4 - 1.6     Troponin    Collection Time: 12/28/22  4:45 PM   Result Value Ref Range    Troponin, High Sensitivity 27.0 (H) 0 - 14 pg/mL   Magnesium    Collection Time: 12/28/22  4:45 PM   Result Value Ref Range    Magnesium 2.1 1.8 - 2.4 mg/dL   Brain Natriuretic Peptide    Collection Time: 12/28/22  4:45 PM   Result Value Ref Range    NT Pro-BNP 20,584 (H) <450 PG/ML   Urinalysis w rflx microscopic    Collection Time: 12/28/22  7:57 PM   Result Value Ref Range    Color, UA YELLOW/STRAW      Appearance CLEAR      Specific Gravity, UA 1.008 1.001 - 1.023      pH, Urine 5.5 5.0 - 9.0      Protein, UA Negative NEG mg/dL    Glucose, UA Negative mg/dL    Ketones, Urine Negative NEG mg/dL    Bilirubin Urine Negative NEG      Blood, Urine Negative NEG      Urobilinogen, Urine 0.2 0.2 - 1.0 EU/dL    Nitrite, Urine Negative NEG      Leukocyte Esterase, Urine Negative NEG     TSH    Collection Time: 12/28/22 10:08 PM   Result Value Ref Range    TSH, 3RD GENERATION 0.649 0.358 - 3.740 uIU/mL   Procalcitonin    Collection Time: 12/28/22 10:08 PM   Result Value Ref Range    Procalcitonin 0.21 0.00 - 0.49 ng/mL   CBC    Collection Time: 12/28/22 10:08 PM   Result Value Ref Range    WBC 16.2 (H) 4.3 - 11.1 K/uL    RBC 4.99 4.05 - 5.2 M/uL    Hemoglobin 15.0 11.7 - 15.4 g/dL    Hematocrit 47.3 (H) 35.8 - 46.3 %    MCV 94.8 82 - 102 FL    MCH 30.1 26.1 - 32.9 PG    MCHC 31.7 31.4 - 35.0 g/dL    RDW 15.5 (H) 11.9 - 14.6 %    Platelets 474 (H) 020 - 450 K/uL    MPV 11.1 9.4 - 12.3 FL    nRBC 0.00 0.0 - 0.2 K/uL   APTT    Collection Time: 12/28/22 10:08 PM   Result Value Ref Range    PTT 35.8 (H) 24.5 - 34.2 SEC   Protime-INR    Collection Time: 12/28/22 10:08 PM   Result Value Ref Range    Protime 17.2 (H) 12.6 - 14.3 sec    INR 1.4     Anti-Xa, Unfractionated Heparin    Collection Time: 12/29/22  3:25 AM   Result Value Ref Range    Anti-XA Unfrac Heparin <0.1 (L) 0.3 - 0.7 IU/mL   Comprehensive Metabolic Panel w/ Reflex to MG    Collection Time: 12/29/22  3:25 AM   Result Value Ref Range    Sodium 142 133 - 143 mmol/L    Potassium 3.8 3.5 - 5.1 mmol/L    Chloride 110 101 - 110 mmol/L    CO2 24 21 - 32 mmol/L    Anion Gap 8 2 - 11 mmol/L    Glucose 95 65 - 100 mg/dL    BUN 11 8 - 23 MG/DL    Creatinine 1.30 (H) 0.6 - 1.0 MG/DL    Est, Glom Filt Rate 41 (L) >60 ml/min/1.73m2    Calcium 9.1 8.3 - 10.4 MG/DL Total Bilirubin 1.8 (H) 0.2 - 1.1 MG/DL    ALT 20 12 - 65 U/L    AST 26 15 - 37 U/L    Alk Phosphatase 88 50 - 136 U/L    Total Protein 6.2 (L) 6.3 - 8.2 g/dL    Albumin 3.3 3.2 - 4.6 g/dL    Globulin 2.9 2.8 - 4.5 g/dL    Albumin/Globulin Ratio 1.1 0.4 - 1.6     CBC with Auto Differential    Collection Time: 12/29/22  3:25 AM   Result Value Ref Range    WBC 14.8 (H) 4.3 - 11.1 K/uL    RBC 4.75 4.05 - 5.2 M/uL    Hemoglobin 14.2 11.7 - 15.4 g/dL    Hematocrit 44.9 35.8 - 46.3 %    MCV 94.5 82 - 102 FL    MCH 29.9 26.1 - 32.9 PG    MCHC 31.6 31.4 - 35.0 g/dL    RDW 15.9 (H) 11.9 - 14.6 %    Platelets 831 (H) 045 - 450 K/uL    MPV 11.0 9.4 - 12.3 FL    nRBC 0.00 0.0 - 0.2 K/uL    Differential Type AUTOMATED      Seg Neutrophils 73 43 - 78 %    Lymphocytes 12 (L) 13 - 44 %    Monocytes 12 4.0 - 12.0 %    Eosinophils % 1 0.5 - 7.8 %    Basophils 1 0.0 - 2.0 %    Immature Granulocytes 0 0.0 - 5.0 %    Segs Absolute 10.8 (H) 1.7 - 8.2 K/UL    Absolute Lymph # 1.8 0.5 - 4.6 K/UL    Absolute Mono # 1.8 (H) 0.1 - 1.3 K/UL    Absolute Eos # 0.1 0.0 - 0.8 K/UL    Basophils Absolute 0.2 0.0 - 0.2 K/UL    Absolute Immature Granulocyte 0.1 0.0 - 0.5 K/UL   Anti-Xa, Unfractionated Heparin    Collection Time: 12/29/22 10:06 AM   Result Value Ref Range    Anti-XA Unfrac Heparin <0.10 (L) 0.3 - 0.7 IU/mL   Transthoracic echocardiogram (TTE) complete with contrast, bubble, strain, and 3D PRN    Collection Time: 12/29/22 11:59 AM   Result Value Ref Range    LV EDV A2C 143 mL    LV EDV A4C 144 mL    LV ESV A2C 132 mL    LV ESV A4C 107 mL    IVSd 0.8 0.6 - 0.9 cm    LVIDd 5.0 3.9 - 5.3 cm    LVIDs 4.4 cm    LVOT Diameter 1.8 cm    LVOT Mean Gradient 1 mmHg    LVOT VTI 13.3 cm    LVOT Peak Velocity 0.8 m/s    LVOT Peak Gradient 3 mmHg    LVPWd 0.9 0.6 - 0.9 cm    LV E' Lateral Velocity 12 cm/s    LV E' Septal Velocity 6 cm/s    LV Ejection Fraction A2C 8 %    LV Ejection Fraction A4C 26 %    EF BP 13 (A) 55 - 100 %    LVOT Area 2.5 cm2 LVOT SV 33.8 ml    LA Minor Axis 5.8 cm    LA Major Axis 5.8 cm    LA Area 2C 21.7 cm2    LA Area 4C 20.5 cm2    LA Volume 2C 66 (A) 22 - 52 mL    LA Volume 4C 57 (A) 22 - 52 mL    LA Volume BP 61 (A) 22 - 52 mL    LA Diameter 5.0 cm    AV Mean Velocity 1.0 m/s    AV Mean Gradient 4 mmHg    AV VTI 19.4 cm    AV VTI 19.4 cm    AV Peak Velocity 1.3 m/s    AV Peak Gradient 7 mmHg    AV Area by VTI 1.7 cm2    AV Area by Peak Velocity 1.6 cm2    Aortic Root 2.8 cm    IVC Proxmal 1.8 cm    MR .0 cm    MR Peak Velocity 5.0 m/s    MR Peak Gradient 100 mmHg    PV .0 ms    PV Max Velocity 0.9 m/s    PV Peak Gradient 3 mmHg    RV Basal Dimension 4.0 cm    RV Free Wall Peak S' 6 cm/s    TAPSE 1.4 (A) 1.7 cm    TR Max Velocity 2.81 m/s    TR Peak Gradient 32 mmHg    Body Surface Area 1.64 m2    Fractional Shortening 2D 12 28 - 44 %    LV ESV Index A4C 66 mL/m2    LV EDV Index A4C 89 mL/m2    LV ESV Index A2C 81 mL/m2    LV EDV Index A2C 88 mL/m2    LVIDd Index 3.09 cm/m2    LVIDs Index 2.72 cm/m2    LV RWT Ratio 0.36     LV Mass 2D 146.8 67 - 162 g    LV Mass 2D Index 90.6 43 - 95 g/m2    LA Volume Index BP 38 (A) 16 - 34 ml/m2    LVOT Stroke Volume Index 20.9 mL/m2    LA Volume Index 2C 41 (A) 16 - 34 mL/m2    LA Volume Index 4C 35 (A) 16 - 34 mL/m2    LA Size Index 3.09 cm/m2    LA/AO Root Ratio 1.79     Ao Root Index 1.73 cm/m2    AV Velocity Ratio 0.62     KODAK/BSA VTI 1.0 cm2/m2    KODAK/BSA Peak Velocity 1.0 cm2/m2    Est. RA Pressure 15 mmHg    RVSP 47 mmHg   Anti-Xa, Unfractionated Heparin    Collection Time: 12/29/22  3:28 PM   Result Value Ref Range    Anti-XA Unfrac Heparin <0.10 (L) 0.3 - 0.7 IU/mL   Anti-Xa, Unfractionated Heparin    Collection Time: 12/29/22 11:00 PM   Result Value Ref Range    Anti-XA Unfrac Heparin 0.31 0.3 - 0.7 IU/mL   CBC    Collection Time: 12/30/22  8:38 AM   Result Value Ref Range    WBC 13.5 (H) 4.3 - 11.1 K/uL    RBC 5.11 4.05 - 5.2 M/uL    Hemoglobin 15.4 11.7 - 15.4 g/dL Hematocrit 48.5 (H) 35.8 - 46.3 %    MCV 94.9 82 - 102 FL    MCH 30.1 26.1 - 32.9 PG    MCHC 31.8 31.4 - 35.0 g/dL    RDW 17.1 (H) 11.9 - 14.6 %    Platelets 635 (H) 219 - 450 K/uL    MPV 10.9 9.4 - 12.3 FL    nRBC 0.00 0.0 - 0.2 K/uL   Comprehensive Metabolic Panel w/ Reflex to MG    Collection Time: 12/30/22  8:38 AM   Result Value Ref Range    Sodium 139 133 - 143 mmol/L    Potassium 3.9 3.5 - 5.1 mmol/L    Chloride 104 101 - 110 mmol/L    CO2 30 21 - 32 mmol/L    Anion Gap 5 2 - 11 mmol/L    Glucose 101 (H) 65 - 100 mg/dL    BUN 10 8 - 23 MG/DL    Creatinine 1.40 (H) 0.6 - 1.0 MG/DL    Est, Glom Filt Rate 37 (L) >60 ml/min/1.73m2    Calcium 9.2 8.3 - 10.4 MG/DL    Total Bilirubin 1.8 (H) 0.2 - 1.1 MG/DL    ALT 24 12 - 65 U/L    AST 29 15 - 37 U/L    Alk Phosphatase 81 50 - 136 U/L    Total Protein 6.8 6.3 - 8.2 g/dL    Albumin 3.4 3.2 - 4.6 g/dL    Globulin 3.4 2.8 - 4.5 g/dL    Albumin/Globulin Ratio 1.0 0.4 - 1.6     Body fluid cell count    Collection Time: 12/30/22  9:45 AM   Result Value Ref Range    Specimen RT PLEU     Color, Fluid STRAW      Appearance, Fluid CLEAR      RBC, Fluid <1,000 /cu mm    WBC, Fluid 67 /cu mm   Glucose, Body Fluid    Collection Time: 12/30/22  9:45 AM   Result Value Ref Range    Fluid Type RT PLEU     Glucose, Body Fluid 111 MG/DL   Lactate Dehydrogenase, Body Fluid    Collection Time: 12/30/22  9:45 AM   Result Value Ref Range    Fluid Type RT PLEU     LD, Fluid 100 U/L   Protein, Body Fluid    Collection Time: 12/30/22  9:45 AM   Result Value Ref Range    Fluid Type RT PLEU     Total Protein, Body Fluid 1.9 g/dL       I have personally reviewed imaging studies showing:   Other Studies:  XR CHEST 1 VIEW   Final Result   Large right pleural effusion         XR CHEST 1 VIEW    (Results Pending)       Current Meds:  Current Facility-Administered Medications   Medication Dose Route Frequency    metoprolol succinate (TOPROL XL) extended release tablet 25 mg  25 mg Oral BID    [START ON 12/31/2022] furosemide (LASIX) injection 40 mg  40 mg IntraVENous Daily    digoxin (LANOXIN) tablet 125 mcg  125 mcg Oral Daily    heparin (porcine) injection 3,670 Units  60 Units/kg IntraVENous PRN    heparin (porcine) injection 1,840 Units  30 Units/kg IntraVENous PRN    heparin 25,000 units in dextrose 5% 250 mL (premix) infusion  5-30 Units/kg/hr IntraVENous Continuous    sodium chloride flush 0.9 % injection 5-40 mL  5-40 mL IntraVENous 2 times per day    sodium chloride flush 0.9 % injection 5-40 mL  5-40 mL IntraVENous PRN    0.9 % sodium chloride infusion   IntraVENous PRN    ondansetron (ZOFRAN-ODT) disintegrating tablet 4 mg  4 mg Oral Q8H PRN    Or    ondansetron (ZOFRAN) injection 4 mg  4 mg IntraVENous Q6H PRN    polyethylene glycol (GLYCOLAX) packet 17 g  17 g Oral Daily PRN    acetaminophen (TYLENOL) tablet 650 mg  650 mg Oral Q6H PRN    Or    acetaminophen (TYLENOL) suppository 650 mg  650 mg Rectal Q6H PRN     Signed: Bekah Preciado AGACNP-BC

## 2022-12-30 NOTE — OP NOTE
PROCEDURE:  DIAGNOSTIC THORACENTESIS and THERAPEUTIC THORACENTESIS       PRE-OP DIAGNOSIS:  R PLEURAL EFFUSION    POST-OP DIAGNOSIS:  R PLEURAL EFFUSION    VOLUME REMOVED:    1500cc    ANESTHESIA:    LOCAL ANESTHESIA WITH 1% LIDOCAINE 10 CC TOTAL. CHEST ULTRASOUND FINDINGS:    A Turbo-M, Sonosite ultrasound with a 5-16 mHz probe was used to image the chest and localize the pleural effusion on the right chest.    A large anechoic space was seen on the right consistent with an uncomplicated pleural effusion. DESCRIPTION OF PROCEDURE:    After obtaining informed consent and localizing the safest location for thoracentesis, the  8th intercostal space was marked with a blunt, plastic needle cap in the mid scapular line. An Guest of a Guest [id number] Pleral-Seal thoracentesis kit was used to perform the procedure. The skin was cleansed with the supplied chlorhexidine swab and then draped in the usual fashion. Using the previously marked location as a guide, a 22 G 1.5 inch needle was used to inject 1% lidocaine into the skin and subcutaneous tissue, as well as onto the underlying rib and inter-costal muscles. Pleural fluid was aspirated to assure proper location and additional lidocaine was injected into the pleural space prior to removing the anesthesia needle. A 3mm incision was then made with the supplied scalpel in the usual fashion to facilitate the insertion of the thoracentesis needle. The needle with an 8 Tongan thoracentesis catheter was then introduced into the chest through the previously made incision in the usual fashion, the rib localized with the needle, and the catheter then marched over the rib into the pleural space. After aspirating fluid, the thoracentesis catheter was then placed into the chest using the needle itself as a trocar. The needle was then removed and the catheter was attached to the supplied tubing without complication.     1500 cc of clear, yellow fluid was aspirated and sent for analysis. Fluid was sent for the following tests:      LDH  Total Protein  Glucose  Cell count with differential  Routine culture and Gram stain  Cytology  AFB  Fungus      Post procedure US confirmed complete drainage of the effusion and presence of lung sliding, ruling out pneumothorax.  ([id number])    EBL:     <7MO    COMPLICATIONS:    none      Dev Hyman MD

## 2022-12-30 NOTE — PROGRESS NOTES
ACUTE PHYSICAL THERAPY GOALS:   (Developed with and agreed upon by patient and/or caregiver.)  Developed with and agreed upon by patient and/or caregiver. )  LTG:  (1.)Ms. Monica Johnson will transfer from bed to chair and chair to bed with INDEPENDENT using the least restrictive device within 7 day(s). (2.)Ms. Monica Johnson will ambulate with modified INDEPENDENCE for 500+ feet with the least restrictive/no device within 7 day(s). (3.)Ms. Monica Johnson will perform standing static and dynamic balance activities x 8 minutes with SUPERVISION to improve safety within 7 day(s). PHYSICAL THERAPY: Daily Note PM   (Link to Caseload Tracking: PT Visit Days : 1  Time In/Out PT Charge Capture  Rehab Caseload Tracker  Orders    Jericho El is a 79 y.o. female   PRIMARY DIAGNOSIS: Acute systolic CHF (congestive heart failure), NYHA class 4 (HCC)  Pleural effusion [E08]  Acute systolic CHF (congestive heart failure), NYHA class 4 (HCC) [I50.21]  Atrial fibrillation, unspecified type ([address] [I48.91]  New onset of congestive heart failure ([address] [I50.9]  Procedure(s) (LRB):  THORACENTESIS ULTRASOUND (Bilateral)  Day of Surgery  Inpatient: Payor: Rosemarie Urias / Plan: [facility] MEDICARE COMPLETE / Product Type: *No Product type* /     ASSESSMENT:     REHAB RECOMMENDATIONS:   Recommendation to date pending progress:  Setting:  Home Health Therapy    Equipment:    To Be Determined     ASSESSMENT:  Ms. Monica Johnson is in bed and agreeable to therapy. She has several family members in her room and they are supportive. Bed mobility is with supervision. Sitting and standing balance good. Patient with a little assist donned her robe and ambulated in the hallway x 175 feet with slow  felix and slight hand held assist.  Good session  and progress demonstrated. Patient is left in bed with needs within reach as her lunch has arrived. Question/answers with patient and family. Continue PT efforts. HHPT at d/c. Strong family support noted . SUBJECTIVE:   Ms. Garber Friend states, \"hello\"     Social/Functional Lives With: Spouse  Type of Home: House  Home Layout: One level  Home Access: Level entry  Receives Help From: Family  Ambulation Assistance: Independent  Transfer Assistance: Independent  Active : No  Patient's  Info: Family  OBJECTIVE:     PAIN: Kathleen Chimera / O2: Thermon Matt / Tony Frames / Mello Stapler:   Pre Treatment: 0/10         Post Treatment: 0/10 Vitals        Oxygen    None    RESTRICTIONS/PRECAUTIONS:        MOBILITY: I Mod I S SBA CGA Min Mod Max Total  NT x2 Comments:   Bed Mobility    Rolling [] [] [x] [] [] [] [] [] [] [] []    Supine to Sit [] [] [x] [] [] [] [] [] [] [] []    Scooting [] [] [x] [] [] [] [] [] [] [] []    Sit to Supine [] [] [x] [] [] [] [] [] [] [] []    Transfers    Sit to Stand [] [] [x] [] [] [] [] [] [] [] []    Bed to Chair [] [] [] [] [] [] [] [] [] [x] []    Stand to Sit [] [] [x] [] [] [] [] [] [] [] []     [] [] [] [] [] [] [] [] [] [] []    I=Independent, Mod I=Modified Independent, S=Supervision, SBA=Standby Assistance, CGA=Contact Guard Assistance,   Min=Minimal Assistance, Mod=Moderate Assistance, Max=Maximal Assistance, Total=Total Assistance, NT=Not Tested    BALANCE: Good Fair+ Fair Fair- Poor NT Comments   Sitting Static [x] [] [] [] [] []    Sitting Dynamic [x] [] [] [] [] []              Standing Static [x] [] [] [] [] []    Standing Dynamic [x] [] [] [] [] []      GAIT: I Mod I S SBA CGA Min Mod Max Total  NT x2 Comments:   Level of Assistance [] [] [] [x] [] [] [] [] [] [] []    Distance   175 feet    DME Slight hand held assist     Gait Quality Slow felix     Weightbearing Status      Stairs      I=Independent, Mod I=Modified Independent, S=Supervision, SBA=Standby Assistance, CGA=Contact Guard Assistance,   Min=Minimal Assistance, Mod=Moderate Assistance, Max=Maximal Assistance, Total=Total Assistance, NT=Not Tested    PLAN:   FREQUENCY AND DURATION: 3 times/week for duration of hospital stay or until stated goals are met, whichever comes first.    TREATMENT:   TREATMENT:   Therapeutic Activity (11 Minutes): Therapeutic activity included Supine to Sit, Scooting, Transfer Training, Ambulation on level ground, Sitting balance , and Standing balance to improve functional Activity tolerance, Balance, Coordination, Mobility, and Strength.     TREATMENT GRID:  N/A    AFTER TREATMENT PRECAUTIONS: Bed, Bed/Chair Locked, Call light within reach, Needs within reach, RN notified, and Visitors at bedside    INTERDISCIPLINARY COLLABORATION:  RN/ PCT and PT/ PTA    EDUCATION:  review POC and importance of mobility in the recovery process    TIME IN/OUT:  Time In: 1250  Time Out: 1301  Minutes: 11    Maia Arredondo-Akila, PTA

## 2022-12-30 NOTE — PLAN OF CARE
Problem: Discharge Planning  Goal: Discharge to home or other facility with appropriate resources  Outcome: Progressing  Flowsheets (Taken 12/29/2022 1914)  Discharge to home or other facility with appropriate resources:   Identify barriers to discharge with patient and caregiver   Identify discharge learning needs (meds, wound care, etc)     Problem: Safety - Adult  Goal: Free from fall injury  Outcome: Progressing  Flowsheets (Taken 12/29/2022 1914)  Free From Fall Injury: Instruct family/caregiver on patient safety     Problem: ABCDS Injury Assessment  Goal: Absence of physical injury  Outcome: Progressing

## 2022-12-30 NOTE — NURSE NAVIGATOR
CHF teaching started post introduction to Pt., aware of diagnosis. Planner/scale @ BS and will follow. Smoking/ ETOH/Illicit drug use cessation and maintain a healthy weight covered. Pt. aware that I cannot prescribe nor adjust medications: 15mins    Palliative Care score:11%    Refused ACP on admission. Start 2L/D Fluid restriction/ cardiac diet . Daughter at bedside for education.

## 2022-12-31 ENCOUNTER — APPOINTMENT (OUTPATIENT)
Dept: GENERAL RADIOLOGY | Age: 82
DRG: 292 | End: 2022-12-31
Payer: MEDICARE

## 2022-12-31 LAB
ANION GAP SERPL CALC-SCNC: 7 MMOL/L (ref 2–11)
BUN SERPL-MCNC: 13 MG/DL (ref 8–23)
CALCIUM SERPL-MCNC: 8.6 MG/DL (ref 8.3–10.4)
CHLORIDE SERPL-SCNC: 105 MMOL/L (ref 101–110)
CO2 SERPL-SCNC: 27 MMOL/L (ref 21–32)
CREAT SERPL-MCNC: 1.4 MG/DL (ref 0.6–1)
ERYTHROCYTE [DISTWIDTH] IN BLOOD BY AUTOMATED COUNT: 16.4 % (ref 11.9–14.6)
GLUCOSE SERPL-MCNC: 96 MG/DL (ref 65–100)
HCT VFR BLD AUTO: 46 % (ref 35.8–46.3)
HGB BLD-MCNC: 14.4 G/DL (ref 11.7–15.4)
MAGNESIUM SERPL-MCNC: 1.8 MG/DL (ref 1.8–2.4)
MCH RBC QN AUTO: 29.9 PG (ref 26.1–32.9)
MCHC RBC AUTO-ENTMCNC: 31.3 G/DL (ref 31.4–35)
MCV RBC AUTO: 95.6 FL (ref 82–102)
NRBC # BLD: 0 K/UL (ref 0–0.2)
PLATELET # BLD AUTO: 494 K/UL (ref 150–450)
PMV BLD AUTO: 11 FL (ref 9.4–12.3)
POTASSIUM SERPL-SCNC: 3.5 MMOL/L (ref 3.5–5.1)
RBC # BLD AUTO: 4.81 M/UL (ref 4.05–5.2)
SODIUM SERPL-SCNC: 139 MMOL/L (ref 133–143)
UFH PPP CHRO-ACNC: <0.1 IU/ML (ref 0.3–0.7)
WBC # BLD AUTO: 19.7 K/UL (ref 4.3–11.1)

## 2022-12-31 PROCEDURE — 80048 BASIC METABOLIC PNL TOTAL CA: CPT

## 2022-12-31 PROCEDURE — 6370000000 HC RX 637 (ALT 250 FOR IP): Performed by: STUDENT IN AN ORGANIZED HEALTH CARE EDUCATION/TRAINING PROGRAM

## 2022-12-31 PROCEDURE — 6360000002 HC RX W HCPCS: Performed by: INTERNAL MEDICINE

## 2022-12-31 PROCEDURE — 2580000003 HC RX 258: Performed by: INTERNAL MEDICINE

## 2022-12-31 PROCEDURE — 85027 COMPLETE CBC AUTOMATED: CPT

## 2022-12-31 PROCEDURE — 36415 COLL VENOUS BLD VENIPUNCTURE: CPT

## 2022-12-31 PROCEDURE — 85520 HEPARIN ASSAY: CPT

## 2022-12-31 PROCEDURE — 6370000000 HC RX 637 (ALT 250 FOR IP): Performed by: INTERNAL MEDICINE

## 2022-12-31 PROCEDURE — 71045 X-RAY EXAM CHEST 1 VIEW: CPT

## 2022-12-31 PROCEDURE — 1100000000 HC RM PRIVATE

## 2022-12-31 PROCEDURE — 83735 ASSAY OF MAGNESIUM: CPT

## 2022-12-31 RX ORDER — GUAIFENESIN 600 MG/1
600 TABLET, EXTENDED RELEASE ORAL 2 TIMES DAILY
Status: DISCONTINUED | OUTPATIENT
Start: 2022-12-31 | End: 2023-01-01 | Stop reason: HOSPADM

## 2022-12-31 RX ORDER — TORSEMIDE 20 MG/1
20 TABLET ORAL DAILY
Status: DISCONTINUED | OUTPATIENT
Start: 2022-12-31 | End: 2023-01-01 | Stop reason: HOSPADM

## 2022-12-31 RX ORDER — ASPIRIN 81 MG/1
81 TABLET, CHEWABLE ORAL DAILY
Status: DISCONTINUED | OUTPATIENT
Start: 2022-12-31 | End: 2023-01-01 | Stop reason: HOSPADM

## 2022-12-31 RX ADMIN — GUAIFENESIN 600 MG: 600 TABLET ORAL at 21:42

## 2022-12-31 RX ADMIN — GUAIFENESIN 600 MG: 600 TABLET ORAL at 11:46

## 2022-12-31 RX ADMIN — SODIUM CHLORIDE, PRESERVATIVE FREE 5 ML: 5 INJECTION INTRAVENOUS at 08:04

## 2022-12-31 RX ADMIN — SODIUM CHLORIDE, PRESERVATIVE FREE 10 ML: 5 INJECTION INTRAVENOUS at 21:44

## 2022-12-31 RX ADMIN — EMPAGLIFLOZIN 10 MG: 10 TABLET, FILM COATED ORAL at 11:46

## 2022-12-31 RX ADMIN — DIGOXIN 125 MCG: 125 TABLET ORAL at 08:03

## 2022-12-31 RX ADMIN — METOPROLOL SUCCINATE 25 MG: 25 TABLET, EXTENDED RELEASE ORAL at 21:42

## 2022-12-31 RX ADMIN — METOPROLOL SUCCINATE 25 MG: 25 TABLET, EXTENDED RELEASE ORAL at 11:00

## 2022-12-31 RX ADMIN — FUROSEMIDE 40 MG: 10 INJECTION, SOLUTION INTRAMUSCULAR; INTRAVENOUS at 08:40

## 2022-12-31 RX ADMIN — ASPIRIN 81 MG: 81 TABLET, CHEWABLE ORAL at 16:42

## 2022-12-31 NOTE — PROGRESS NOTES
Spoke with Hospitalist NP. Informed about the slightly low BP and that I had given the lasix already today. Instructed to start the demadex tomorrow.

## 2022-12-31 NOTE — PROGRESS NOTES
Halley Sanchez  Admission Date: 12/28/2022         Daily Progress Note: 12/31/2022    The patient's chart is reviewed and the patient is discussed with the staff. Background: Patient is a 79 y.o.  female seen and evaluated at the request of hospitalist for pleural effusion. Patient arrived to ED on 12/28 with a 2 month history of worsening bilateral leg swelling, orbital swelling and worsening of SOB. She tried to see her PCP but could not see her quickly therefore she went to [facility] who then sent her to ED. BNP at [facility] was >1900. Patient states she has not been to a doctor in 15 years. She was admitted under hospitalist service, started on IV lasix, and consulted cardiology. She has no cardiac or lung history. Other significant labs on arrival include Cr 1.3, proBNP 20,584, trop 27.0, WBC 15.2, plat 520, UA negative. She denies any cardiac history. States the only significant medical history she has is she had pleurisy when she was 8years old and spend time in a sanatorium. She lives with family and no longer drives. Granddaughter states she has been in excellent health but started noticing swelling about 2 months ago that has slowly gotten worse. Patient states she is slightly short of breath with activity but not at rest. Reports a dry cough with deep breathing. On RA. Thoracentesis performed 12/30 with 1500cc removed from right. Transudate. Subjective:     Pt thoracentesis yesterday with 1.5L removed. Still on room air. CXR improved today. Strict I/O ordered but not recorded.          Current Facility-Administered Medications   Medication Dose Route Frequency    torsemide (DEMADEX) tablet 20 mg  20 mg Oral Daily    empagliflozin (JARDIANCE) tablet 10 mg  10 mg Oral Daily    guaiFENesin (MUCINEX) extended release tablet 600 mg  600 mg Oral BID    aspirin chewable tablet 81 mg  81 mg Oral Daily    metoprolol succinate (TOPROL XL) extended release tablet 25 mg 25 mg Oral BID    digoxin (LANOXIN) tablet 125 mcg  125 mcg Oral Daily    sodium chloride flush 0.9 % injection 5-40 mL  5-40 mL IntraVENous 2 times per day    sodium chloride flush 0.9 % injection 5-40 mL  5-40 mL IntraVENous PRN    0.9 % sodium chloride infusion   IntraVENous PRN    ondansetron (ZOFRAN-ODT) disintegrating tablet 4 mg  4 mg Oral Q8H PRN    Or    ondansetron (ZOFRAN) injection 4 mg  4 mg IntraVENous Q6H PRN    polyethylene glycol (GLYCOLAX) packet 17 g  17 g Oral Daily PRN    acetaminophen (TYLENOL) tablet 650 mg  650 mg Oral Q6H PRN    Or    acetaminophen (TYLENOL) suppository 650 mg  650 mg Rectal Q6H PRN     Review of Systems: Comprehensive ROS negative except in HPI  Objective:   Blood pressure (!) 119/58, pulse 93, temperature 99.3 °F (37.4 °C), temperature source Oral, resp. rate 16, height 5' 2\" (1.575 m), weight 132 lb 3.2 oz (60 kg), SpO2 92 %. Intake/Output Summary (Last 24 hours) at 12/31/2022 1229  Last data filed at 12/31/2022 0730  Gross per 24 hour   Intake --   Output 100 ml   Net -100 ml     Physical Exam:   Constitutional:  the patient is well developed and in no acute distress  EENMT:  Sclera clear, pupils equal, oral mucosa moist  Respiratory: symmetric chest rise. Decreased on right  Cardiovascular:  RRR without M,G,R. There is 2+ B lower extremity edema. Gastrointestinal: soft and non-tender; with positive bowel sounds. Musculoskeletal: warm without cyanosis. Normal muscle tone. Skin:  no jaundice or rashes, no wounds   Neurologic: symmetric strength, fluent speech  Psychiatric:  calm, appropriate, oriented x 4    Imaging: I performed an independent interpretation of the patient's images.   CXR:   12/31/22 12/28/22       LAB:  Recent Labs     12/28/22 2208 12/29/22  0325 12/30/22  0838 12/31/22  0333   WBC 16.2* 14.8* 13.5* 19.7*   HGB 15.0 14.2 15.4 14.4   HCT 47.3* 44.9 48.5* 46.0   * 586* 620* 494*   INR 1.4  --   --   --    PROCAL 0.21  --   --   -- Recent Labs     12/28/22  1645 12/29/22  0325 12/30/22  0838 12/31/22  0333    142 139 139   K 3.7 3.8 3.9 3.5    110 104 105   CO2 25 24 30 27   BUN 11 11 10 13   CREATININE 1.30* 1.30* 1.40* 1.40*   MG 2.1  --   --  1.8   BILITOT 2.0* 1.8* 1.8*  --    AST 32 26 29  --    ALT 22 20 24  --    ALKPHOS 90 88 81  --      Recent Labs     12/28/22  1645   TROPHS 27.0*   NTPROBNP 20,584*     Recent Labs     12/29/22  0325 12/30/22  0838 12/31/22  0333   GLUCOSE 95 101* 96      Microbiology:   Recent Labs     12/30/22  0945   CULTURE NO GROWTH 1 DAY     ECHO: 12/28/22    TRANSTHORACIC ECHOCARDIOGRAM (TTE) COMPLETE (CONTRAST/BUBBLE/3D PRN) 12/29/2022  4:05 PM (Final)    Interpretation Summary    Left Ventricle: Severely reduced left ventricular systolic function with a visually estimated EF of 15 - 20%. Left ventricle size is normal. Normal wall thickness. Severe global hypokinesis present. Abnormal diastolic function. Right Ventricle: Reduced systolic function. Mitral Valve: Severe regurgitation. Tricuspid Valve: Severe regurgitation. The estimated RVSP is 47 mmHg. Left Atrium: Left atrium is dilated. Right Atrium: Right atrium is dilated. Technical qualifiers: Procedure performed with the patient in a supine position, color flow Doppler was performed and pulse wave and/or continuous wave Doppler was performed. Contrast used: Definity. Signed by: Radha Adam MD on 12/29/2022  4:05 PM    Assessment and Plan:  (Medical Decision Making)   Principal Problem:    Acute systolic CHF (congestive heart failure), NYHA class 4 ([address]  Plan: EF 15-20%. Cardiology following. Getting IV torsemide. I/O ordered again. Active Problems:    Atrial fibrillation (HCC)  Plan: per cardiology    Pleural effusion  Plan: S/p thora 12/30 with 1.5L removed. CXR may show some re expansion pulmonary edema and residual fluid. Cont to try and get euvolemic and can repeat thoracentesis next week if needed. Will see again after the holiday weekend unless needed sooner. More than 50% of the time documented was spent in face-to-face contact with the patient and in the care of the patient on the floor/unit where the patient is located.     Francisco Quezada MD

## 2022-12-31 NOTE — PROGRESS NOTES
[facility] CARDIOLOGY PROGRESS NOTE           12/31/2022 9:31 AM    Admit Date: 12/28/2022      Subjective:   Patient notes she feels cold. Some cough since thoracentesis but no documented fever. Remains in atrial fibrillation with controlled rate. Average heart rate 84 per telemetry        ROS:  Cardiovascular:  As noted above    Objective:      Vitals:    12/31/22 0433 12/31/22 0727 12/31/22 0803 12/31/22 0830   BP: 115/67 102/74 102/74 (!) 110/91   Pulse: 95 (!) 103 (!) 103    Resp: 18 16     Temp: 98.2 °F (36.8 °C) 98.7 °F (37.1 °C)     TempSrc:  Oral     SpO2: 90% 91%     Weight:       Height:           Physical Exam:  General-No Acute Distress  Neck- supple, no JVD  CV- IRIR with Grade II/VI SANTINO  Lung- clear bilaterally  Abd- soft, nontender, nondistended  Ext- trivial to 1+ edema bilaterally. Skin- warm and dry      Data Review:   Recent Labs     12/31/22  0333 12/30/22  0838 12/29/22  0325   WBC 19.7* 13.5* 14.8*   HGB 14.4 15.4 14.2   HCT 46.0 48.5* 44.9   MCV 95.6 94.9 94.5   * 620* 586*         Recent Labs     12/31/22  0333 12/30/22  0838    139   K 3.5 3.9    104   CO2 27 30   BUN 13 10   CREATININE 1.40* 1.40*   GLUCOSE 96 101*   CALCIUM 8.6 9.2   PROT  --  6.8   LABALBU  --  3.4   BILITOT  --  1.8*   ALKPHOS  --  81   AST  --  29   ALT  --  24   LABGLOM 37* 37*   GLOB  --  3.4         No results for input(s): CKTOTAL, CKMB, CKMBINDEX, DDIMER, TROPONINI in the last 720 hours. Echo (12/29/22): Left Ventricle: Severely reduced left ventricular systolic function with a visually estimated EF of 15 - 20%. Left ventricle size is normal. Normal wall thickness. Severe global hypokinesis present. Abnormal diastolic function. Right Ventricle: Reduced systolic function. Mitral Valve: Severe regurgitation. Tricuspid Valve: Severe regurgitation. The estimated RVSP is 47 mmHg. Left Atrium: Left atrium is dilated. Right Atrium: Right atrium is dilated. Assessment/Plan:     Active Hospital Problems    Atrial fibrillation (HCC)  Continue Toprol and Digoxin. Attempt rate control strategy. Patient declines consideration for SKYLER and cardioversion at the present time. Pleural effusion  Patient is status post thoracentesis. Monitor closely. *Acute systolic CHF (congestive heart failure), NYHA class 4 ([address]  I had a long discussion with patient and daughter. She has a new onset cardiomyopathy with severe LV dysfunction. Apparently she has been having CHF symptoms for the last 3 months. Discussed the possibility of a tachycardia induced cardiomyopathy. Discussed proceeding with SKYLER cardioversion and amiodarone loading as well as cardiac catheterization. Patient declines any invasive procedures. She would like to attempt a rate control anticoagulation strategy with symptomatic treatment for her heart failure. We discussed the difficulty with rate control given her low blood pressure readings. Change diuretic to PO. OK with me for discharge in AM if heart rate remains controlled. Unable to add ACE-I/ARB due to low BP. Add jardiance.            Vania Pope MD

## 2022-12-31 NOTE — PROGRESS NOTES
END OF SHIFT NOTE:    INTAKE/OUTPUT  12/30 0701 - 12/31 0700  In: -   Out: 200 [Urine:200]  Voiding: Yes  Catheter: No  Drain:   External Urinary Catheter (Active)   Site Assessment Clean,dry & intact 12/28/22 1909   Placement Initiated 12/28/22 1909   Securement Method Other (Comment) 12/28/22 1909   Perineal Care Yes 12/28/22 1909   Suction 40 mmgHg continuous 12/28/22 1909               Flatus: Patient does have flatus present. Stool: 0 occurrences. Characteristics:                Emesis:  occurrences. Characteristics:        VITAL SIGNS  Patient Vitals for the past 12 hrs:   Temp Pulse Resp BP SpO2   12/31/22 0433 98.2 °F (36.8 °C) 95 18 115/67 90 %   12/30/22 2349 98.2 °F (36.8 °C) 100 19 (!) 96/54 92 %   12/30/22 2200 -- (!) 118 -- 110/69 --   12/30/22 2034 98.2 °F (36.8 °C) (!) 114 18 (!) 91/55 90 %       Pain Assessment             Ambulating  Yes    Shift report given to oncoming nurse at the bedside.     Margi Calderon RN

## 2022-12-31 NOTE — PROGRESS NOTES
Hospitalist Progress Note   Admit Date:  2022  4:39 PM   Name:  Whitney Arellano   Age:  79 y.o. Sex:  female  :  1940   MRN:  [id number]   Room:      Presenting Complaint: Leg Swelling     Reason(s) for Admission: Pleural effusion [L42]  Acute systolic CHF (congestive heart failure), NYHA class 4 (HCC) [I50.21]  Atrial fibrillation, unspecified type ([facility] [address] [I48.91]  New onset of congestive heart failure ([facility] [address] [I50.9]     Hospital Course:   Patient is an 78 y/o female with no past medical history (no PCP/MD evaluation in > 15 years), no home medications who presented to ED from Urgent Care with cc SOB, BLE edema, elevated BNP. ED workup: /91 HR 78 stable on RA  EKG with controlled atrial fibrillation  Labs notable for WBC 15.2, Hgb/Hct 15.1/47.5, Plt 520, Na 141, K 3.7, BUN/Cr 11/1.30, pBNP 08433, hs trop 27, TSH 0.649, procal 0.21  CXR showed large right pleural effusion. Patient was given IV Lasix 40 mg in ED and initiated on Heparin gtt. Hospitalist consulted for admission due to suspected new onset acute heart failure. Cardiology and Pulmonology consulted. Echo obtained demonstrating LVEF 15-20% with severe global hypokinesis, abnormal diastolic function, reduced RV systolic function, severe MVR and TVR, LAD and RAD. Patient remains in persistent atrial fibrillation on telemetry monitoring. Cardiology has offered further invasive procedures, but patient declines. Patient wishes to continue rate control anticoagulation strategy with symptomatic treatment for heart failure. Cardiology adjusting medications still, remain inpatient on telemetry at this time. Patient underwent R thoracentesis  with Pulmonology with 1500 cc fluid removed. Expect transudate, but fluid was sent for testing. I have had lengthy discussions  with patient and family regarding recommendations for initiation of oral anticoagulation. Patient is a high cva risk given atrial fibrillation and CHF. Family and patient are concerned with risks associated with anticoagulation. At this time, they verbalize understanding of cva risk but declined Eliquis initiation at this time. They have asked for initiation of aspirin to reduce cva risk. Subjective & 24hr Events (12/31/22): Patient alert and oriented. Family present at bedside. Denies chest pain and SOB. Slight cough since thoracentesis that is improving today. Another lengthy discussion regarding anticoagulation initiation; family and patient decline initiation at this time and have asked for asa. Assessment & Plan:     Acute systolic CHF, NYHA class 4 / New onset   -New diagnosis, Echo noted above  -Strict I&O's, daily weights  -HF education provided, initiated sodium and fluid restriction  -Cardiology following  -Remains on digoxin, toprol-xl, furosemide adjusted to torsemide, initiated on jardiance    Atrial fibrillation [facility])  -Cardiology following  -Patient declines invasive procedures, would like rate control strategy  -Family and patient decline [address] initiation   -Telemetry monitoring ongoing, converted to NSR today however remains with frequent PAC's and some intermittent tachy burst, remains on digoxin and toprol-XL    Large R Pleural effusion  -s/p R thoracentesis 12/30 with Pulmonology with 1500 cc fluid removed  -Appreciate pulmonary assistance    Discharge planning: Home with [facility] when cleared by Cardiology, possibly in am    Diet:  ADULT DIET; Regular;  Low Sodium (2 gm); 2000 ml  DVT PPx: SCD's / Decline [address]  Code status: Full Code    Hospital Problems:  Principal Problem:    Acute systolic CHF (congestive heart failure), NYHA class 4 (HCC)  Active Problems:    Atrial fibrillation (HCC)    Pleural effusion  Resolved Problems:    New onset of congestive heart failure (HCC)      Objective:   Patient Vitals for the past 24 hrs:   Temp Pulse Resp BP SpO2   12/31/22 1100 -- 93 -- (!) 119/58 --   12/31/22 1046 99.3 °F (37.4 °C) 93 16 (!) 119/58 92 % 12/31/22 0830 -- -- -- (!) 110/91 --   12/31/22 0803 -- (!) 103 -- 102/74 --   12/31/22 0730 -- 84 -- -- --   12/31/22 0727 98.7 °F (37.1 °C) (!) 103 16 102/74 91 %   12/31/22 0433 98.2 °F (36.8 °C) 95 18 115/67 90 %   12/30/22 2349 98.2 °F (36.8 °C) 100 19 (!) 96/54 92 %   12/30/22 2200 -- (!) 118 -- 110/69 --   12/30/22 2034 98.2 °F (36.8 °C) (!) 114 18 (!) 91/55 90 %   12/30/22 1526 98.2 °F (36.8 °C) 89 18 122/70 93 %       Oxygen Therapy  SpO2: 92 %  Pulse Oximeter Device Mode: Continuous  O2 Device: None (Room air)    Estimated body mass index is 24.18 kg/m² as calculated from the following:    Height as of this encounter: 5' 2\" (1.575 m). Weight as of this encounter: 132 lb 3.2 oz (60 kg). Intake/Output Summary (Last 24 hours) at 12/31/2022 1210  Last data filed at 12/31/2022 0730  Gross per 24 hour   Intake --   Output 100 ml   Net -100 ml         Physical Exam:     Blood pressure (!) 119/58, pulse 93, temperature 99.3 °F (37.4 °C), temperature source Oral, resp. rate 16, height 5' 2\" (1.575 m), weight 132 lb 3.2 oz (60 kg), SpO2 92 %. General:    Alert, frail, no acute distress   Head:  Normocephalic, atraumatic  Eyes:  Sclerae appear normal.  Pupils equally round. ENT:  Nares appear normal, no drainage. Moist oral mucosa  Neck:  No restricted ROM. Trachea midline   CV:   IRR. No m/r/g. JVD present  Lungs:   CTAB anterior, no wheezing, no rhonchi. Respirations even and unlabored on RA. Abdomen: Bowel sounds present. Soft, nontender, nondistended. Extremities: No cyanosis or clubbing. BLE edema +2  Skin:     No rashes and normal coloration. Warm and dry. Neuro:  CN II-XII grossly intact. Sensation intact. A&Ox3. Follows commands. Facial tremor noted. Psych:  Normal mood and affect.       I have personally reviewed labs and tests showing:  Recent Labs:  Recent Results (from the past 48 hour(s))   Anti-Xa, Unfractionated Heparin    Collection Time: 12/29/22  3:28 PM   Result Value Ref Range    Anti-XA Unfrac Heparin <0.10 (L) 0.3 - 0.7 IU/mL   Anti-Xa, Unfractionated Heparin    Collection Time: 12/29/22 11:00 PM   Result Value Ref Range    Anti-XA Unfrac Heparin 0.31 0.3 - 0.7 IU/mL   CBC    Collection Time: 12/30/22  8:38 AM   Result Value Ref Range    WBC 13.5 (H) 4.3 - 11.1 K/uL    RBC 5.11 4.05 - 5.2 M/uL    Hemoglobin 15.4 11.7 - 15.4 g/dL    Hematocrit 48.5 (H) 35.8 - 46.3 %    MCV 94.9 82 - 102 FL    MCH 30.1 26.1 - 32.9 PG    MCHC 31.8 31.4 - 35.0 g/dL    RDW 17.1 (H) 11.9 - 14.6 %    Platelets 388 (H) 015 - 450 K/uL    MPV 10.9 9.4 - 12.3 FL    nRBC 0.00 0.0 - 0.2 K/uL   Comprehensive Metabolic Panel w/ Reflex to MG    Collection Time: 12/30/22  8:38 AM   Result Value Ref Range    Sodium 139 133 - 143 mmol/L    Potassium 3.9 3.5 - 5.1 mmol/L    Chloride 104 101 - 110 mmol/L    CO2 30 21 - 32 mmol/L    Anion Gap 5 2 - 11 mmol/L    Glucose 101 (H) 65 - 100 mg/dL    BUN 10 8 - 23 MG/DL    Creatinine 1.40 (H) 0.6 - 1.0 MG/DL    Est, Glom Filt Rate 37 (L) >60 ml/min/1.73m2    Calcium 9.2 8.3 - 10.4 MG/DL    Total Bilirubin 1.8 (H) 0.2 - 1.1 MG/DL    ALT 24 12 - 65 U/L    AST 29 15 - 37 U/L    Alk Phosphatase 81 50 - 136 U/L    Total Protein 6.8 6.3 - 8.2 g/dL    Albumin 3.4 3.2 - 4.6 g/dL    Globulin 3.4 2.8 - 4.5 g/dL    Albumin/Globulin Ratio 1.0 0.4 - 1.6     Body fluid cell count    Collection Time: 12/30/22  9:45 AM   Result Value Ref Range    Specimen RT PLEU     Color, Fluid STRAW      Appearance, Fluid CLEAR      RBC, Fluid <1,000 /cu mm    WBC, Fluid 67 /cu mm   Glucose, Body Fluid    Collection Time: 12/30/22  9:45 AM   Result Value Ref Range    Fluid Type RT PLEU     Glucose, Body Fluid 111 MG/DL   Lactate Dehydrogenase, Body Fluid    Collection Time: 12/30/22  9:45 AM   Result Value Ref Range    Fluid Type RT PLEU     LD, Fluid 100 U/L   Protein, Body Fluid    Collection Time: 12/30/22  9:45 AM   Result Value Ref Range    Fluid Type RT PLEU     Total Protein, Body Fluid 1.9 g/dL   Culture, Body Fluid    Collection Time: 12/30/22  9:45 AM    Specimen: Pleural Fluid    RIGHT   Result Value Ref Range    Special Requests NO SPECIAL REQUESTS      Gram stain NO WBCS SEEN      Gram stain NO DEFINITE ORGANISM SEEN      Culture NO GROWTH 1 DAY     Anti-Xa, Unfractionated Heparin    Collection Time: 12/31/22  3:33 AM   Result Value Ref Range    Anti-XA Unfrac Heparin <0.10 (L) 0.3 - 0.7 IU/mL   CBC    Collection Time: 12/31/22  3:33 AM   Result Value Ref Range    WBC 19.7 (H) 4.3 - 11.1 K/uL    RBC 4.81 4.05 - 5.2 M/uL    Hemoglobin 14.4 11.7 - 15.4 g/dL    Hematocrit 46.0 35.8 - 46.3 %    MCV 95.6 82 - 102 FL    MCH 29.9 26.1 - 32.9 PG    MCHC 31.3 (L) 31.4 - 35.0 g/dL    RDW 16.4 (H) 11.9 - 14.6 %    Platelets 345 (H) 800 - 450 K/uL    MPV 11.0 9.4 - 12.3 FL    nRBC 0.00 0.0 - 0.2 K/uL   Basic Metabolic Panel w/ Reflex to MG    Collection Time: 12/31/22  3:33 AM   Result Value Ref Range    Sodium 139 133 - 143 mmol/L    Potassium 3.5 3.5 - 5.1 mmol/L    Chloride 105 101 - 110 mmol/L    CO2 27 21 - 32 mmol/L    Anion Gap 7 2 - 11 mmol/L    Glucose 96 65 - 100 mg/dL    BUN 13 8 - 23 MG/DL    Creatinine 1.40 (H) 0.6 - 1.0 MG/DL    Est, Glom Filt Rate 37 (L) >60 ml/min/1.73m2    Calcium 8.6 8.3 - 10.4 MG/DL   Magnesium    Collection Time: 12/31/22  3:33 AM   Result Value Ref Range    Magnesium 1.8 1.8 - 2.4 mg/dL       I have personally reviewed imaging studies showing: Other Studies:  XR CHEST 1 VIEW   Final Result   Decreased right pleural effusion, with no pneumothorax.       XR CHEST 1 VIEW   Final Result   Large right pleural effusion             Current Meds:  Current Facility-Administered Medications   Medication Dose Route Frequency    torsemide (DEMADEX) tablet 20 mg  20 mg Oral Daily    empagliflozin (JARDIANCE) tablet 10 mg  10 mg Oral Daily    guaiFENesin (MUCINEX) extended release tablet 600 mg  600 mg Oral BID    metoprolol succinate (TOPROL XL) extended release tablet 25 mg  25 mg Oral BID    digoxin (LANOXIN) tablet 125 mcg  125 mcg Oral Daily    sodium chloride flush 0.9 % injection 5-40 mL  5-40 mL IntraVENous 2 times per day    sodium chloride flush 0.9 % injection 5-40 mL  5-40 mL IntraVENous PRN    0.9 % sodium chloride infusion   IntraVENous PRN    ondansetron (ZOFRAN-ODT) disintegrating tablet 4 mg  4 mg Oral Q8H PRN    Or    ondansetron (ZOFRAN) injection 4 mg  4 mg IntraVENous Q6H PRN    polyethylene glycol (GLYCOLAX) packet 17 g  17 g Oral Daily PRN    acetaminophen (TYLENOL) tablet 650 mg  650 mg Oral Q6H PRN    Or    acetaminophen (TYLENOL) suppository 650 mg  650 mg Rectal Q6H PRN     Signed: Rod Gupta AGACNP-BC

## 2023-01-01 ENCOUNTER — APPOINTMENT (OUTPATIENT)
Dept: GENERAL RADIOLOGY | Age: 83
DRG: 292 | End: 2023-01-01
Payer: MEDICARE

## 2023-01-01 VITALS
SYSTOLIC BLOOD PRESSURE: 101 MMHG | WEIGHT: 122 LBS | HEART RATE: 75 BPM | HEIGHT: 62 IN | BODY MASS INDEX: 22.45 KG/M2 | OXYGEN SATURATION: 91 % | TEMPERATURE: 98.6 F | DIASTOLIC BLOOD PRESSURE: 63 MMHG | RESPIRATION RATE: 18 BRPM

## 2023-01-01 PROBLEM — J90 PLEURAL EFFUSION: Status: RESOLVED | Noted: 2022-12-28 | Resolved: 2023-01-01

## 2023-01-01 LAB
ACID FAST STN SPEC: NEGATIVE
ANION GAP SERPL CALC-SCNC: 6 MMOL/L (ref 2–11)
BACTERIA SPEC CULT: NORMAL
BASOPHILS # BLD: 0.1 K/UL (ref 0–0.2)
BASOPHILS NFR BLD: 1 % (ref 0–2)
BUN SERPL-MCNC: 17 MG/DL (ref 8–23)
CALCIUM SERPL-MCNC: 8.3 MG/DL (ref 8.3–10.4)
CHLORIDE SERPL-SCNC: 104 MMOL/L (ref 101–110)
CO2 SERPL-SCNC: 28 MMOL/L (ref 21–32)
CREAT SERPL-MCNC: 1.6 MG/DL (ref 0.6–1)
DIFFERENTIAL METHOD BLD: ABNORMAL
EOSINOPHIL # BLD: 0 K/UL (ref 0–0.8)
EOSINOPHIL NFR BLD: 0 % (ref 0.5–7.8)
ERYTHROCYTE [DISTWIDTH] IN BLOOD BY AUTOMATED COUNT: 17.4 % (ref 11.9–14.6)
GLUCOSE SERPL-MCNC: 89 MG/DL (ref 65–100)
GRAM STN SPEC: NORMAL
GRAM STN SPEC: NORMAL
HCT VFR BLD AUTO: 45.5 % (ref 35.8–46.3)
HGB BLD-MCNC: 14.4 G/DL (ref 11.7–15.4)
IMM GRANULOCYTES # BLD AUTO: 0.1 K/UL (ref 0–0.5)
IMM GRANULOCYTES NFR BLD AUTO: 1 % (ref 0–5)
LYMPHOCYTES # BLD: 1.4 K/UL (ref 0.5–4.6)
LYMPHOCYTES NFR BLD: 10 % (ref 13–44)
MCH RBC QN AUTO: 29.8 PG (ref 26.1–32.9)
MCHC RBC AUTO-ENTMCNC: 31.6 G/DL (ref 31.4–35)
MCV RBC AUTO: 94 FL (ref 82–102)
MONOCYTES # BLD: 3.2 K/UL (ref 0.1–1.3)
MONOCYTES NFR BLD: 25 % (ref 4–12)
MYCOBACTERIUM SPEC QL CULT: NORMAL
NEUTS SEG # BLD: 8.2 K/UL (ref 1.7–8.2)
NEUTS SEG NFR BLD: 64 % (ref 43–78)
NRBC # BLD: 0 K/UL (ref 0–0.2)
PLATELET # BLD AUTO: 436 K/UL (ref 150–450)
PMV BLD AUTO: 11.4 FL (ref 9.4–12.3)
POTASSIUM SERPL-SCNC: 3.9 MMOL/L (ref 3.5–5.1)
PROCALCITONIN SERPL-MCNC: 0.57 NG/ML (ref 0–0.49)
RBC # BLD AUTO: 4.84 M/UL (ref 4.05–5.2)
SERVICE CMNT-IMP: NORMAL
SODIUM SERPL-SCNC: 138 MMOL/L (ref 133–143)
SPECIMEN PREPARATION: NORMAL
SPECIMEN SOURCE: NORMAL
WBC # BLD AUTO: 13 K/UL (ref 4.3–11.1)

## 2023-01-01 PROCEDURE — 6370000000 HC RX 637 (ALT 250 FOR IP): Performed by: INTERNAL MEDICINE

## 2023-01-01 PROCEDURE — 94761 N-INVAS EAR/PLS OXIMETRY MLT: CPT

## 2023-01-01 PROCEDURE — 85025 COMPLETE CBC W/AUTO DIFF WBC: CPT

## 2023-01-01 PROCEDURE — 6370000000 HC RX 637 (ALT 250 FOR IP): Performed by: STUDENT IN AN ORGANIZED HEALTH CARE EDUCATION/TRAINING PROGRAM

## 2023-01-01 PROCEDURE — 51798 US URINE CAPACITY MEASURE: CPT

## 2023-01-01 PROCEDURE — 84145 PROCALCITONIN (PCT): CPT

## 2023-01-01 PROCEDURE — 36415 COLL VENOUS BLD VENIPUNCTURE: CPT

## 2023-01-01 PROCEDURE — 71045 X-RAY EXAM CHEST 1 VIEW: CPT

## 2023-01-01 PROCEDURE — 80048 BASIC METABOLIC PNL TOTAL CA: CPT

## 2023-01-01 RX ORDER — DOXYCYCLINE HYCLATE 100 MG
100 TABLET ORAL 2 TIMES DAILY
Qty: 10 TABLET | Refills: 0 | Status: SHIPPED | OUTPATIENT
Start: 2023-01-01 | End: 2023-01-05 | Stop reason: ALTCHOICE

## 2023-01-01 RX ORDER — ASPIRIN 81 MG/1
81 TABLET, CHEWABLE ORAL DAILY
Qty: 30 TABLET | Refills: 0 | Status: SHIPPED | OUTPATIENT
Start: 2023-01-02 | End: 2023-02-01

## 2023-01-01 RX ORDER — METOPROLOL SUCCINATE 25 MG/1
25 TABLET, EXTENDED RELEASE ORAL 2 TIMES DAILY
Qty: 60 TABLET | Refills: 0 | Status: SHIPPED | OUTPATIENT
Start: 2023-01-01 | End: 2023-01-31

## 2023-01-01 RX ORDER — DIGOXIN 125 MCG
125 TABLET ORAL DAILY
Qty: 30 TABLET | Refills: 0 | Status: SHIPPED | OUTPATIENT
Start: 2023-01-02 | End: 2023-02-01

## 2023-01-01 RX ORDER — GUAIFENESIN 600 MG/1
600 TABLET, EXTENDED RELEASE ORAL 2 TIMES DAILY
Qty: 10 TABLET | Refills: 0 | Status: SHIPPED | OUTPATIENT
Start: 2023-01-01 | End: 2023-01-06

## 2023-01-01 RX ORDER — TORSEMIDE 20 MG/1
20 TABLET ORAL DAILY
Qty: 30 TABLET | Refills: 0 | Status: SHIPPED | OUTPATIENT
Start: 2023-01-02 | End: 2023-02-01

## 2023-01-01 RX ADMIN — DIGOXIN 125 MCG: 125 TABLET ORAL at 08:38

## 2023-01-01 RX ADMIN — EMPAGLIFLOZIN 10 MG: 10 TABLET, FILM COATED ORAL at 08:38

## 2023-01-01 RX ADMIN — TORSEMIDE 20 MG: 20 TABLET ORAL at 08:37

## 2023-01-01 RX ADMIN — ASPIRIN 81 MG: 81 TABLET, CHEWABLE ORAL at 08:38

## 2023-01-01 RX ADMIN — METOPROLOL SUCCINATE 25 MG: 25 TABLET, EXTENDED RELEASE ORAL at 08:37

## 2023-01-01 RX ADMIN — GUAIFENESIN 600 MG: 600 TABLET ORAL at 08:37

## 2023-01-01 NOTE — PROGRESS NOTES
END OF SHIFT NOTE:    INTAKE/OUTPUT-  12/31 0701 - 01/01 0700  In: -   Out: 550 [Urine:550]  Voiding: Yes  Catheter: No  Drain:              Flatus: Patient does have flatus present. Stool:0  occurrences. Characteristics:  Stool Appearance:  (No stool to assess)        Stool Assessment  Stool Appearance:  (No stool to assess)  Last BM (including prior to admit): 12/31/22 (per daughter report)    Emesis:  occurrences. Characteristics:        VITAL SIGNS  Patient Vitals for the past 12 hrs:   Temp Pulse Resp BP SpO2   01/01/23 0346 99.3 °F (37.4 °C) (!) 101 19 108/68 91 %   12/31/22 2333 99.3 °F (37.4 °C) 92 18 102/79 90 %   12/31/22 2006 98.6 °F (37 °C) (!) 107 17 109/69 91 %       Pain Assessment             Ambulating  Yes    Shift report given to oncoming nurse at the bedside.     Chikis Hernandez RN

## 2023-01-01 NOTE — NURSE NAVIGATOR
CHF teaching continues to pt/family. Emphasis on taking prescription meds as ordered, to keep F/U appts and to call MD STAT if any of the following occur:    · If you gain 2 lbs in one day or 5 lbs in a week, and short of breath. · If you cannot lay flat without developing short of breath or rapid breathing at night; or if it wakes you up. Develop a cough or wheezing. · If you notice swollen hands/feet/ankles or stomach with a bloated/ full feeling. · If you become confused or mentally fuzzy or dizzy. · If you notice a rapid or change in your heart rate. · If you become more exhausted all the time and unable to do the same level of activity without stopping to catch your breath. Drink no more than 8 cups a day in 8 oz. cups. Your Heart can not handle any more. Stay away from salt (limit anything with salt or sodium in it). Limit to 250mg per serving. Pt/family verbalizes understanding, will follow to reinforce teaching skills: 20 mins    CHF teaching completed, verbalize emphasis on monitoring self and report to MD:    · If you gain 2 lbs in one day or 5 lbs in a week, and short of breath. · If you cannot lay flat without developing short of breath or rapid breathing at night; or if it wakes you up. Develop a cough or wheezing. · If you notice swollen hands/feet/ankles or stomach with a bloated/ full feeling. · If you are more confused or mentally fuzzy or dizzy. · If you notice a rapid or change in your heart rate. · If you become more exhausted all the time and unable to do the same level of activity without stopping to catch your breath. Drink no more than 8 cups a day in 8 oz. cups. Limit Cola Drinks. Your Heart can not handle any more. Stay away from salt (limit anything with salt or sodium in it). Limit to 250mg per serving. Exercise needs to be started with your Doctors approval.       Reduce stress; Call myself or Provider if assistance is needed.     Pass posttest via teach back, will make self-available post DC ,if an questions arise. Diabetic teaching completed. Planner/scale @ BS: 60 mins total. Daughter at bedside.

## 2023-01-01 NOTE — DISCHARGE SUMMARY
Hospitalist Discharge Summary   Admit Date:  2022  4:39 PM   DC Note date: 2023  Name:  Ja Joshua   Age:  79 y.o. Sex:  female  :  1940   MRN:  [id number]   Room:    PCP:  None None    Presenting Complaint: Leg Swelling     Initial Admission Diagnosis: Pleural effusion [I18]  Acute systolic CHF (congestive heart failure), NYHA class 4 (HCC) [I50.21]  Atrial fibrillation, unspecified type ([address] [I48.91]  New onset of congestive heart failure ([address] [I50.9]     Problem List for this Hospitalization (present on admission):    Principal Problem:    Acute systolic CHF (congestive heart failure), NYHA class 4 ([address]  Active Problems:    Atrial fibrillation ([address]  Resolved Problems:    New onset of congestive heart failure (HCC)    Pleural effusion      Hospital Course:  Patient is an 80 y/o female with no past medical history (no PCP/MD evaluation in > 15 years), no home medications who presented to ED from Urgent Care with cc SOB, BLE edema, elevated BNP. ED workup: /91 HR 78 stable on RA  EKG with controlled atrial fibrillation  Labs notable for WBC 15.2, Hgb/Hct 15.1/47.5, Plt 520, Na 141, K 3.7, BUN/Cr 11/1.30, pBNP 86862, hs trop 27, TSH 0.649, procal 0.21  CXR showed large right pleural effusion. Patient was given IV Lasix 40 mg in ED and initiated on Heparin gtt. Hospitalist consulted for admission due to suspected new onset acute heart failure. Cardiology and Pulmonology consulted. Echo obtained demonstrating LVEF 15-20% with severe global hypokinesis, abnormal diastolic function, reduced RV systolic function, severe MVR and TVR, LAD and RAD. Patient remained in persistent atrial fibrillation on telemetry monitoring. Cardiology has offered further invasive procedures, but patient declines. Patient wishes to continue rate control anticoagulation strategy with symptomatic treatment for heart failure. Patient converted to NSR with medication adjustment.  Patient underwent R thoracentesis 12/30 with Pulmonology with 1500 cc fluid removed, fluid transudate. Oximetry test prior to discharge with no supplemental O2 requirements. Will send home with Doxycyline course given mild leukocytosis and elevated procal with complete of productive cough. Remains afebrile with no CXR evidence of pneumonia. Lengthy discussion with patient and family regarding recommendations for initiation of oral anticoagulation. Patient is a high cva risk given atrial fibrillation and CHF. Family and patient are concerned with risks associated with anticoagulation. At this time, they verbalize understanding of cva risk but declined Eliquis initiation at this time. They have asked for initiation of aspirin to reduce cva risk. Cardiology and Pulmonology have cleared patient to discharge home today. Medications recommendations reviewed with patient and family. Extensive HF education provided by Cardiology team. Patient will need both Cardiology and Pulmonology follow-ups. [facility] [place] for new PCP with [address] [facility] referral also placed. Discharge plan was discussed with patient, family at bedside, care team. Dr. Kieran Godwin. All questions answered. Patient was stable at time of discharge. Instructions given to call a physician or return if any concerns. Discussed s/sx that would prompt need for return evaluation in ED. Disposition: Home  Diet: ADULT DIET; Regular; Low Sodium (2 gm); 2000 ml  Code Status: Full Code    Follow Ups:   Follow-up Information     [facility]. Schedule an appointment as soon as possible for a   visit in 1 week(s). Specialty: Cardiology  Contact information:  Rodrick Cadet [address] [id number]  [facility] [address] [facility].     Specialty: [address] [place] [address]  Why: [phone]  Contact information:  [facility] Follow up. Specialty: Pulmonology  Why: Office will call to schedule a 1 week follow-up  Contact information:  [address] [facility]  [phone]           [address] [place] [address] [address] Follow up. Why: Referral has been placed for new patient appointment. Anticipate   phone call to schedule. Time spent in patient discharge and coordination 40 minutes. Follow up labs/diagnostics: BMP / CBC 1 week    Current Discharge Medication List        START taking these medications    Details   aspirin 81 MG chewable tablet Take 1 tablet by mouth daily  Qty: 30 tablet, Refills: 0      empagliflozin (JARDIANCE) 10 MG tablet Take 1 tablet by mouth daily  Qty: 30 tablet, Refills: 0      metoprolol succinate (TOPROL XL) 25 MG extended release tablet Take 1 tablet by mouth in the morning and at bedtime  Qty: 60 tablet, Refills: 0      digoxin (LANOXIN) 125 MCG tablet Take 1 tablet by mouth daily  Qty: 30 tablet, Refills: 0      torsemide (DEMADEX) 20 MG tablet Take 1 tablet by mouth daily  Qty: 30 tablet, Refills: 0      guaiFENesin (MUCINEX) 600 MG extended release tablet Take 1 tablet by mouth 2 times daily for 5 days  Qty: 10 tablet, Refills: 0      doxycycline hyclate (VIBRA-TABS) 100 MG tablet Take 1 tablet by mouth 2 times daily for 5 days  Qty: 10 tablet, Refills: 0             Procedures done this admission:  Procedure(s):  THORACENTESIS    Consults this admission:  IP CONSULT TO CARDIOLOGY  IP CONSULT TO PULMONOLOGY  IP CONSULT TO CASE MANAGEMENT  IP Jamil GUPTA/ Tam [facility]    Echocardiogram results:  12/28/22    TRANSTHORACIC ECHOCARDIOGRAM (TTE) COMPLETE (CONTRAST/BUBBLE/3D PRN) 12/29/2022  4:05 PM (Final)    Interpretation Summary    Left Ventricle: Severely reduced left ventricular systolic function with a visually estimated EF of 15 - 20%. Left ventricle size is normal. Normal wall thickness.  Severe global hypokinesis present. Abnormal diastolic function. Right Ventricle: Reduced systolic function. Mitral Valve: Severe regurgitation. Tricuspid Valve: Severe regurgitation. The estimated RVSP is 47 mmHg. Left Atrium: Left atrium is dilated. Right Atrium: Right atrium is dilated. Technical qualifiers: Procedure performed with the patient in a supine position, color flow Doppler was performed and pulse wave and/or continuous wave Doppler was performed. Contrast used: Definity. Signed by: Scooter Hartman MD on 12/29/2022  4:05 PM      Diagnostic Imaging/Tests:   XR CHEST PORTABLE    Result Date: 1/1/2023  1. Improving right-sided airspace changes and effusion without evolving acute changes. Specifically, no pneumothorax is seen. Only stable cardiomegaly is otherwise seen. XR CHEST 1 VIEW    Result Date: 12/31/2022  Decreased right pleural effusion, with no pneumothorax.     XR CHEST 1 VIEW    Result Date: 12/28/2022  Large right pleural effusion        Labs: Results:       BMP, Mg, Phos Recent Labs     12/30/22  0838 12/31/22  0333 01/01/23  0720    139 138   K 3.9 3.5 3.9    105 104   CO2 30 27 28   ANIONGAP 5 7 6   BUN 10 13 17   CREATININE 1.40* 1.40* 1.60*   LABGLOM 37* 37* 32*   CALCIUM 9.2 8.6 8.3   GLUCOSE 101* 96 89   MG  --  1.8  --       CBC Recent Labs     12/30/22  0838 12/31/22  0333 01/01/23  0720   WBC 13.5* 19.7* 13.0*   RBC 5.11 4.81 4.84   HGB 15.4 14.4 14.4   HCT 48.5* 46.0 45.5   MCV 94.9 95.6 94.0   MCH 30.1 29.9 29.8   MCHC 31.8 31.3* 31.6   RDW 17.1* 16.4* 17.4*   * 494* 436   MPV 10.9 11.0 11.4   NRBC 0.00 0.00 0.00   SEGS  --   --  64   LYMPHOPCT  --   --  10*   EOSRELPCT  --   --  0*   MONOPCT  --   --  25*   BASOPCT  --   --  1   IMMGRAN  --   --  1   SEGSABS  --   --  8.2   LYMPHSABS  --   --  1.4   EOSABS  --   --  0.0   MONOSABS  --   --  3.2*   BASOSABS  --   --  0.1   ABSIMMGRAN  --   --  0.1      LFT Recent Labs     12/30/22  0838   BILITOT 1.8*   ALKPHOS 81   AST 29   ALT 24   PROT 6.8   LABALBU 3.4   GLOB 3.4      Cardiac  Lab Results   Component Value Date/Time    NTPROBNP 20,584 12/28/2022 04:45 PM    TROPHS 27.0 12/28/2022 04:45 PM      Coags Lab Results   Component Value Date/Time    PROTIME 17.2 12/28/2022 10:08 PM    INR 1.4 12/28/2022 10:08 PM    APTT 35.8 12/28/2022 10:08 PM      A1c No results found for: LABA1C, EAG   Lipids No results found for: CHOL, LDLCALC, LABVLDL, HDL, CHOLHDLRATIO, TRIG   Thyroid  Lab Results   Component Value Date/Time    WNU9MKD 0.649 12/28/2022 10:08 PM        Most Recent UA Lab Results   Component Value Date/Time    COLORU YELLOW/STRAW 12/28/2022 07:57 PM    APPEARANCE CLEAR 12/28/2022 07:57 PM    SPECGRAV 1.008 12/28/2022 07:57 PM    LABPH 5.5 12/28/2022 07:57 PM    PROTEINU Negative 12/28/2022 07:57 PM    GLUCOSEU Negative 12/28/2022 07:57 PM    KETUA Negative 12/28/2022 07:57 PM    BILIRUBINUR Negative 12/28/2022 07:57 PM    BLOODU Negative 12/28/2022 07:57 PM    UROBILINOGEN 0.2 12/28/2022 07:57 PM    NITRU Negative 12/28/2022 07:57 PM    LEUKOCYTESUR Negative 12/28/2022 07:57 PM        Recent Labs     12/30/22  0945   CULTURE NO GROWTH 2 DAYS       All Labs from Last 24 Hrs:  Recent Results (from the past 24 hour(s))   CBC with Auto Differential    Collection Time: 01/01/23  7:20 AM   Result Value Ref Range    WBC 13.0 (H) 4.3 - 11.1 K/uL    RBC 4.84 4.05 - 5.2 M/uL    Hemoglobin 14.4 11.7 - 15.4 g/dL    Hematocrit 45.5 35.8 - 46.3 %    MCV 94.0 82 - 102 FL    MCH 29.8 26.1 - 32.9 PG    MCHC 31.6 31.4 - 35.0 g/dL    RDW 17.4 (H) 11.9 - 14.6 %    Platelets 355 151 - 454 K/uL    MPV 11.4 9.4 - 12.3 FL    nRBC 0.00 0.0 - 0.2 K/uL    Differential Type AUTOMATED      Seg Neutrophils 64 43 - 78 %    Lymphocytes 10 (L) 13 - 44 %    Monocytes 25 (H) 4.0 - 12.0 %    Eosinophils % 0 (L) 0.5 - 7.8 %    Basophils 1 0.0 - 2.0 %    Immature Granulocytes 1 0.0 - 5.0 %    Segs Absolute 8.2 1.7 - 8.2 K/UL    Absolute Lymph # 1.4 0.5 - 4.6 K/UL    Absolute Mono # 3.2 (H) 0.1 - 1.3 K/UL    Absolute Eos # 0.0 0.0 - 0.8 K/UL    Basophils Absolute 0.1 0.0 - 0.2 K/UL    Absolute Immature Granulocyte 0.1 0.0 - 0.5 K/UL   Basic Metabolic Panel w/ Reflex to MG    Collection Time: 01/01/23  7:20 AM   Result Value Ref Range    Sodium 138 133 - 143 mmol/L    Potassium 3.9 3.5 - 5.1 mmol/L    Chloride 104 101 - 110 mmol/L    CO2 28 21 - 32 mmol/L    Anion Gap 6 2 - 11 mmol/L    Glucose 89 65 - 100 mg/dL    BUN 17 8 - 23 MG/DL    Creatinine 1.60 (H) 0.6 - 1.0 MG/DL    Est, Glom Filt Rate 32 (L) >60 ml/min/1.73m2    Calcium 8.3 8.3 - 10.4 MG/DL   Procalcitonin    Collection Time: 01/01/23  7:20 AM   Result Value Ref Range    Procalcitonin 0.57 (H) 0.00 - 0.49 ng/mL       No Known Allergies    There is no immunization history on file for this patient. Recent Vital Data:  Patient Vitals for the past 24 hrs:   Temp Pulse Resp BP SpO2   01/01/23 1149 (!) 33.6 °F (0.9 °C) 75 18 101/63 91 %   01/01/23 0708 98 °F (36.7 °C) 78 19 103/67 91 %   01/01/23 0346 99.3 °F (37.4 °C) (!) 101 19 108/68 91 %   12/31/22 2333 99.3 °F (37.4 °C) 92 18 102/79 90 %   12/31/22 2006 98.6 °F (37 °C) (!) 107 17 109/69 91 %   12/31/22 1537 99.5 °F (37.5 °C) 82 16 108/60 91 %       Oxygen Therapy  SpO2: 91 %  Pulse Oximeter Device Mode: Continuous  O2 Device: None (Room air)    Estimated body mass index is 22.31 kg/m² as calculated from the following:    Height as of this encounter: 5' 2\" (1.575 m). Weight as of this encounter: 122 lb (55.3 kg). Intake/Output Summary (Last 24 hours) at 1/1/2023 1317  Last data filed at 1/1/2023 0855  Gross per 24 hour   Intake 250 ml   Output 550 ml   Net -300 ml         Physical Exam:  General:          Alert, frail, no acute distress   Head:               Normocephalic, atraumatic  Eyes:               Sclerae appear normal.  Pupils equally round. ENT:                Nares appear normal, no drainage.   Moist oral mucosa  Neck:               No restricted ROM. Trachea midline   CV:                  RRR. No m/r/g. JVD present  Lungs:             CTAB anterior, no wheezing, no rhonchi. Respirations even and unlabored on RA. Abdomen: Bowel sounds present. Soft, nontender, nondistended. Extremities:     No cyanosis or clubbing. BLE edema +2  Skin:                No rashes and normal coloration. Warm and dry. Neuro:             CN II-XII grossly intact. Sensation intact. A&Ox3. Follows commands. Facial tremor noted. Psych:             Normal mood and affect.       Signed:  Charlee BARKERCNP-BC

## 2023-01-01 NOTE — PROGRESS NOTES
Discharge instructions reviewed with patient and family at bedside. Prescriptions sent to patient's pharmacy. CHF instructions given this am by CHF RN navigator. Patient to call for follow up appointments. Wheeled down to lobby by CNA.

## 2023-01-01 NOTE — PROGRESS NOTES
[facility] CARDIOLOGY PROGRESS NOTE           1/1/2023 9:35 AM    Admit Date: 12/28/2022      Subjective:   Patient has not active complaints. On room air. Anxious to go home. Review of telemetry shows average rate 80 bpm.  Remains in atrial fibrillation with controlled rate. Hemodynamic stable. ROS:  Cardiovascular:  As noted above    Objective:      Vitals:    12/31/22 2006 12/31/22 2333 01/01/23 0346 01/01/23 0708   BP: 109/69 102/79 108/68 103/67   Pulse: (!) 107 92 (!) 101 78   Resp: 17 18 19 19   Temp: 98.6 °F (37 °C) 99.3 °F (37.4 °C) 99.3 °F (37.4 °C) 98 °F (36.7 °C)   TempSrc:    Oral   SpO2: 91% 90% 91% 91%   Weight:       Height:           Physical Exam:  General-No Acute Distress  Neck- supple, no JVD  CV- IRIR with Grade II/VI SANTINO  Lung- decreased right base  Abd- soft, nontender, nondistended  Ext- trivial to 1+ edema bilaterally. Skin- warm and dry      Data Review:   Recent Labs     01/01/23  0720 12/31/22  0333 12/30/22  0838   WBC 13.0* 19.7* 13.5*   HGB 14.4 14.4 15.4   HCT 45.5 46.0 48.5*   MCV 94.0 95.6 94.9    494* 620*         Recent Labs     01/01/23  0720 12/31/22  0333 12/30/22  0838      < > 139   K 3.9   < > 3.9      < > 104   CO2 28   < > 30   BUN 17   < > 10   CREATININE 1.60*   < > 1.40*   GLUCOSE 89   < > 101*   CALCIUM 8.3   < > 9.2   PROT  --   --  6.8   LABALBU  --   --  3.4   BILITOT  --   --  1.8*   ALKPHOS  --   --  81   AST  --   --  29   ALT  --   --  24   LABGLOM 32*   < > 37*   GLOB  --   --  3.4    < > = values in this interval not displayed. No results for input(s): CKTOTAL, CKMB, CKMBINDEX, DDIMER, TROPONINI in the last 720 hours. Echo (12/29/22): Left Ventricle: Severely reduced left ventricular systolic function with a visually estimated EF of 15 - 20%. Left ventricle size is normal. Normal wall thickness. Severe global hypokinesis present. Abnormal diastolic function.     Right Ventricle: Reduced systolic function. Mitral Valve: Severe regurgitation. Tricuspid Valve: Severe regurgitation. The estimated RVSP is 47 mmHg. Left Atrium: Left atrium is dilated. Right Atrium: Right atrium is dilated. Assessment/Plan:     Active Hospital Problems    Atrial fibrillation (HCC)  Continue Toprol and Digoxin. Attempt rate control strategy. Patient declines consideration for SKYLER and cardioversion at the present time. Patient currently declining anticoagulation. Discussed with her and her daughter her high risk of CVA. Pleural effusion  Patient is status post thoracentesis. Monitor closely. Defer to pulmonary for further evaluation and treatmetn. *Acute systolic CHF (congestive heart failure), NYHA class 4 ([facility] [address]  I had a long discussion with patient and daughter. She has a new onset cardiomyopathy with severe LV dysfunction. Apparently she has been having CHF symptoms for the last 3 months. Discussed the possibility of a tachycardia induced cardiomyopathy. Discussed proceeding with SKYLER cardioversion and amiodarone loading as well as cardiac catheterization. Patient declines any invasive procedures. She would like to attempt a rate control anticoagulation strategy with symptomatic treatment for her heart failure. Currently her heart rate appears to be adequately controlled with Toprol and digoxin. Unable to add ACE/ARB due to low blood pressure readings. Disposition: Patient anxious for discharge. Only stable cardiac regimen. Defer to pulmonary if she needs repeat thoracentesis prior to discharge. From my standpoint she can follow-up in our office in 2 weeks.   I will send a scheduling request to her office        Bella Easton MD

## 2023-01-01 NOTE — PROGRESS NOTES
Oxygen Qualifier       Room air: SpO2 with O2 and liter flow   Resting SpO2  91%  No oxygen required   Ambulating SpO2  94%  No oxygen required       Completed by:    Moe Stover RCP

## 2023-01-01 NOTE — PROGRESS NOTES
Delicia Chang  Admission Date: 12/28/2022         Daily Progress Note: 1/1/2023    The patient's chart is reviewed and the patient is discussed with the staff. Background: Patient is a 79 y.o.  female seen and evaluated at the request of hospitalist for pleural effusion. Patient arrived to ED on 12/28 with a 2 month history of worsening bilateral leg swelling, orbital swelling and worsening of SOB. She tried to see her PCP but could not see her quickly therefore she went to [facility] who then sent her to ED. BNP at [facility] was >1900. Patient states she has not been to a doctor in 15 years. She was admitted under hospitalist service, started on IV lasix, and consulted cardiology. She has no cardiac or lung history. Other significant labs on arrival include Cr 1.3, proBNP 20,584, trop 27.0, WBC 15.2, plat 520, UA negative. She denies any cardiac history. States the only significant medical history she has is she had pleurisy when she was 8years old and spend time in a sanatorium. She lives with family and no longer drives. Granddaughter states she has been in excellent health but started noticing swelling about 2 months ago that has slowly gotten worse. Patient states she is slightly short of breath with activity but not at rest. Reports a dry cough with deep breathing. On RA. Thoracentesis performed 12/30 with 1500cc removed from right. Transudate. Subjective:     Pt seen after walking the halls on RA. States that her breathing is doing well. Reports less LE edema. Ready to go home.        Current Facility-Administered Medications   Medication Dose Route Frequency    torsemide (DEMADEX) tablet 20 mg  20 mg Oral Daily    empagliflozin (JARDIANCE) tablet 10 mg  10 mg Oral Daily    guaiFENesin (MUCINEX) extended release tablet 600 mg  600 mg Oral BID    aspirin chewable tablet 81 mg  81 mg Oral Daily    metoprolol succinate (TOPROL XL) extended release tablet 25 mg  25 mg Oral BID    digoxin (LANOXIN) tablet 125 mcg  125 mcg Oral Daily    sodium chloride flush 0.9 % injection 5-40 mL  5-40 mL IntraVENous 2 times per day    sodium chloride flush 0.9 % injection 5-40 mL  5-40 mL IntraVENous PRN    0.9 % sodium chloride infusion   IntraVENous PRN    ondansetron (ZOFRAN-ODT) disintegrating tablet 4 mg  4 mg Oral Q8H PRN    Or    ondansetron (ZOFRAN) injection 4 mg  4 mg IntraVENous Q6H PRN    polyethylene glycol (GLYCOLAX) packet 17 g  17 g Oral Daily PRN    acetaminophen (TYLENOL) tablet 650 mg  650 mg Oral Q6H PRN    Or    acetaminophen (TYLENOL) suppository 650 mg  650 mg Rectal Q6H PRN     Review of Systems: Comprehensive ROS negative except in HPI  Objective:   Blood pressure 101/63, pulse 75, temperature (!) 33.6 °F (0.9 °C), temperature source Oral, resp. rate 18, height 5' 2\" (1.575 m), weight 122 lb (55.3 kg), SpO2 91 %. Intake/Output Summary (Last 24 hours) at 1/1/2023 1240  Last data filed at 1/1/2023 0855  Gross per 24 hour   Intake 250 ml   Output 550 ml   Net -300 ml     Physical Exam:   Constitutional:  the patient is well developed and in no acute distress  EENMT:  Sclera clear, pupils equal, oral mucosa moist  Respiratory: symmetric chest rise. Decreased on right  Cardiovascular:  RRR without M,G,R. There is 1+ B lower extremity edema. Gastrointestinal: soft and non-tender; with positive bowel sounds. Musculoskeletal: warm without cyanosis. Normal muscle tone. Skin:  no jaundice or rashes, no wounds   Neurologic: symmetric strength, fluent speech  Psychiatric:  calm, appropriate, oriented x 4    Imaging: I performed an independent interpretation of the patient's images.   CXR:   1/1/23 12/31/22 12/28/22       LAB:  Recent Labs     12/30/22  0838 12/31/22  0333 01/01/23  0720   WBC 13.5* 19.7* 13.0*   HGB 15.4 14.4 14.4   HCT 48.5* 46.0 45.5   * 494* 436   PROCAL  --   --  0.57*     Recent Labs     12/30/22  0838 12/31/22  0333 01/01/23  0720    139 138   K 3.9 3.5 3.9    105 104   CO2 30 27 28   BUN 10 13 17   CREATININE 1.40* 1.40* 1.60*   MG  --  1.8  --    BILITOT 1.8*  --   --    AST 29  --   --    ALT 24  --   --    ALKPHOS 81  --   --      No results for input(s): TROPHS, NTPROBNP, CRP, ESR in the last 72 hours. Recent Labs     12/30/22  0838 12/31/22  0333 01/01/23  0720   GLUCOSE 101* 96 89      Microbiology:   Recent Labs     12/30/22  0945   CULTURE NO GROWTH 2 DAYS     ECHO: 12/28/22    TRANSTHORACIC ECHOCARDIOGRAM (TTE) COMPLETE (CONTRAST/BUBBLE/3D PRN) 12/29/2022  4:05 PM (Final)    Interpretation Summary    Left Ventricle: Severely reduced left ventricular systolic function with a visually estimated EF of 15 - 20%. Left ventricle size is normal. Normal wall thickness. Severe global hypokinesis present. Abnormal diastolic function. Right Ventricle: Reduced systolic function. Mitral Valve: Severe regurgitation. Tricuspid Valve: Severe regurgitation. The estimated RVSP is 47 mmHg. Left Atrium: Left atrium is dilated. Right Atrium: Right atrium is dilated. Technical qualifiers: Procedure performed with the patient in a supine position, color flow Doppler was performed and pulse wave and/or continuous wave Doppler was performed. Contrast used: Definity. Signed by: Nicolle Mcgowan MD on 12/29/2022  4:05 PM    Assessment and Plan:  (Medical Decision Making)   Principal Problem:    Acute systolic CHF (congestive heart failure), NYHA class 4 ([address]  Plan: EF 15-20%. Cardiology following. IV torsemide changed to oral. She needs ongoing diuresis even after discharge. Active Problems:    Atrial fibrillation ([address]  Plan: per cardiology. On digoxin and metoprolol    Pleural effusion  Plan: S/p thora 12/30 with 1.5L removed. CXR shows some ongoing effusion. Discussed with her and family need to monitor this and likely will need repeat thoracentesis.  Will send message to pulmonary office to set up follow up hopefully in 1 week with another CXR. Leukocytosis: pct mildly elevated. Afebrile and no complaints to suggest pna. Could send home with a course of doxy if there is concern for infection. More than 50% of the time documented was spent in face-to-face contact with the patient and in the care of the patient on the floor/unit where the patient is located.     Tosha Peña MD

## 2023-01-01 NOTE — PROGRESS NOTES
[facility] [address], [place], [place]  Authorization for Surgical Operation and Procedure                                                                                           I hereby authorize Amy Means MD, my physician and his/her assistants (if applicable), which may include medical students, residents, and/or fellows, to perform the following surgical operation/ procedure and administer such anesthesia as may be determined necessary by my physician: Operation/Procedure name (s) COLONOSCOPY / ESOPHAGOGASTRODUODENOSCOPY on Ran Luis M Carter   2.   I recognize that during the surgical operation/procedure, unforeseen conditions may necessitate additional or different procedures than those listed above.  I, therefore, further authorize and request that the above-named surgeon, assistants, or designees perform such procedures as are, in their judgment, necessary and desirable.    3.   My surgeon/physician has discussed prior to my surgery the potential benefits, risks and side effects of this procedure; the likelihood of achieving goals; and potential problems that might occur during recuperation.  They also discussed reasonable alternatives to the procedure, including risks, benefits, and side effects related to the alternatives and risks related to not receiving this procedure.  I have had all my questions answered and I acknowledge that no guarantee has been made as to the result that may be obtained.    4.   Should the need arise during my operation/procedure, which includes change of level of care prior to discharge, I also consent to the administration of blood and/or blood products.  Further, I understand that despite careful testing and screening of blood or blood products by collecting agencies, I may still be subject to ill effects as a result of receiving a blood transfusion and/or blood products.  The following are some, but not all, of the potential risks that  Report given to oncoming RN.   Informed that the pt is to be on strict I/O can occur: fever and allergic reactions, hemolytic reactions, transmission of diseases such as Hepatitis, AIDS and Cytomegalovirus (CMV) and fluid overload.  In the event that I wish to have an autologous transfusion of my own blood, or a directed donor transfusion, I will discuss this with my physician.  Check only if Refusing Blood or Blood Products  I understand refusal of blood or blood products as deemed necessary by my physician may have serious consequences to my condition to include possible death. I hereby assume responsibility for my refusal and release the hospital, its personnel, and my physicians from any responsibility for the consequences of my refusal.    o  Refuse   5.   I authorize the use of any specimen, organs, tissues, body parts or foreign objects that may be removed from my body during the operation/procedure for diagnosis, research or teaching purposes and their subsequent disposal by hospital authorities.  I also authorize the release of specimen test results and/or written reports to my treating physician on the hospital medical staff or other referring or consulting physicians involved in my care, at the discretion of the Pathologist or my treating physician.    6.   I consent to the photographing or videotaping of the operations or procedures to be performed, including appropriate portions of my body for medical, scientific, or educational purposes, provided my identity is not revealed by the pictures or by descriptive texts accompanying them.  If the procedure has been photographed/videotaped, the surgeon will obtain the original picture, image, videotape or CD.  The hospital will not be responsible for storage, release or maintenance of the picture, image, tape or CD.    7.   I consent to the presence of a  or observers in the operating room as deemed necessary by my physician or their designees.    8.   I recognize that in the event my procedure results in extended  X-Ray/fluoroscopy time, I may develop a skin reaction.    9. If I have a Do Not Attempt Resuscitation (DNAR) order in place, that status will be suspended while in the operating room, procedural suite, and during the recovery period unless otherwise explicitly stated by me (or a person authorized to consent on my behalf). The surgeon or my attending physician will determine when the applicable recovery period ends for purposes of reinstating the DNAR order.  10. Patients having a sterilization procedure: I understand that if the procedure is successful the results will be permanent and it will therefore be impossible for me to inseminate, conceive, or bear children.  I also understand that the procedure is intended to result in sterility, although the result has not been guaranteed.   11. I acknowledge that my physician has explained sedation/analgesia administration to me including the risk and benefits I consent to the administration of sedation/analgesia as may be necessary or desirable in the judgment of my physician.    I CERTIFY THAT I HAVE READ AND FULLY UNDERSTAND THE ABOVE CONSENT TO OPERATION and/or OTHER PROCEDURE.     _________________________________________ _________________________________     ___________________________________  Signature of Patient     Signature of Responsible Person                   Printed Name of Responsible Person                              _________________________________________ ______________________________        ___________________________________  Signature of Witness         Date  Time         Relationship to Patient    STATEMENT OF PHYSICIAN My signature below affirms that prior to the time of the procedure; I have explained to the patient and/or his/her legal representative, the risks and benefits involved in the proposed treatment and any reasonable alternative to the proposed treatment. I have also explained the risks and benefits involved in refusal of the  proposed treatment and alternatives to the proposed treatment and have answered the patient's questions. If I have a significant financial interest in a co-management agreement or a significant financial interest in any product or implant, or other significant relationship used in this procedure/surgery, I have disclosed this and had a discussion with my patient.     _______________________________________________________________ _____________________________  (Signature of Physician)                                                                                         (Date)                                   (Time)  Patient Name: Ran Carter    : 1971   Printed: 2023      Medical Record #: [id number]                                              Page 1 of 1

## 2023-01-02 ENCOUNTER — HOME CARE VISIT (OUTPATIENT)
Dept: SCHEDULING | Facility: HOME HEALTH | Age: 83
End: 2023-01-02
Payer: MEDICARE

## 2023-01-02 PROCEDURE — G0299 HHS/HOSPICE OF RN EA 15 MIN: HCPCS

## 2023-01-03 ENCOUNTER — HOME CARE VISIT (OUTPATIENT)
Dept: SCHEDULING | Facility: HOME HEALTH | Age: 83
End: 2023-01-03
Payer: MEDICARE

## 2023-01-03 ENCOUNTER — TELEPHONE (OUTPATIENT)
Dept: HOME HEALTH SERVICES | Facility: HOME HEALTH | Age: 83
End: 2023-01-03

## 2023-01-03 VITALS
DIASTOLIC BLOOD PRESSURE: 58 MMHG | HEART RATE: 74 BPM | RESPIRATION RATE: 16 BRPM | SYSTOLIC BLOOD PRESSURE: 110 MMHG | TEMPERATURE: 99.2 F | OXYGEN SATURATION: 93 %

## 2023-01-03 LAB
FUNGAL CULT/SMEAR: NORMAL
SPECIMEN PROCESSING: NORMAL
SPECIMEN SOURCE: NORMAL

## 2023-01-03 PROCEDURE — G0151 HHCP-SERV OF PT,EA 15 MIN: HCPCS

## 2023-01-03 ASSESSMENT — ENCOUNTER SYMPTOMS
STOOL DESCRIPTION: SOFT FORMED
DYSPNEA ACTIVITY LEVEL: AFTER AMBULATING 10 - 20 FT

## 2023-01-03 NOTE — TELEPHONE ENCOUNTER
[address] [place] [address] Sebastian Benitez Pharmacist consult. Mrs. Nayeli Marr was discharged from [facility] with CHF. I spoke with her daughter Cathleen and explained my part of the [facility] team.  I asked to review her mother's discharge medications, but she declined. She said she had read all about them and felt she was good with them. She is using a daily planner to be sure the doses are correct. She said her mother had an MD appointment with cardiology on Thursday. I gave her my cell phone number and asked to be called with any medication questions or issues. She said OK to call back any time.

## 2023-01-04 ENCOUNTER — HOME CARE VISIT (OUTPATIENT)
Dept: SCHEDULING | Facility: HOME HEALTH | Age: 83
End: 2023-01-04
Payer: MEDICARE

## 2023-01-04 VITALS
BODY MASS INDEX: 20.49 KG/M2 | TEMPERATURE: 97.2 F | SYSTOLIC BLOOD PRESSURE: 98 MMHG | WEIGHT: 112 LBS | HEART RATE: 67 BPM | DIASTOLIC BLOOD PRESSURE: 60 MMHG | OXYGEN SATURATION: 95 % | RESPIRATION RATE: 16 BRPM

## 2023-01-04 DIAGNOSIS — J90 PLEURAL EFFUSION: ICD-10-CM

## 2023-01-04 DIAGNOSIS — I50.21 ACUTE SYSTOLIC CHF (CONGESTIVE HEART FAILURE), NYHA CLASS 4 (HCC): Primary | ICD-10-CM

## 2023-01-04 PROBLEM — I50.22 CHRONIC SYSTOLIC CONGESTIVE HEART FAILURE (HCC): Status: ACTIVE | Noted: 2022-12-28

## 2023-01-04 PROCEDURE — G0299 HHS/HOSPICE OF RN EA 15 MIN: HCPCS

## 2023-01-04 ASSESSMENT — ENCOUNTER SYMPTOMS
SHORTNESS OF BREATH: 0
HEMOPTYSIS: 0
DYSPNEA ACTIVITY LEVEL: AFTER AMBULATING MORE THAN 20 FT

## 2023-01-05 ENCOUNTER — HOME CARE VISIT (OUTPATIENT)
Dept: SCHEDULING | Facility: HOME HEALTH | Age: 83
End: 2023-01-05
Payer: MEDICARE

## 2023-01-05 ENCOUNTER — OFFICE VISIT (OUTPATIENT)
Dept: CARDIOLOGY CLINIC | Age: 83
End: 2023-01-05
Payer: MEDICARE

## 2023-01-05 VITALS
OXYGEN SATURATION: 94 % | BODY MASS INDEX: 20.87 KG/M2 | DIASTOLIC BLOOD PRESSURE: 60 MMHG | WEIGHT: 113.4 LBS | HEART RATE: 58 BPM | SYSTOLIC BLOOD PRESSURE: 118 MMHG | HEIGHT: 62 IN

## 2023-01-05 VITALS
OXYGEN SATURATION: 95 % | DIASTOLIC BLOOD PRESSURE: 78 MMHG | HEART RATE: 66 BPM | SYSTOLIC BLOOD PRESSURE: 128 MMHG | TEMPERATURE: 97.8 F | RESPIRATION RATE: 18 BRPM

## 2023-01-05 VITALS
TEMPERATURE: 98 F | DIASTOLIC BLOOD PRESSURE: 62 MMHG | RESPIRATION RATE: 17 BRPM | HEART RATE: 64 BPM | SYSTOLIC BLOOD PRESSURE: 110 MMHG | OXYGEN SATURATION: 96 %

## 2023-01-05 DIAGNOSIS — I50.22 CHRONIC SYSTOLIC CONGESTIVE HEART FAILURE (HCC): ICD-10-CM

## 2023-01-05 DIAGNOSIS — I48.19 PERSISTENT ATRIAL FIBRILLATION (HCC): ICD-10-CM

## 2023-01-05 DIAGNOSIS — J90 PLEURAL EFFUSION: Primary | ICD-10-CM

## 2023-01-05 LAB
ANION GAP SERPL CALC-SCNC: 6 MMOL/L (ref 2–11)
BUN SERPL-MCNC: 14 MG/DL (ref 8–23)
CALCIUM SERPL-MCNC: 8.9 MG/DL (ref 8.3–10.4)
CHLORIDE SERPL-SCNC: 100 MMOL/L (ref 101–110)
CO2 SERPL-SCNC: 33 MMOL/L (ref 21–32)
CREAT SERPL-MCNC: 1.3 MG/DL (ref 0.6–1)
GLUCOSE SERPL-MCNC: 91 MG/DL (ref 65–100)
POTASSIUM SERPL-SCNC: 4.8 MMOL/L (ref 3.5–5.1)
SODIUM SERPL-SCNC: 139 MMOL/L (ref 133–143)

## 2023-01-05 PROCEDURE — 1123F ACP DISCUSS/DSCN MKR DOCD: CPT | Performed by: INTERNAL MEDICINE

## 2023-01-05 PROCEDURE — 1111F DSCHRG MED/CURRENT MED MERGE: CPT | Performed by: INTERNAL MEDICINE

## 2023-01-05 PROCEDURE — G8484 FLU IMMUNIZE NO ADMIN: HCPCS | Performed by: INTERNAL MEDICINE

## 2023-01-05 PROCEDURE — 1090F PRES/ABSN URINE INCON ASSESS: CPT | Performed by: INTERNAL MEDICINE

## 2023-01-05 PROCEDURE — G8400 PT W/DXA NO RESULTS DOC: HCPCS | Performed by: INTERNAL MEDICINE

## 2023-01-05 PROCEDURE — G8420 CALC BMI NORM PARAMETERS: HCPCS | Performed by: INTERNAL MEDICINE

## 2023-01-05 PROCEDURE — G0157 HHC PT ASSISTANT EA 15: HCPCS

## 2023-01-05 PROCEDURE — 1036F TOBACCO NON-USER: CPT | Performed by: INTERNAL MEDICINE

## 2023-01-05 PROCEDURE — 99214 OFFICE O/P EST MOD 30 MIN: CPT | Performed by: INTERNAL MEDICINE

## 2023-01-05 PROCEDURE — G8427 DOCREV CUR MEDS BY ELIG CLIN: HCPCS | Performed by: INTERNAL MEDICINE

## 2023-01-05 NOTE — PROGRESS NOTES
[address], [address]  [place] [address]  PHONE: [phone]    Kati Brandon  1940      SUBJECTIVE:   Kati Brandon is a 82 y.o. female seen for a follow up visit regarding the following:     Chief Complaint   Patient presents with    Follow-Up from Hospital       HPI:    Patient is a 82-year-old female who was recently hospitalized with acute systolic heart failure.  She presented with worsening lower extremity edema, dyspnea and orthopnea. Had not seen MD in over 10 years.   Her proBNP was markedly elevated over 20,000.  She had a large primary pleural effusion.  She was found to be in atrial fibrillation with a mildly tachycardic rate.  She underwent diuresis and right thoracentesis.  Her heart rate was controlled with Toprol and digoxin.  We discussed with her proceeding with cardioversion and cardiac catheterization but she declined all invasive therapies.  She also declined and I quite patient.  I did discuss with her during her hospitalization her increased risk of CVA.    She is feeling well since her discharge.  Her dyspnea is improved.  Her edema has largely resolved.  Her heart rate appears to be well controlled on today's exam.  She has not had lab work since her discharge.  She has an appointment with pulmonary for evaluation of pleural effusion tomorrow      Past Medical History, Past Surgical History, Family history, Social History, and Medications were all reviewed with the patient today and updated as necessary.           Current Outpatient Medications:     aspirin 81 MG chewable tablet, Take 1 tablet by mouth daily, Disp: 30 tablet, Rfl: 0    empagliflozin (JARDIANCE) 10 MG tablet, Take 1 tablet by mouth daily, Disp: 30 tablet, Rfl: 0    metoprolol succinate (TOPROL XL) 25 MG extended release tablet, Take 1 tablet by mouth in the morning and at bedtime, Disp: 60 tablet, Rfl: 0    digoxin (LANOXIN) 125 MCG tablet, Take 1 tablet by mouth daily, Disp: 30  tablet, Rfl: 0    torsemide (DEMADEX) 20 MG tablet, Take 1 tablet by mouth daily, Disp: 30 tablet, Rfl: 0    guaiFENesin (MUCINEX) 600 MG extended release tablet, Take 1 tablet by mouth 2 times daily for 5 days (Patient taking differently: Take 1,200 mg by mouth 2 times daily), Disp: 10 tablet, Rfl: 0     No Known Allergies     Patient Active Problem List    Diagnosis Date Noted    Persistent atrial fibrillation ([facility] 12/28/2022     Priority: Medium    Pleural effusion 12/28/2022     Priority: Medium    Chronic systolic congestive heart failure ([facility] 12/28/2022     Priority: Medium     Echo (12/29/22):  EF 15-20%. Severe GHK. RV dysfunction. Severe TR/MR. RVSP 47. Social History     Tobacco Use    Smoking status: Never     Passive exposure: Never    Smokeless tobacco: Never   Substance Use Topics    Alcohol use: Not Currently       ROS:    Review of Systems   Constitutional: Positive for malaise/fatigue. Cardiovascular:  Positive for dyspnea on exertion. Negative for chest pain and irregular heartbeat. Respiratory:  Negative for shortness of breath. Musculoskeletal:  Positive for arthritis. Neurological:  Negative for focal weakness. Psychiatric/Behavioral:  Negative for depression. PHYSICAL EXAM:  Wt Readings from Last 3 Encounters:   01/05/23 113 lb 6.4 oz (51.4 kg)   01/04/23 112 lb (50.8 kg)   01/01/23 122 lb (55.3 kg)     BP Readings from Last 3 Encounters:   01/05/23 118/60   01/04/23 98/60   01/03/23 128/78     Pulse Readings from Last 3 Encounters:   01/05/23 58   01/04/23 67   01/03/23 66       Physical Exam  Constitutional:       General: She is not in acute distress. Appearance: Normal appearance. Neck:      Vascular: No carotid bruit. Cardiovascular:      Rate and Rhythm: Normal rate. Rhythm irregular. Pulmonary:      Breath sounds: No wheezing. Comments: Decreased breath sounds in right base   Abdominal:      General: There is no distension. Palpations: Abdomen is soft. Musculoskeletal:         General: Swelling (triival) present. Skin:     General: Skin is warm and dry. Neurological:      General: No focal deficit present. Psychiatric:         Mood and Affect: Mood normal.       Medical problems and test results were reviewed with the patient today. Lab Results   Component Value Date    WBC 13.0 (H) 01/01/2023    HGB 14.4 01/01/2023    HCT 45.5 01/01/2023    MCV 94.0 01/01/2023     01/01/2023       Lab Results   Component Value Date/Time     01/01/2023 07:20 AM    K 3.9 01/01/2023 07:20 AM     01/01/2023 07:20 AM    CO2 28 01/01/2023 07:20 AM    BUN 17 01/01/2023 07:20 AM    CREATININE 1.60 01/01/2023 07:20 AM    GLUCOSE 89 01/01/2023 07:20 AM    CALCIUM 8.3 01/01/2023 07:20 AM        No results found for: CHOL  No results found for: TRIG  No results found for: HDL  No results found for: LDLCHOLESTEROL, LDLCALC  No results found for: LABVLDL, VLDL  No results found for: CHOLHDLRATIO     Data from outside records/labs from outside providers have been reviewed and summarized as noted in the HPI, past history and data review sections of this note       ASSESSMENT and PLAN      1. Chronic systolic congestive heart failure (HCC)  Severe LV dysfunction. 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 Continue rate control of atrial fibrillation. Continue current medications including Demadex. Unable to tolerate ACE/ARB/Entresto/Aldactone due to renal insufficiency. Check labs today. - Basic Metabolic Panel; Future    2. Pleural effusion  Has appointment with pulmonary for evaluation    3. Persistent atrial fibrillation (HCC)  Rate appears controlled. Declines anticoagulation. Understands significant risk of CVA             Return in about 2 weeks (around 1/19/2023). Mariella Leiva MD  1/5/2023  9:44 AM    This note may have been dictated using speech recognition software.   As a result, error of speech recognition may have occurred

## 2023-01-06 ENCOUNTER — TELEPHONE (OUTPATIENT)
Dept: CARDIOLOGY CLINIC | Age: 83
End: 2023-01-06

## 2023-01-06 ENCOUNTER — PREP FOR PROCEDURE (OUTPATIENT)
Dept: PULMONOLOGY | Age: 83
End: 2023-01-06

## 2023-01-06 ENCOUNTER — OFFICE VISIT (OUTPATIENT)
Dept: PULMONOLOGY | Age: 83
End: 2023-01-06
Payer: MEDICARE

## 2023-01-06 ENCOUNTER — HOSPITAL ENCOUNTER (OUTPATIENT)
Dept: GENERAL RADIOLOGY | Age: 83
Discharge: HOME | End: 2023-01-06
Payer: MEDICARE

## 2023-01-06 ENCOUNTER — HOME CARE VISIT (OUTPATIENT)
Dept: HOME HEALTH SERVICES | Facility: HOME HEALTH | Age: 83
End: 2023-01-06
Payer: MEDICARE

## 2023-01-06 VITALS
RESPIRATION RATE: 18 BRPM | HEART RATE: 72 BPM | DIASTOLIC BLOOD PRESSURE: 80 MMHG | SYSTOLIC BLOOD PRESSURE: 142 MMHG | BODY MASS INDEX: 20.06 KG/M2 | OXYGEN SATURATION: 95 % | WEIGHT: 109 LBS | HEIGHT: 62 IN | TEMPERATURE: 98.1 F

## 2023-01-06 VITALS
WEIGHT: 109.6 LBS | BODY MASS INDEX: 20.17 KG/M2 | OXYGEN SATURATION: 93 % | RESPIRATION RATE: 14 BRPM | HEART RATE: 57 BPM | HEIGHT: 62 IN | SYSTOLIC BLOOD PRESSURE: 104 MMHG | DIASTOLIC BLOOD PRESSURE: 62 MMHG | TEMPERATURE: 96.5 F

## 2023-01-06 DIAGNOSIS — I50.23 ACUTE ON CHRONIC SYSTOLIC HEART FAILURE (HCC): ICD-10-CM

## 2023-01-06 DIAGNOSIS — J90 PLEURAL EFFUSION: ICD-10-CM

## 2023-01-06 DIAGNOSIS — I50.21 ACUTE SYSTOLIC CHF (CONGESTIVE HEART FAILURE), NYHA CLASS 4 (HCC): ICD-10-CM

## 2023-01-06 DIAGNOSIS — J90 RECURRENT RIGHT PLEURAL EFFUSION: Primary | ICD-10-CM

## 2023-01-06 PROCEDURE — 1036F TOBACCO NON-USER: CPT | Performed by: INTERNAL MEDICINE

## 2023-01-06 PROCEDURE — 7100000010 HC PHASE II RECOVERY - FIRST 15 MIN: Performed by: INTERNAL MEDICINE

## 2023-01-06 PROCEDURE — G8484 FLU IMMUNIZE NO ADMIN: HCPCS | Performed by: INTERNAL MEDICINE

## 2023-01-06 PROCEDURE — 1111F DSCHRG MED/CURRENT MED MERGE: CPT | Performed by: INTERNAL MEDICINE

## 2023-01-06 PROCEDURE — 7100000011 HC PHASE II RECOVERY - ADDTL 15 MIN: Performed by: INTERNAL MEDICINE

## 2023-01-06 PROCEDURE — 32555 ASPIRATE PLEURA W/ IMAGING: CPT | Performed by: INTERNAL MEDICINE

## 2023-01-06 PROCEDURE — 71046 X-RAY EXAM CHEST 2 VIEWS: CPT

## 2023-01-06 PROCEDURE — 99214 OFFICE O/P EST MOD 30 MIN: CPT | Performed by: INTERNAL MEDICINE

## 2023-01-06 PROCEDURE — 2709999900 HC NON-CHARGEABLE SUPPLY: Performed by: INTERNAL MEDICINE

## 2023-01-06 PROCEDURE — G8420 CALC BMI NORM PARAMETERS: HCPCS | Performed by: INTERNAL MEDICINE

## 2023-01-06 PROCEDURE — 1090F PRES/ABSN URINE INCON ASSESS: CPT | Performed by: INTERNAL MEDICINE

## 2023-01-06 PROCEDURE — C1729 CATH, DRAINAGE: HCPCS | Performed by: INTERNAL MEDICINE

## 2023-01-06 PROCEDURE — 1123F ACP DISCUSS/DSCN MKR DOCD: CPT | Performed by: INTERNAL MEDICINE

## 2023-01-06 PROCEDURE — G8400 PT W/DXA NO RESULTS DOC: HCPCS | Performed by: INTERNAL MEDICINE

## 2023-01-06 PROCEDURE — 3609027000 HC THORACENTESIS W/ULTRASOUND: Performed by: INTERNAL MEDICINE

## 2023-01-06 PROCEDURE — G8427 DOCREV CUR MEDS BY ELIG CLIN: HCPCS | Performed by: INTERNAL MEDICINE

## 2023-01-06 RX ORDER — SODIUM CHLORIDE 0.9 % (FLUSH) 0.9 %
5-40 SYRINGE (ML) INJECTION PRN
Status: CANCELLED | OUTPATIENT
Start: 2023-01-06

## 2023-01-06 RX ORDER — SODIUM CHLORIDE 9 MG/ML
INJECTION, SOLUTION INTRAVENOUS PRN
Status: CANCELLED | OUTPATIENT
Start: 2023-01-06

## 2023-01-06 RX ORDER — SODIUM CHLORIDE 0.9 % (FLUSH) 0.9 %
5-40 SYRINGE (ML) INJECTION EVERY 12 HOURS SCHEDULED
Status: CANCELLED | OUTPATIENT
Start: 2023-01-06

## 2023-01-06 ASSESSMENT — PAIN - FUNCTIONAL ASSESSMENT
PAIN_FUNCTIONAL_ASSESSMENT: 0-10
PAIN_FUNCTIONAL_ASSESSMENT: NONE - DENIES PAIN

## 2023-01-06 NOTE — PROCEDURES
Toi Jamison is a 79 y.o. female patient. 1. Acute systolic CHF (congestive heart failure), NYHA class 4 ([facility]    2. Pleural effusion      No past medical history on file. Blood pressure (!) 122/59, pulse 66, temperature 98.1 °F (36.7 °C), resp. rate 18, height 5' 2\" (1.575 m), weight 109 lb (49.4 kg), SpO2 94 %. Thoracentesis    Date/Time: 1/6/2023 1:16 PM  Performed by: Kasandra Mosquera MD  Authorized by: Kasandra Mosquera MD   Consent: Verbal consent obtained. Consent given by: patient  Patient understanding: patient states understanding of the procedure being performed  Patient consent: the patient's understanding of the procedure matches consent given  Procedure consent: procedure consent matches procedure scheduled  Relevant documents: relevant documents present and verified  Test results: test results available and properly labeled  Site marked: the operative site was marked  Imaging studies: imaging studies available  Patient identity confirmed: verbally with patient  Time out: Immediately prior to procedure a \"time out\" was called to verify the correct patient, procedure, equipment, support staff and site/side marked as required. Procedure purpose: therapeutic  Indications: pleural effusion  Preparation: Patient was prepped and draped in the usual sterile fashion.   Local anesthesia used: yes  Anesthesia: see MAR for details and local infiltration    Anesthesia:  Local anesthesia used: yes    Sedation:  Patient sedated: no    Preparation: skin prepped with Betadine  Patient position: sitting  Ultrasound guidance: yes  Location: right posterior  Intercostal space: 8th  Puncture method: needle only  Number of attempts: 1  Drainage amount: 1250 ml  Drainage characteristics: clear  Patient tolerance: patient tolerated the procedure well with no immediate complications  Chest x-ray performed: no        Kasandra Mosquera MD  1/6/2023

## 2023-01-06 NOTE — TELEPHONE ENCOUNTER
Patient called with lab results voiced understanding//markab    ----- Message from Jayesh Campbell MD sent at 1/5/2023  8:21 PM EST -----  Please call patient. Labs are reviewed and are acceptable. Continue current medications. Call with any questions, concerns or new/worsening cardiac symptoms.

## 2023-01-06 NOTE — PROGRESS NOTES
Patient Name:  Rossi Segal                               YOB: 1940  MRN: [id number]                                                Office Visit 1/8/2023    ASSESSMENT AND PLAN:  (Medical Decision Making)  Recurrent right pleural effusion which has been presumed to be due to heart failure persists despite medical therapy. We will perform thoracentesis today. We discussed the options of serial thoracentesis, pleurodesis (surgical and chemical) and Pleurx catheter if the effusion continues to recur. Based on her chest x-ray today I think we should also obtain a chest CT on a nonurgent basis to confirm no underlying lung condition that would cause effusion. 1. Recurrent right pleural effusion  As above we will perform serial thoracentesis for now and discuss further options in the future. I will arrange a nonurgent CT of the chest.    2. Acute on chronic systolic heart failure ([address]  Continue medical therapy. Her therapy is not completely optimal due to her chronic kidney insufficiency but overall I think her edema is becoming better controlled & I'm hopeful control of effusion will be possible medically. Skylar Zamora MD    Total time for encounter on day of encounter was 33 minutes. This time includes chart prep, review of tests/procedures, review of other provider's notes, documentation and counseling patient regarding disease process and medications.    ___________________________________________________________________         ______      REASON FOR VISIT:   Chief Complaint   Patient presents with    Follow-Up from [address] [place] [address]:    Ms. Hyacinth Hernandez is a 79 y.o. female with a PMH of systolic heart failure with ejection fraction of 15 to 20% and abnormal diastolic function who is seen at [facility] today after recent hospitalization for acute systolic heart failure during which her BNP was over 20,000 and she had extremity edema to the thighs. She was in atrial fibrillation. She declined invasive interventions like cardiac cath or cardioversion. She declined oral anticoagulation, although she takes aspirin. During her hospitalization she was diuresed and had a thoracentesis on 12/30/22 with 1500 mL of transudative fluid removed from the right side. Cytology was negative & cell count was low with only 67 white blood cells. All cultures were negative as well. Glucose was normal.    Since her discharge, she has been maintained on digoxin 0.125 mg daily, torsemide 20 mg daily, metoprolol 25 mg extended release twice daily. Her weight is 113 compared with 122 when she was in the hospital.  She still has ankle edema which extends about MCFP up her calf but it is dramatically improved according to her granddaughter who was present at visit today. Chest x-ray today demonstrates reaccumulation of the right-sided pleural effusion and she is being arranged for thoracentesis today. Tobacco Use      Smoking status: Never      Smokeless tobacco: Never  [facility]  TB: history of time in [facility]    REVIEW OF SYSTEMS:   10 point review of systems is negative except as reported in HPI. PHYSICAL EXAM:   Vitals:    01/06/23 0933   BP: 104/62   Pulse: 57   Resp: 14   Temp: (!) 96.5 °F (35.8 °C)   SpO2: 93%   Weight: 109 lb 9.6 oz (49.7 kg)   Height: 5' 2\" (1.575 m)     Body mass index is 20.05 kg/m². General:   Alert, cooperative, no distress, appears stated age. Eyes/Ears/Nose:   Conjunctivae/corneas clear. PERRL. Nasal mucosa is normal.  Normal TMs and external auditory canals. Mouth/Throat:  Lips, mucosa, and tongue normal. Teeth and gums normal.        Lungs:     decreased on R     Heart:   irreg rateand rhythm, S1, S2 normal, no murmur, click, rub or gallop. Abdomen:    Soft, non-tender. Extremities:  Extremities normal, atraumatic, no cyanosis or edema.      Skin:  Skin color normal. No rashes or lesions Neurologic:  A&Ox3     No results found for this or any previous visit. No results found for this or any previous visit. LABS:   Lab Results   Component Value Date/Time    WBC 13.0 01/01/2023 07:20 AM    HGB 14.4 01/01/2023 07:20 AM    HCT 45.5 01/01/2023 07:20 AM     01/01/2023 07:20 AM    NTPROBNP 20,584 12/28/2022 04:45 PM     Imaging: I performed an independent interpretation of the patient's images. CXR:     XR CHEST STANDARD TWO VW 01/06/2023    Narrative  History: Acute systolic (congestive) heart failure; Pleural effusion, not  elsewhere classified    Two views chest    COMPARISON: 1/1/2023    Findings: There is a right pleural effusion with right basilar airspace  consolidation, similar to the findings seen previously, but the edema-like  pattern to the lungs has decreased when compared with the prior study. The left  lung is clear. The mediastinal contour and osseous structures are normal.    Impression  Improved edema-like pattern to the lungs. Persistent right pleural  effusion and dense airspace consolidation at the right lung base. CT Chest: No results found for this or any previous visit from the past 3650 days. Nuclear Medicine: No results found for this or any previous visit from the past 3650 days. 12/28/22    TRANSTHORACIC ECHOCARDIOGRAM (TTE) COMPLETE (CONTRAST/BUBBLE/3D PRN) 12/29/2022  4:05 PM (Final)    Interpretation Summary    Left Ventricle: Severely reduced left ventricular systolic function with a visually estimated EF of 15 - 20%. Left ventricle size is normal. Normal wall thickness. Severe global hypokinesis present. Abnormal diastolic function. Right Ventricle: Reduced systolic function. Mitral Valve: Severe regurgitation. Tricuspid Valve: Severe regurgitation. The estimated RVSP is 47 mmHg. Left Atrium: Left atrium is dilated. Right Atrium: Right atrium is dilated.     Technical qualifiers: Procedure performed with the patient in a supine position, color flow Doppler was performed and pulse wave and/or continuous wave Doppler was performed. Contrast used: Definity. Signed by: Alexandria Glasgow MD on 12/29/2022  4:05 PM       REFERENCE INFO:                                                                                                                            History reviewed. No pertinent past medical history.   No Known Allergies  Current Outpatient Medications   Medication Instructions    aspirin 81 mg, Oral, DAILY    digoxin (LANOXIN) 125 mcg, Oral, DAILY    empagliflozin (JARDIANCE) 10 mg, Oral, DAILY    metoprolol succinate (TOPROL XL) 25 mg, Oral, 2 times daily    torsemide (DEMADEX) 20 mg, Oral, DAILY

## 2023-01-06 NOTE — PROGRESS NOTES
Pt sat up on side of bed for thoracentesis. Consent obtained. Time out performed. Pts vitals monitored throughout procedure. Right ultrasound done and pic taken of pleural fluid. ~1300 ml yellow pleural fluid from right  Pt tolerated procedure well with no adverse rxn. No Specimens sent to the lab. Site dressed appropriately. Lung sliding done and ultrasound findings reviewed by MD.     Discharge instructions reviewed with pt and family then pt discharged into family's care via wheelchair.

## 2023-01-07 LAB
FUNGUS SMEAR: NORMAL
SPECIMEN SOURCE: NORMAL

## 2023-01-09 ENCOUNTER — HOME CARE VISIT (OUTPATIENT)
Dept: SCHEDULING | Facility: HOME HEALTH | Age: 83
End: 2023-01-09
Payer: MEDICARE

## 2023-01-09 ENCOUNTER — OFFICE VISIT (OUTPATIENT)
Dept: FAMILY MEDICINE CLINIC | Facility: CLINIC | Age: 83
End: 2023-01-09
Payer: MEDICARE

## 2023-01-09 VITALS
SYSTOLIC BLOOD PRESSURE: 108 MMHG | RESPIRATION RATE: 17 BRPM | TEMPERATURE: 97.7 F | DIASTOLIC BLOOD PRESSURE: 64 MMHG | HEART RATE: 66 BPM | OXYGEN SATURATION: 96 %

## 2023-01-09 VITALS
BODY MASS INDEX: 19.14 KG/M2 | OXYGEN SATURATION: 96 % | SYSTOLIC BLOOD PRESSURE: 100 MMHG | WEIGHT: 104 LBS | DIASTOLIC BLOOD PRESSURE: 58 MMHG | HEIGHT: 62 IN | HEART RATE: 79 BPM

## 2023-01-09 DIAGNOSIS — G25.0 BENIGN HEAD TREMOR: ICD-10-CM

## 2023-01-09 DIAGNOSIS — Z79.899 HIGH RISK MEDICATION USE: ICD-10-CM

## 2023-01-09 DIAGNOSIS — Z28.21 REFUSED INFLUENZA VACCINE: ICD-10-CM

## 2023-01-09 DIAGNOSIS — R06.81 WITNESSED EPISODE OF APNEA: ICD-10-CM

## 2023-01-09 DIAGNOSIS — Z28.21 COVID-19 VACCINATION REFUSED: ICD-10-CM

## 2023-01-09 DIAGNOSIS — I87.2 EDEMA OF BOTH LOWER EXTREMITIES DUE TO PERIPHERAL VENOUS INSUFFICIENCY: ICD-10-CM

## 2023-01-09 DIAGNOSIS — Z28.21 REFUSED PNEUMOCOCCAL VACCINE: ICD-10-CM

## 2023-01-09 DIAGNOSIS — N18.32 STAGE 3B CHRONIC KIDNEY DISEASE (HCC): ICD-10-CM

## 2023-01-09 DIAGNOSIS — Z86.79 HISTORY OF ATRIAL FIBRILLATION: ICD-10-CM

## 2023-01-09 DIAGNOSIS — Z28.21 REFUSED VARICELLA VACCINE: ICD-10-CM

## 2023-01-09 DIAGNOSIS — I50.22 CHRONIC SYSTOLIC CONGESTIVE HEART FAILURE (HCC): Primary | Chronic | ICD-10-CM

## 2023-01-09 DIAGNOSIS — J90 PLEURAL EFFUSION: Chronic | ICD-10-CM

## 2023-01-09 DIAGNOSIS — Z28.21 REFUSES TETANUS, DIPHTHERIA, AND ACELLULAR PERTUSSIS (TDAP) VACCINATION: ICD-10-CM

## 2023-01-09 PROBLEM — I50.21 ACUTE SYSTOLIC CHF (CONGESTIVE HEART FAILURE), NYHA CLASS 4 (HCC): Chronic | Status: ACTIVE | Noted: 2023-01-06

## 2023-01-09 PROBLEM — I50.21 ACUTE SYSTOLIC CHF (CONGESTIVE HEART FAILURE), NYHA CLASS 4 (HCC): Chronic | Status: RESOLVED | Noted: 2023-01-06 | Resolved: 2023-01-09

## 2023-01-09 PROBLEM — I48.19 PERSISTENT ATRIAL FIBRILLATION (HCC): Chronic | Status: ACTIVE | Noted: 2022-12-28

## 2023-01-09 PROCEDURE — 1123F ACP DISCUSS/DSCN MKR DOCD: CPT | Performed by: FAMILY MEDICINE

## 2023-01-09 PROCEDURE — G8420 CALC BMI NORM PARAMETERS: HCPCS | Performed by: FAMILY MEDICINE

## 2023-01-09 PROCEDURE — G8428 CUR MEDS NOT DOCUMENT: HCPCS | Performed by: FAMILY MEDICINE

## 2023-01-09 PROCEDURE — G0157 HHC PT ASSISTANT EA 15: HCPCS

## 2023-01-09 PROCEDURE — G8484 FLU IMMUNIZE NO ADMIN: HCPCS | Performed by: FAMILY MEDICINE

## 2023-01-09 PROCEDURE — 1036F TOBACCO NON-USER: CPT | Performed by: FAMILY MEDICINE

## 2023-01-09 PROCEDURE — 1111F DSCHRG MED/CURRENT MED MERGE: CPT | Performed by: FAMILY MEDICINE

## 2023-01-09 PROCEDURE — G8400 PT W/DXA NO RESULTS DOC: HCPCS | Performed by: FAMILY MEDICINE

## 2023-01-09 PROCEDURE — 1090F PRES/ABSN URINE INCON ASSESS: CPT | Performed by: FAMILY MEDICINE

## 2023-01-09 PROCEDURE — 99204 OFFICE O/P NEW MOD 45 MIN: CPT | Performed by: FAMILY MEDICINE

## 2023-01-09 RX ORDER — TORSEMIDE 20 MG/1
20 TABLET ORAL DAILY
Qty: 90 TABLET | Refills: 3 | Status: SHIPPED | OUTPATIENT
Start: 2023-01-09 | End: 2024-01-04

## 2023-01-09 ASSESSMENT — ENCOUNTER SYMPTOMS
DIARRHEA: 0
WHEEZING: 0
SHORTNESS OF BREATH: 1
CONSTIPATION: 0
VOMITING: 0
COUGH: 0
BACK PAIN: 0
SORE THROAT: 0
EYE PAIN: 0
ABDOMINAL PAIN: 0

## 2023-01-09 ASSESSMENT — PATIENT HEALTH QUESTIONNAIRE - PHQ9
SUM OF ALL RESPONSES TO PHQ QUESTIONS 1-9: 0
2. FEELING DOWN, DEPRESSED OR HOPELESS: 0
1. LITTLE INTEREST OR PLEASURE IN DOING THINGS: 0
SUM OF ALL RESPONSES TO PHQ QUESTIONS 1-9: 0
SUM OF ALL RESPONSES TO PHQ9 QUESTIONS 1 & 2: 0

## 2023-01-09 NOTE — Clinical Note
Apparently you are seeing her the 19th. I did not recheck renal fx today as it has been only 4 days since the last (which WAS better). Maybe you could recheck it when she sees you back. ? Taking furosemide for CHF (triggered by atrial fib?) Edema MUCH improved but still significant at lower ankles and feet. SOB improved too. Normal rhythm, today! THX.  [employer]

## 2023-01-09 NOTE — PATIENT INSTRUCTIONS
Chikis Carlos and Jerry Alexander (Cat)      The easiest way to reach us is via the [employer] patient portal. But if you are not computer \"literate,\" consider asking a family member or friend to help you get set up on your [facility] AND [facility] or smart phone. You might even want to \"allow\" a family member to get/send messages for you! In a more urgent situation, or if you have not heard back in a reasonable amount of time after leaving a [employer] message, just call the office at [phone]. If it's an emergency, always start by calling [phone].

## 2023-01-09 NOTE — PROGRESS NOTES
Nigel Munson DO                Diplomate of the [employer] of [facility] [place]         [phone]    Suzette Nichols is a 79 y.o. female who was seen on 1/9/2023 for   Chief Complaint   Patient presents with    New Patient     Declines flu vaccine    Congestive Heart Failure         Assessment & Plan     Diagnosis Orders   1. Chronic systolic congestive heart failure (HCC)  torsemide (DEMADEX) 20 MG tablet    Improved significantly. Has follow-up with cardiology scheduled next week. Dr. Jacqulynn Siemens. 2. History of atrial fibrillation      Converted to normal sinus rhythm in hospital      3. Pleural effusion      Undergoing workup Dr. Kiana Mcdonald. CT scheduled      4. Stage 3b chronic kidney disease ([facility]      Improved renal fx since hospital. Cardiology will PROBABLY check next week      5. 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 High risk medication use        7. Witnessed episode of apnea      Dr. Kiana Mcdonald has apparently ordered a home overnight pulse oximetry unit      8. Benign head tremor        9. Refused pneumococcal vaccine        10. Refused influenza vaccine        11. COVID-19 vaccination refused        12. Refused varicella vaccine        13. Refuses tetanus, diphtheria, and acellular pertussis (Tdap) vaccination            Follow-up and Dispositions    Return in about 3 months (around 4/9/2023) for ROUTINE BEA. RECENT LABS/TESTS TO REVIEW and DISCUSS    No results found for this visit on 01/09/23.     PHQ-9  1/9/2023   Little interest or pleasure in doing things 0   Feeling down, depressed, or hopeless 0   PHQ-2 Score 0   PHQ-9 Total Score 0       Hospitalist Discharge Summary   Admit Date:     12/28/2022  4:39 PM   DC Note date: 1/1/2023  Name:              Suzette Nichols   Age:                 79 y.o.  Sex:                 female  :               1940   MRN:               [id number]   Room:                PCP:                None None     Presenting Complaint: Leg Swelling     Initial Admission Diagnosis: Pleural effusion [K24]  Acute systolic CHF (congestive heart failure), NYHA class 4 (HCC) [I50.21]  Atrial fibrillation, unspecified type ([address] [I48.91]  New onset of congestive heart failure ([address] [I50.9]      Problem List for this Hospitalization (present on admission):     Principal Problem:    Acute systolic CHF (congestive heart failure), NYHA class 4 ([address]  Active Problems:    Atrial fibrillation ([address]  Resolved Problems:    New onset of congestive heart failure (HCC)    Pleural effusion        Hospital Course:  Patient is an 80 y/o female with no past medical history (no PCP/MD evaluation in > 15 years), no home medications who presented to ED from Urgent Care with cc SOB, BLE edema, elevated BNP. ED workup: /91 HR 78 stable on RA  EKG with controlled atrial fibrillation  Labs notable for WBC 15.2, Hgb/Hct 15.1/47.5, Plt 520, Na 141, K 3.7, BUN/Cr 11/1.30, pBNP 23010, hs trop 27, TSH 0.649, procal 0.21  CXR showed large right pleural effusion. Patient was given IV Lasix 40 mg in ED and initiated on Heparin gtt. Hospitalist consulted for admission due to suspected new onset acute heart failure. Cardiology and Pulmonology consulted. Echo obtained demonstrating LVEF 15-20% with severe global hypokinesis, abnormal diastolic function, reduced RV systolic function, severe MVR and TVR, LAD and RAD. Patient remained in persistent atrial fibrillation on telemetry monitoring. Cardiology has offered further invasive procedures, but patient declines. Patient wishes to continue rate control anticoagulation strategy with symptomatic treatment for heart failure. Patient converted to NSR with medication adjustment.  Patient underwent R thoracentesis  with Pulmonology with 1500 cc fluid removed, fluid transudate. Oximetry test prior to discharge with no supplemental O2 requirements. Will send home with Doxycyline course given mild leukocytosis and elevated procal with complete of productive cough. Remains afebrile with no CXR evidence of pneumonia. Lengthy discussion with patient and family regarding recommendations for initiation of oral anticoagulation. Patient is a high cva risk given atrial fibrillation and CHF. Family and patient are concerned with risks associated with anticoagulation. At this time, they verbalize understanding of cva risk but declined Eliquis initiation at this time. They have asked for initiation of aspirin to reduce cva risk. Cardiology and Pulmonology have cleared patient to discharge home today. Medications recommendations reviewed with patient and family. Extensive HF education provided by Cardiology team. Patient will need both Cardiology and Pulmonology follow-ups. [facility] referral also placed for new PCP with [address] [facility] referral also placed. Discharge plan was discussed with patient, family at bedside, care team. Dr. Valery Myers. All questions answered. Patient was stable at time of discharge. Instructions given to call a physician or return if any concerns. Discussed s/sx that would prompt need for return evaluation in ED. Disposition: Home  Diet: ADULT DIET; Regular; Low Sodium (2 gm); 2000 ml  Code Status: Full Code   Subjective    HPI:     An 59-year-old female patient here to establish care at [address] She has been recently hospitalized at [facility] with a diagnosis of congestive heart failure causing severe leg swelling and shortness of breath. Her daughter who accompanies her today, says that she had been having swelling in both lower extremities for several months. But no one knew about it until a [employer] gathering on 12/25/2022.   Her daughter insisted that she go to the emergency room where she was admitted. She is found to be in atrial fibrillation and and congestive heart failure with significant lower extremity edema. She apparently responded well to diuretics and antiarrhythmics. She converted to normal sinus rhythm. Renal function is certainly impaired and got worse before she left the hospital but had rebounded slightly when it was tested during her pulmonary visit last week. She is taking torsemide and is on fluid restriction. Hopefully 1 or the other of these can be reduced when she sees cardiology next week. When Dr. Maine Elias saw the patient, she suggested checking TSH but subsequently discovered that this had been tested while the patient was in the hospital and was normal.    Patient has declined all immunizations. The patient is due for her annual wellness visit but we are unable to perform that today because of our recent change in electronic medical records. We will schedule follow-up for this in the near future. Reviewed and updated this visit by provider:       Review of Systems   Constitutional:  Negative for fever and unexpected weight change. HENT:  Negative for congestion, ear pain and sore throat. Eyes:  Negative for pain. Respiratory:  Positive for shortness of breath (Marked improvement). Negative for cough and wheezing. Cardiovascular:  Positive for leg swelling (Marked improvement). Negative for chest pain and palpitations. Gastrointestinal:  Negative for abdominal pain, constipation, diarrhea and vomiting. Genitourinary:  Negative for difficulty urinating, dysuria and frequency. Musculoskeletal:  Negative for arthralgias, back pain and myalgias. Skin:  Negative for rash. Neurological:  Positive for tremors. Negative for dizziness, weakness and headaches. Psychiatric/Behavioral:  Negative for confusion and dysphoric mood. The patient is not nervous/anxious. All other systems reviewed and are negative.     Objective    BP (!) 100/58 (Site: Left Upper Arm, Position: Sitting, Cuff Size: Child)   Pulse 79   Ht 5' 2\" (1.575 m)   Wt 104 lb (47.2 kg)   SpO2 96%   BMI 19.02 kg/m²     Physical Exam  Vitals reviewed. Constitutional:       General: She is not in acute distress. Appearance: Normal appearance. HENT:      Head: Normocephalic and atraumatic. Right Ear: Tympanic membrane normal.      Left Ear: Tympanic membrane normal.      Mouth/Throat:      Mouth: Mucous membranes are moist.   Eyes:      Extraocular Movements: Extraocular movements intact. Conjunctiva/sclera: Conjunctivae normal.      Pupils: Pupils are equal, round, and reactive to light. Neck:      Vascular: No carotid bruit. Cardiovascular:      Rate and Rhythm: Normal rate and regular rhythm. Heart sounds: Normal heart sounds. Pulmonary:      Effort: Pulmonary effort is normal.      Breath sounds: Normal breath sounds. Abdominal:      Palpations: Abdomen is soft. There is no mass. Tenderness: There is no abdominal tenderness. Musculoskeletal:      Cervical back: No tenderness. Right lower le+ Pitting Edema (lower ankles and feet) present. Left lower le+ Pitting Edema (lower ankles and feet) present. Lymphadenopathy:      Cervical: No cervical adenopathy. Neurological:      General: No focal deficit present. Mental Status: She is alert and oriented to person, place, and time. Motor: Tremor (head shaking) present. Psychiatric:         Attention and Perception: Attention normal.         Mood and Affect: Mood normal. Affect is blunt. Speech: Speech normal.         Behavior: Behavior normal. Behavior is cooperative. Thought Content: Thought content normal.         Cognition and Memory: Cognition is impaired. Judgment: Judgment is inappropriate (regarding health).        On this date 2023 I have spent 46 minutes reviewing previous notes, lab/imaging results and face to face with the patient discussing the diagnoses and importance of compliance with the treatment plan, as well as documenting on the day of the visit.         Shaun Conner DO  [facility]

## 2023-01-10 ENCOUNTER — HOME CARE VISIT (OUTPATIENT)
Dept: SCHEDULING | Facility: HOME HEALTH | Age: 83
End: 2023-01-10
Payer: MEDICARE

## 2023-01-10 VITALS
SYSTOLIC BLOOD PRESSURE: 98 MMHG | TEMPERATURE: 97.5 F | OXYGEN SATURATION: 94 % | RESPIRATION RATE: 17 BRPM | DIASTOLIC BLOOD PRESSURE: 60 MMHG | HEART RATE: 74 BPM

## 2023-01-10 PROCEDURE — G0299 HHS/HOSPICE OF RN EA 15 MIN: HCPCS

## 2023-01-10 ASSESSMENT — ENCOUNTER SYMPTOMS: HEMOPTYSIS: 0

## 2023-01-11 ENCOUNTER — TELEPHONE (OUTPATIENT)
Dept: HOME HEALTH SERVICES | Facility: HOME HEALTH | Age: 83
End: 2023-01-11

## 2023-01-11 ENCOUNTER — TELEPHONE (OUTPATIENT)
Dept: FAMILY MEDICINE CLINIC | Facility: CLINIC | Age: 83
End: 2023-01-11

## 2023-01-11 ENCOUNTER — HOME CARE VISIT (OUTPATIENT)
Dept: SCHEDULING | Facility: HOME HEALTH | Age: 83
End: 2023-01-11
Payer: MEDICARE

## 2023-01-11 VITALS
HEART RATE: 78 BPM | DIASTOLIC BLOOD PRESSURE: 60 MMHG | TEMPERATURE: 97.7 F | SYSTOLIC BLOOD PRESSURE: 110 MMHG | RESPIRATION RATE: 17 BRPM | OXYGEN SATURATION: 97 %

## 2023-01-11 PROCEDURE — G0157 HHC PT ASSISTANT EA 15: HCPCS

## 2023-01-11 NOTE — TELEPHONE ENCOUNTER
Received message from [facility] with [facility], stated he has been seeing  patient since discharge from hospital  He is requesting verbal orders from Dr Madhu Pandya for patient , twice a week for five weeks, then once a week for two weeks  Please advise  [facility] call back # [phone]

## 2023-01-11 NOTE — TELEPHONE ENCOUNTER
Per daughter, Mrs. Antonette Ozuna is doing OK. She has gained 1 pound. She had more fluid removed from her lungs last week and seems to be OK now. She saw Dr. Eunice Kaur as new patient on 1/9. He continued her current medications and said he would let her cardiologist do any changes needed. No medication questions at this time.

## 2023-01-12 ENCOUNTER — TELEPHONE (OUTPATIENT)
Dept: FAMILY MEDICINE CLINIC | Facility: CLINIC | Age: 83
End: 2023-01-12

## 2023-01-12 ENCOUNTER — HOME CARE VISIT (OUTPATIENT)
Dept: SCHEDULING | Facility: HOME HEALTH | Age: 83
End: 2023-01-12
Payer: MEDICARE

## 2023-01-12 VITALS
HEART RATE: 68 BPM | SYSTOLIC BLOOD PRESSURE: 98 MMHG | DIASTOLIC BLOOD PRESSURE: 60 MMHG | RESPIRATION RATE: 16 BRPM | TEMPERATURE: 97.8 F | OXYGEN SATURATION: 94 %

## 2023-01-12 PROCEDURE — G0299 HHS/HOSPICE OF RN EA 15 MIN: HCPCS

## 2023-01-12 ASSESSMENT — ENCOUNTER SYMPTOMS: HEMOPTYSIS: 0

## 2023-01-12 NOTE — TELEPHONE ENCOUNTER
Received message from Orlando Baldwin with [phone] verbal orders to extend skilled nursing for patient    Call back [phone]  Please advise

## 2023-01-12 NOTE — TELEPHONE ENCOUNTER
Hira Gray notified ok verbal orders by Dr Piedra Messing     No answer, verbal orders left via voice mail

## 2023-01-16 ENCOUNTER — HOME CARE VISIT (OUTPATIENT)
Dept: SCHEDULING | Facility: HOME HEALTH | Age: 83
End: 2023-01-16
Payer: MEDICARE

## 2023-01-16 ENCOUNTER — HOSPITAL ENCOUNTER (OUTPATIENT)
Dept: CT IMAGING | Age: 83
Discharge: HOME | End: 2023-01-19
Payer: MEDICARE

## 2023-01-16 VITALS
OXYGEN SATURATION: 96 % | DIASTOLIC BLOOD PRESSURE: 60 MMHG | SYSTOLIC BLOOD PRESSURE: 108 MMHG | HEART RATE: 86 BPM | RESPIRATION RATE: 17 BRPM | TEMPERATURE: 97.7 F

## 2023-01-16 DIAGNOSIS — J90 RECURRENT RIGHT PLEURAL EFFUSION: ICD-10-CM

## 2023-01-16 PROCEDURE — G0157 HHC PT ASSISTANT EA 15: HCPCS

## 2023-01-16 PROCEDURE — 71250 CT THORAX DX C-: CPT

## 2023-01-17 ENCOUNTER — HOME CARE VISIT (OUTPATIENT)
Dept: SCHEDULING | Facility: HOME HEALTH | Age: 83
End: 2023-01-17
Payer: MEDICARE

## 2023-01-17 VITALS
DIASTOLIC BLOOD PRESSURE: 62 MMHG | TEMPERATURE: 97.4 F | RESPIRATION RATE: 16 BRPM | BODY MASS INDEX: 18.44 KG/M2 | HEART RATE: 69 BPM | WEIGHT: 100.8 LBS | OXYGEN SATURATION: 96 % | SYSTOLIC BLOOD PRESSURE: 110 MMHG

## 2023-01-17 PROCEDURE — G0299 HHS/HOSPICE OF RN EA 15 MIN: HCPCS

## 2023-01-17 ASSESSMENT — ENCOUNTER SYMPTOMS
HEMOPTYSIS: 0
DYSPNEA ACTIVITY LEVEL: AFTER AMBULATING 10 - 20 FT

## 2023-01-18 ENCOUNTER — HOME CARE VISIT (OUTPATIENT)
Dept: SCHEDULING | Facility: HOME HEALTH | Age: 83
End: 2023-01-18
Payer: MEDICARE

## 2023-01-18 VITALS
OXYGEN SATURATION: 95 % | DIASTOLIC BLOOD PRESSURE: 60 MMHG | TEMPERATURE: 97.8 F | RESPIRATION RATE: 17 BRPM | HEART RATE: 76 BPM | SYSTOLIC BLOOD PRESSURE: 110 MMHG

## 2023-01-18 PROCEDURE — G0157 HHC PT ASSISTANT EA 15: HCPCS

## 2023-01-19 ENCOUNTER — OFFICE VISIT (OUTPATIENT)
Dept: CARDIOLOGY CLINIC | Age: 83
End: 2023-01-19
Payer: MEDICARE

## 2023-01-19 ENCOUNTER — NURSE ONLY (OUTPATIENT)
Dept: FAMILY MEDICINE CLINIC | Facility: CLINIC | Age: 83
End: 2023-01-19

## 2023-01-19 ENCOUNTER — TELEPHONE (OUTPATIENT)
Dept: HOME HEALTH SERVICES | Facility: HOME HEALTH | Age: 83
End: 2023-01-19

## 2023-01-19 VITALS
HEIGHT: 62 IN | HEART RATE: 68 BPM | SYSTOLIC BLOOD PRESSURE: 122 MMHG | BODY MASS INDEX: 18.62 KG/M2 | DIASTOLIC BLOOD PRESSURE: 66 MMHG | WEIGHT: 101.2 LBS

## 2023-01-19 DIAGNOSIS — I48.19 PERSISTENT ATRIAL FIBRILLATION (HCC): Primary | ICD-10-CM

## 2023-01-19 DIAGNOSIS — I48.19 PERSISTENT ATRIAL FIBRILLATION (HCC): ICD-10-CM

## 2023-01-19 DIAGNOSIS — I50.22 CHRONIC SYSTOLIC CONGESTIVE HEART FAILURE (HCC): Chronic | ICD-10-CM

## 2023-01-19 DIAGNOSIS — N18.32 STAGE 3B CHRONIC KIDNEY DISEASE (HCC): Chronic | ICD-10-CM

## 2023-01-19 DIAGNOSIS — J90 PLEURAL EFFUSION: Chronic | ICD-10-CM

## 2023-01-19 LAB
ANION GAP SERPL CALC-SCNC: 9 MMOL/L (ref 2–11)
BUN SERPL-MCNC: 18 MG/DL (ref 8–23)
CALCIUM SERPL-MCNC: 10.3 MG/DL (ref 8.3–10.4)
CHLORIDE SERPL-SCNC: 99 MMOL/L (ref 101–110)
CO2 SERPL-SCNC: 30 MMOL/L (ref 21–32)
CREAT SERPL-MCNC: 1.4 MG/DL (ref 0.6–1)
ERYTHROCYTE [DISTWIDTH] IN BLOOD BY AUTOMATED COUNT: 18.3 % (ref 11.9–14.6)
GLUCOSE SERPL-MCNC: 83 MG/DL (ref 65–100)
HCT VFR BLD AUTO: 51.9 % (ref 35.8–46.3)
HGB BLD-MCNC: 17 G/DL (ref 11.7–15.4)
MCH RBC QN AUTO: 31.7 PG (ref 26.1–32.9)
MCHC RBC AUTO-ENTMCNC: 32.8 G/DL (ref 31.4–35)
MCV RBC AUTO: 96.8 FL (ref 82–102)
NRBC # BLD: 0 K/UL (ref 0–0.2)
PLATELET # BLD AUTO: 775 K/UL (ref 150–450)
PMV BLD AUTO: 11.3 FL (ref 9.4–12.3)
POTASSIUM SERPL-SCNC: 5.1 MMOL/L (ref 3.5–5.1)
RBC # BLD AUTO: 5.36 M/UL (ref 4.05–5.2)
SODIUM SERPL-SCNC: 138 MMOL/L (ref 133–143)
WBC # BLD AUTO: 16.1 K/UL (ref 4.3–11.1)

## 2023-01-19 PROCEDURE — 99214 OFFICE O/P EST MOD 30 MIN: CPT | Performed by: INTERNAL MEDICINE

## 2023-01-19 PROCEDURE — 1036F TOBACCO NON-USER: CPT | Performed by: INTERNAL MEDICINE

## 2023-01-19 PROCEDURE — 1123F ACP DISCUSS/DSCN MKR DOCD: CPT | Performed by: INTERNAL MEDICINE

## 2023-01-19 PROCEDURE — G8400 PT W/DXA NO RESULTS DOC: HCPCS | Performed by: INTERNAL MEDICINE

## 2023-01-19 PROCEDURE — 1090F PRES/ABSN URINE INCON ASSESS: CPT | Performed by: INTERNAL MEDICINE

## 2023-01-19 PROCEDURE — 1111F DSCHRG MED/CURRENT MED MERGE: CPT | Performed by: INTERNAL MEDICINE

## 2023-01-19 PROCEDURE — 93000 ELECTROCARDIOGRAM COMPLETE: CPT | Performed by: INTERNAL MEDICINE

## 2023-01-19 PROCEDURE — G8484 FLU IMMUNIZE NO ADMIN: HCPCS | Performed by: INTERNAL MEDICINE

## 2023-01-19 PROCEDURE — G8427 DOCREV CUR MEDS BY ELIG CLIN: HCPCS | Performed by: INTERNAL MEDICINE

## 2023-01-19 PROCEDURE — G8420 CALC BMI NORM PARAMETERS: HCPCS | Performed by: INTERNAL MEDICINE

## 2023-01-19 RX ORDER — TORSEMIDE 10 MG/1
10 TABLET ORAL DAILY
Qty: 30 TABLET | Refills: 3 | Status: SHIPPED | OUTPATIENT
Start: 2023-01-19

## 2023-01-19 RX ORDER — METOPROLOL SUCCINATE 25 MG/1
25 TABLET, EXTENDED RELEASE ORAL 2 TIMES DAILY
Qty: 180 TABLET | Refills: 3 | Status: SHIPPED | OUTPATIENT
Start: 2023-01-19 | End: 2023-02-18

## 2023-01-19 RX ORDER — DIGOXIN 125 MCG
125 TABLET ORAL DAILY
Qty: 90 TABLET | Refills: 3 | Status: SHIPPED | OUTPATIENT
Start: 2023-01-19 | End: 2023-02-18

## 2023-01-19 ASSESSMENT — ENCOUNTER SYMPTOMS: SHORTNESS OF BREATH: 0

## 2023-01-19 NOTE — PROGRESS NOTES
[address] [place], [place]  [address], [address] [facility], [address] [place] [address]  PHONE: [phone]    Marilyn Osborn  1940      SUBJECTIVE:   Marilyn Osborn is a 79 y.o. female seen for a follow up visit regarding the following:     Chief Complaint   Patient presents with    Atrial Fibrillation       HPI:    Patient presents for follow-up. Recently hospitalized with decompensated heart failure which was a new diagnosis and atrial fibrillation. She declined anticoagulation or cardioversion. She opted for rate control and diuretic therapy. She has done surprisingly well. She remains in atrial fibrillation with a heart rate today is 68. She is taking Demadex 20 mg daily and her edema has resolved. Recent underwent CT scan by pulmonary due to recurrent pleural effusions. This showed minimal right pleural effusion. She denies any PND orthopnea. She continues to decline anticoagulation and understands her risk of CVA. Past Medical History, Past Surgical History, Family history, Social History, and Medications were all reviewed with the patient today and updated as necessary.            Current Outpatient Medications:     empagliflozin (JARDIANCE) 10 MG tablet, Take 1 tablet by mouth daily, Disp: 90 tablet, Rfl: 3    metoprolol succinate (TOPROL XL) 25 MG extended release tablet, Take 1 tablet by mouth in the morning and at bedtime, Disp: 180 tablet, Rfl: 3    digoxin (LANOXIN) 125 MCG tablet, Take 1 tablet by mouth daily, Disp: 90 tablet, Rfl: 3    torsemide (DEMADEX) 10 MG tablet, Take 1 tablet by mouth daily, Disp: 30 tablet, Rfl: 3    torsemide (DEMADEX) 20 MG tablet, Take 1 tablet by mouth daily, Disp: 90 tablet, Rfl: 3    aspirin 81 MG chewable tablet, Take 1 tablet by mouth daily, Disp: 30 tablet, Rfl: 0     No Known Allergies     Patient Active Problem List    Diagnosis Date Noted    Stage 3b chronic kidney disease (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 01/09/2023     Priority: Medium    Benign head tremor 01/09/2023     Priority: Medium    Refused pneumococcal vaccine 01/09/2023     Priority: Medium    Refused influenza vaccine 01/09/2023     Priority: Medium    COVID-19 vaccination refused 01/09/2023     Priority: Medium    Refused varicella vaccine 01/09/2023     Priority: Medium    Refuses tetanus, diphtheria, and acellular pertussis (Tdap) vaccination 01/09/2023     Priority: Medium    High risk medication use 01/09/2023     Priority: Medium    Witnessed episode of apnea 01/09/2023     Priority: Medium    Persistent atrial fibrillation ([facility] [address] 12/28/2022     Priority: Medium    Pleural effusion 12/28/2022     Priority: Medium    Chronic systolic congestive heart failure ([facility] [address] 12/28/2022     Priority: Medium     Echo (12/29/22):  EF 15-20%. Severe GHK. RV dysfunction. Severe TR/MR. RVSP 47. Social History     Tobacco Use    Smoking status: Never     Passive exposure: Never    Smokeless tobacco: Never   Substance Use Topics    Alcohol use: Yes     Comment: occ       ROS:    Review of Systems   Constitutional: Negative for malaise/fatigue. Cardiovascular:  Negative for chest pain. Respiratory:  Negative for shortness of breath. Musculoskeletal:  Positive for arthritis. Neurological:  Negative for focal weakness. Psychiatric/Behavioral:  Negative for depression. PHYSICAL EXAM:  Wt Readings from Last 3 Encounters:   01/19/23 101 lb 3.2 oz (45.9 kg)   01/17/23 100 lb 12.8 oz (45.7 kg)   01/06/23 109 lb (49.4 kg)     BP Readings from Last 3 Encounters:   01/19/23 122/66   01/18/23 110/60   01/17/23 110/62     Pulse Readings from Last 3 Encounters:   01/19/23 68   01/18/23 76   01/17/23 69       Physical Exam  Constitutional:       General: She is not in acute distress. Appearance: Normal appearance. Neck:      Vascular: No carotid bruit. Cardiovascular:      Rate and Rhythm: Normal rate.  Rhythm irregular. Pulmonary:      Breath sounds: Normal breath sounds. No wheezing. Abdominal:      General: There is no distension. Palpations: Abdomen is soft. Musculoskeletal:         General: Swelling (trivial) present. Skin:     General: Skin is warm and dry. Neurological:      General: No focal deficit present. Psychiatric:         Mood and Affect: Mood normal.       Medical problems and test results were reviewed with the patient today. Lab Results   Component Value Date    WBC 13.0 (H) 01/01/2023    HGB 14.4 01/01/2023    HCT 45.5 01/01/2023    MCV 94.0 01/01/2023     01/01/2023       Lab Results   Component Value Date/Time     01/05/2023 09:57 AM    K 4.8 01/05/2023 09:57 AM     01/05/2023 09:57 AM    CO2 33 01/05/2023 09:57 AM    BUN 14 01/05/2023 09:57 AM    CREATININE 1.30 01/05/2023 09:57 AM    GLUCOSE 91 01/05/2023 09:57 AM    CALCIUM 8.9 01/05/2023 09:57 AM        No results found for: CHOL  No results found for: TRIG  No results found for: HDL  No results found for: LDLCHOLESTEROL, LDLCALC  No results found for: LABVLDL, VLDL  No results found for: CHOLHDLRATIO     Data from outside records/labs from outside providers have been reviewed and summarized as noted in the HPI, past history and data review sections of this note     EKG done today and reviewed with patient showed:  Atrial fibrillation   Voltage criteria for LVH  (R(aVF) exceeds 1.50 mV). -Anteroseptal infarct -age undetermined. Non-specific ST/T wave abnormalities. Rate 68    ASSESSMENT and PLAN      1. Persistent atrial fibrillation ([address]  Patient currently rate controlled with low-dose Toprol and digoxin. She has declined anticoagulation. She understands her risk of CVA. - EKG 12 lead  - empagliflozin (JARDIANCE) 10 MG tablet; Take 1 tablet by mouth daily  Dispense: 90 tablet; Refill: 3  - metoprolol succinate (TOPROL XL) 25 MG extended release tablet;  Take 1 tablet by mouth in the morning and at bedtime  Dispense: 180 tablet; Refill: 3  - digoxin (LANOXIN) 125 MCG tablet; Take 1 tablet by mouth daily  Dispense: 90 tablet; Refill: 3  - Basic Metabolic Panel; Future  - CBC; Future    2. Chronic systolic congestive heart failure (HCC)  Check echo prior to next visit. Continue Toprol and Jardiance. Decrease Demadex to 10 mg a day as she does not appear to be volume overloaded currently. Reconsider changing diuretic to Aldactone at next visit if renal function stable. - empagliflozin (JARDIANCE) 10 MG tablet; Take 1 tablet by mouth daily  Dispense: 90 tablet; Refill: 3  - metoprolol succinate (TOPROL XL) 25 MG extended release tablet; Take 1 tablet by mouth in the morning and at bedtime  Dispense: 180 tablet; Refill: 3  - digoxin (LANOXIN) 125 MCG tablet; Take 1 tablet by mouth daily  Dispense: 90 tablet; Refill: 3  - torsemide (DEMADEX) 10 MG tablet; Take 1 tablet by mouth daily  Dispense: 30 tablet; Refill: 3  - Basic Metabolic Panel; Future  - CBC; Future  - Transthoracic echocardiogram (TTE) complete with contrast, bubble, strain, and 3D PRN; Future    3. Stage 3b chronic kidney disease ([facility] [address]  Check labs. 4. Pleural effusion  Recent chest CT with small effusion. Continue diuretic therapy. Return in about 6 weeks (around 3/2/2023). Daniel Reid MD  1/19/2023  12:34 PM    This note may have been dictated using speech recognition software.   As a result, error of speech recognition may have occurred

## 2023-01-19 NOTE — TELEPHONE ENCOUNTER
Voice mail answered my call. I left my name and cell phone number. I asked Mrs. Gerber Millan to call me with any medication questions or issues.

## 2023-01-20 ENCOUNTER — TELEPHONE (OUTPATIENT)
Dept: CARDIOLOGY CLINIC | Age: 83
End: 2023-01-20

## 2023-01-20 ENCOUNTER — HOME CARE VISIT (OUTPATIENT)
Dept: SCHEDULING | Facility: HOME HEALTH | Age: 83
End: 2023-01-20
Payer: MEDICARE

## 2023-01-20 DIAGNOSIS — I50.22 CHRONIC SYSTOLIC CONGESTIVE HEART FAILURE (HCC): Primary | ICD-10-CM

## 2023-01-20 PROCEDURE — G0299 HHS/HOSPICE OF RN EA 15 MIN: HCPCS

## 2023-01-20 NOTE — RESULT ENCOUNTER NOTE
Please call patient. Labs are OK. I decreased her demadex the other day.   Can we recheck BMP in 2 weeks to reevaluate  Thank you

## 2023-01-20 NOTE — TELEPHONE ENCOUNTER
Patient called with results voiced understanding orders entered//brendab    ----- Message from Nenita Marroquin MD sent at 1/20/2023 12:27 PM EST -----  Please call patient. Labs are OK. I decreased her demadex the other day.   Can we recheck BMP in 2 weeks to reevaluate  Thank you

## 2023-01-21 VITALS
DIASTOLIC BLOOD PRESSURE: 67 MMHG | RESPIRATION RATE: 18 BRPM | TEMPERATURE: 97.8 F | OXYGEN SATURATION: 94 % | SYSTOLIC BLOOD PRESSURE: 100 MMHG | HEART RATE: 60 BPM

## 2023-01-21 DIAGNOSIS — I50.22 CHRONIC SYSTOLIC CONGESTIVE HEART FAILURE (HCC): Chronic | ICD-10-CM

## 2023-01-21 DIAGNOSIS — I48.19 PERSISTENT ATRIAL FIBRILLATION (HCC): ICD-10-CM

## 2023-01-21 ASSESSMENT — ENCOUNTER SYMPTOMS: HEMOPTYSIS: 0

## 2023-01-23 ENCOUNTER — HOME CARE VISIT (OUTPATIENT)
Dept: SCHEDULING | Facility: HOME HEALTH | Age: 83
End: 2023-01-23
Payer: MEDICARE

## 2023-01-23 VITALS
OXYGEN SATURATION: 96 % | HEART RATE: 64 BPM | RESPIRATION RATE: 16 BRPM | DIASTOLIC BLOOD PRESSURE: 62 MMHG | TEMPERATURE: 97.9 F | SYSTOLIC BLOOD PRESSURE: 100 MMHG

## 2023-01-23 PROCEDURE — G0299 HHS/HOSPICE OF RN EA 15 MIN: HCPCS

## 2023-01-23 RX ORDER — TORSEMIDE 10 MG/1
10 TABLET ORAL DAILY
Qty: 30 TABLET | Refills: 3 | OUTPATIENT
Start: 2023-01-23

## 2023-01-23 RX ORDER — METOPROLOL SUCCINATE 25 MG/1
25 TABLET, EXTENDED RELEASE ORAL 2 TIMES DAILY
Qty: 180 TABLET | Refills: 3 | OUTPATIENT
Start: 2023-01-23 | End: 2023-02-22

## 2023-01-23 RX ORDER — DIGOXIN 125 MCG
125 TABLET ORAL DAILY
Qty: 90 TABLET | Refills: 3 | OUTPATIENT
Start: 2023-01-23 | End: 2023-02-22

## 2023-01-23 ASSESSMENT — ENCOUNTER SYMPTOMS: HEMOPTYSIS: 0

## 2023-01-24 ENCOUNTER — HOME CARE VISIT (OUTPATIENT)
Dept: SCHEDULING | Facility: HOME HEALTH | Age: 83
End: 2023-01-24
Payer: MEDICARE

## 2023-01-24 VITALS
SYSTOLIC BLOOD PRESSURE: 100 MMHG | DIASTOLIC BLOOD PRESSURE: 62 MMHG | OXYGEN SATURATION: 98 % | TEMPERATURE: 97.6 F | HEART RATE: 62 BPM | RESPIRATION RATE: 16 BRPM

## 2023-01-24 PROCEDURE — G0151 HHCP-SERV OF PT,EA 15 MIN: HCPCS

## 2023-01-25 ENCOUNTER — TELEPHONE (OUTPATIENT)
Dept: HOME HEALTH SERVICES | Facility: HOME HEALTH | Age: 83
End: 2023-01-25

## 2023-01-25 NOTE — TELEPHONE ENCOUNTER
My call was answered by voice mail. I left my name and cell phone number. I asked Janey Damian Ochoa to call me with any medication questions or issues.

## 2023-01-26 ENCOUNTER — HOME CARE VISIT (OUTPATIENT)
Dept: SCHEDULING | Facility: HOME HEALTH | Age: 83
End: 2023-01-26
Payer: MEDICARE

## 2023-01-26 VITALS
OXYGEN SATURATION: 94 % | TEMPERATURE: 97.6 F | SYSTOLIC BLOOD PRESSURE: 110 MMHG | DIASTOLIC BLOOD PRESSURE: 60 MMHG | RESPIRATION RATE: 16 BRPM | HEART RATE: 74 BPM

## 2023-01-26 PROCEDURE — G0299 HHS/HOSPICE OF RN EA 15 MIN: HCPCS

## 2023-01-26 ASSESSMENT — ENCOUNTER SYMPTOMS: HEMOPTYSIS: 0

## 2023-01-30 ENCOUNTER — HOME CARE VISIT (OUTPATIENT)
Dept: SCHEDULING | Facility: HOME HEALTH | Age: 83
End: 2023-01-30
Payer: MEDICARE

## 2023-01-30 PROCEDURE — G0299 HHS/HOSPICE OF RN EA 15 MIN: HCPCS

## 2023-01-31 VITALS
SYSTOLIC BLOOD PRESSURE: 110 MMHG | DIASTOLIC BLOOD PRESSURE: 64 MMHG | RESPIRATION RATE: 16 BRPM | OXYGEN SATURATION: 97 % | BODY MASS INDEX: 18.88 KG/M2 | TEMPERATURE: 97.7 F | HEART RATE: 64 BPM | WEIGHT: 103.2 LBS

## 2023-01-31 ASSESSMENT — ENCOUNTER SYMPTOMS: HEMOPTYSIS: 0

## 2023-02-02 ENCOUNTER — NURSE ONLY (OUTPATIENT)
Dept: FAMILY MEDICINE CLINIC | Facility: CLINIC | Age: 83
End: 2023-02-02

## 2023-02-02 ENCOUNTER — TELEPHONE (OUTPATIENT)
Dept: HOME HEALTH SERVICES | Facility: HOME HEALTH | Age: 83
End: 2023-02-02

## 2023-02-02 DIAGNOSIS — I50.22 CHRONIC SYSTOLIC CONGESTIVE HEART FAILURE (HCC): ICD-10-CM

## 2023-02-02 NOTE — TELEPHONE ENCOUNTER
Talked with Cathleen, Mrs. Toshia Mayer daughter, this morning. Explained I was Home Health Pharmacist and just following up with any medication questions or concerns they may have. She stated Mrs. Gerber Rabago is doing well and her cardiologist and family med doctor are following her closely. No questions at this time. 1/19: Decreased Torsemide 10mg daily and will have lab work drawn today by [facility] as well as an echo on 2/16. Follow up with Dr. Victor Hugo Robles 3/2 to determine if all medications and heart/kidneys are working properly.

## 2023-02-03 ENCOUNTER — HOME CARE VISIT (OUTPATIENT)
Dept: SCHEDULING | Facility: HOME HEALTH | Age: 83
End: 2023-02-03
Payer: MEDICARE

## 2023-02-03 VITALS
HEART RATE: 67 BPM | DIASTOLIC BLOOD PRESSURE: 70 MMHG | TEMPERATURE: 97.2 F | OXYGEN SATURATION: 95 % | SYSTOLIC BLOOD PRESSURE: 110 MMHG | RESPIRATION RATE: 17 BRPM

## 2023-02-03 LAB
ANION GAP SERPL CALC-SCNC: 4 MMOL/L (ref 2–11)
BUN SERPL-MCNC: 17 MG/DL (ref 8–23)
CALCIUM SERPL-MCNC: 9.3 MG/DL (ref 8.3–10.4)
CHLORIDE SERPL-SCNC: 105 MMOL/L (ref 101–110)
CO2 SERPL-SCNC: 30 MMOL/L (ref 21–32)
CREAT SERPL-MCNC: 1.2 MG/DL (ref 0.6–1)
GLUCOSE SERPL-MCNC: 81 MG/DL (ref 65–100)
POTASSIUM SERPL-SCNC: 5.4 MMOL/L (ref 3.5–5.1)
SODIUM SERPL-SCNC: 139 MMOL/L (ref 133–143)

## 2023-02-03 PROCEDURE — G0299 HHS/HOSPICE OF RN EA 15 MIN: HCPCS

## 2023-02-03 ASSESSMENT — ENCOUNTER SYMPTOMS: HEMOPTYSIS: 0

## 2023-02-06 ENCOUNTER — CLINICAL DOCUMENTATION (OUTPATIENT)
Dept: FAMILY MEDICINE CLINIC | Facility: CLINIC | Age: 83
End: 2023-02-06

## 2023-02-07 ENCOUNTER — HOME CARE VISIT (OUTPATIENT)
Dept: SCHEDULING | Facility: HOME HEALTH | Age: 83
End: 2023-02-07
Payer: MEDICARE

## 2023-02-07 VITALS
OXYGEN SATURATION: 97 % | TEMPERATURE: 97.4 F | SYSTOLIC BLOOD PRESSURE: 110 MMHG | RESPIRATION RATE: 17 BRPM | HEART RATE: 72 BPM | DIASTOLIC BLOOD PRESSURE: 70 MMHG

## 2023-02-07 PROCEDURE — G0299 HHS/HOSPICE OF RN EA 15 MIN: HCPCS

## 2023-02-07 ASSESSMENT — ENCOUNTER SYMPTOMS: HEMOPTYSIS: 0

## 2023-02-10 ENCOUNTER — HOME CARE VISIT (OUTPATIENT)
Dept: SCHEDULING | Facility: HOME HEALTH | Age: 83
End: 2023-02-10
Payer: MEDICARE

## 2023-02-10 VITALS
RESPIRATION RATE: 17 BRPM | TEMPERATURE: 97.8 F | HEART RATE: 70 BPM | SYSTOLIC BLOOD PRESSURE: 118 MMHG | OXYGEN SATURATION: 98 % | DIASTOLIC BLOOD PRESSURE: 70 MMHG

## 2023-02-10 PROCEDURE — G0299 HHS/HOSPICE OF RN EA 15 MIN: HCPCS

## 2023-02-10 ASSESSMENT — ENCOUNTER SYMPTOMS: HEMOPTYSIS: 0

## 2023-02-13 ENCOUNTER — TELEPHONE (OUTPATIENT)
Dept: PULMONOLOGY | Age: 83
End: 2023-02-13

## 2023-02-13 DIAGNOSIS — I50.22 CHRONIC SYSTOLIC CONGESTIVE HEART FAILURE (HCC): Chronic | ICD-10-CM

## 2023-02-13 NOTE — TELEPHONE ENCOUNTER
----- Message from Florina Oates RN sent at 2/10/2023 11:15 AM EST -----    ----- Message -----  From: Skylar Zamora MD  Sent: 1/19/2023   7:56 AM EST  To: Haylie Bateman MA    Please let the patient know that her right pleural effusion appears improved and there is no severe pneumonia or lung mass. This is a good report.     Skylar Zamora MD

## 2023-02-14 ENCOUNTER — HOME CARE VISIT (OUTPATIENT)
Dept: SCHEDULING | Facility: HOME HEALTH | Age: 83
End: 2023-02-14
Payer: MEDICARE

## 2023-02-14 VITALS
OXYGEN SATURATION: 95 % | HEART RATE: 60 BPM | TEMPERATURE: 97.4 F | SYSTOLIC BLOOD PRESSURE: 120 MMHG | DIASTOLIC BLOOD PRESSURE: 70 MMHG | RESPIRATION RATE: 18 BRPM

## 2023-02-14 PROCEDURE — G0299 HHS/HOSPICE OF RN EA 15 MIN: HCPCS

## 2023-02-14 RX ORDER — TORSEMIDE 10 MG/1
10 TABLET ORAL DAILY
Qty: 30 TABLET | Refills: 3 | Status: SHIPPED | OUTPATIENT
Start: 2023-02-14

## 2023-02-14 ASSESSMENT — ENCOUNTER SYMPTOMS: HEMOPTYSIS: 0

## 2023-02-14 NOTE — TELEPHONE ENCOUNTER
Prescription sent to pharmacy//[facility]ab  Requested Prescriptions     Signed Prescriptions Disp Refills    torsemide (DEMADEX) 10 MG tablet 30 tablet 3     Sig: Take 1 tablet by mouth daily     Authorizing Provider: Vicente Hatfield     Ordering User: Sylvie Thompson

## 2023-02-18 ENCOUNTER — HOME CARE VISIT (OUTPATIENT)
Dept: HOME HEALTH SERVICES | Facility: HOME HEALTH | Age: 83
End: 2023-02-18
Payer: MEDICARE

## 2023-02-18 ENCOUNTER — HOME CARE VISIT (OUTPATIENT)
Dept: SCHEDULING | Facility: HOME HEALTH | Age: 83
End: 2023-02-18
Payer: MEDICARE

## 2023-02-18 PROCEDURE — G0299 HHS/HOSPICE OF RN EA 15 MIN: HCPCS

## 2023-02-19 VITALS
DIASTOLIC BLOOD PRESSURE: 80 MMHG | HEART RATE: 72 BPM | TEMPERATURE: 98.2 F | SYSTOLIC BLOOD PRESSURE: 122 MMHG | OXYGEN SATURATION: 97 % | RESPIRATION RATE: 16 BRPM

## 2023-02-19 ASSESSMENT — ENCOUNTER SYMPTOMS
STOOL DESCRIPTION: SOFT FORMED
PAIN LOCATION - PAIN QUALITY: ACHING

## 2023-02-21 ENCOUNTER — HOME CARE VISIT (OUTPATIENT)
Dept: HOME HEALTH SERVICES | Facility: HOME HEALTH | Age: 83
End: 2023-02-21
Payer: MEDICARE

## 2023-02-21 ENCOUNTER — HOME CARE VISIT (OUTPATIENT)
Dept: SCHEDULING | Facility: HOME HEALTH | Age: 83
End: 2023-02-21
Payer: MEDICARE

## 2023-02-21 VITALS
SYSTOLIC BLOOD PRESSURE: 118 MMHG | HEART RATE: 73 BPM | DIASTOLIC BLOOD PRESSURE: 70 MMHG | TEMPERATURE: 97.7 F | OXYGEN SATURATION: 96 % | RESPIRATION RATE: 17 BRPM

## 2023-02-21 PROCEDURE — G0299 HHS/HOSPICE OF RN EA 15 MIN: HCPCS

## 2023-02-21 ASSESSMENT — ENCOUNTER SYMPTOMS: HEMOPTYSIS: 0

## 2023-02-25 ENCOUNTER — HOME CARE VISIT (OUTPATIENT)
Dept: SCHEDULING | Facility: HOME HEALTH | Age: 83
End: 2023-02-25
Payer: MEDICARE

## 2023-02-25 VITALS
HEART RATE: 73 BPM | OXYGEN SATURATION: 96 % | DIASTOLIC BLOOD PRESSURE: 64 MMHG | TEMPERATURE: 98.6 F | SYSTOLIC BLOOD PRESSURE: 120 MMHG | RESPIRATION RATE: 18 BRPM

## 2023-02-25 PROCEDURE — G0299 HHS/HOSPICE OF RN EA 15 MIN: HCPCS

## 2023-02-25 ASSESSMENT — ENCOUNTER SYMPTOMS: HEMOPTYSIS: 0

## 2023-02-27 ENCOUNTER — HOME CARE VISIT (OUTPATIENT)
Dept: SCHEDULING | Facility: HOME HEALTH | Age: 83
End: 2023-02-27
Payer: MEDICARE

## 2023-02-27 VITALS
SYSTOLIC BLOOD PRESSURE: 110 MMHG | OXYGEN SATURATION: 96 % | TEMPERATURE: 97.6 F | HEART RATE: 62 BPM | RESPIRATION RATE: 17 BRPM | DIASTOLIC BLOOD PRESSURE: 64 MMHG

## 2023-02-27 PROCEDURE — G0299 HHS/HOSPICE OF RN EA 15 MIN: HCPCS

## 2023-02-27 ASSESSMENT — ENCOUNTER SYMPTOMS: HEMOPTYSIS: 0

## 2023-03-01 ENCOUNTER — HOME CARE VISIT (OUTPATIENT)
Dept: SCHEDULING | Facility: HOME HEALTH | Age: 83
End: 2023-03-01
Payer: MEDICARE

## 2023-03-01 VITALS
OXYGEN SATURATION: 98 % | HEART RATE: 71 BPM | DIASTOLIC BLOOD PRESSURE: 64 MMHG | SYSTOLIC BLOOD PRESSURE: 120 MMHG | RESPIRATION RATE: 18 BRPM | TEMPERATURE: 96.8 F

## 2023-03-01 PROCEDURE — G0299 HHS/HOSPICE OF RN EA 15 MIN: HCPCS

## 2023-03-01 ASSESSMENT — ENCOUNTER SYMPTOMS: HEMOPTYSIS: 0

## 2023-03-02 ENCOUNTER — OFFICE VISIT (OUTPATIENT)
Dept: CARDIOLOGY CLINIC | Age: 83
End: 2023-03-02
Payer: MEDICARE

## 2023-03-02 VITALS
HEART RATE: 69 BPM | WEIGHT: 101 LBS | DIASTOLIC BLOOD PRESSURE: 71 MMHG | BODY MASS INDEX: 18.58 KG/M2 | SYSTOLIC BLOOD PRESSURE: 118 MMHG | HEIGHT: 62 IN

## 2023-03-02 DIAGNOSIS — I48.19 PERSISTENT ATRIAL FIBRILLATION (HCC): Chronic | ICD-10-CM

## 2023-03-02 DIAGNOSIS — N18.32 STAGE 3B CHRONIC KIDNEY DISEASE (HCC): Chronic | ICD-10-CM

## 2023-03-02 DIAGNOSIS — I50.22 CHRONIC SYSTOLIC CONGESTIVE HEART FAILURE (HCC): Primary | Chronic | ICD-10-CM

## 2023-03-02 PROCEDURE — 1123F ACP DISCUSS/DSCN MKR DOCD: CPT | Performed by: INTERNAL MEDICINE

## 2023-03-02 PROCEDURE — G8484 FLU IMMUNIZE NO ADMIN: HCPCS | Performed by: INTERNAL MEDICINE

## 2023-03-02 PROCEDURE — G8400 PT W/DXA NO RESULTS DOC: HCPCS | Performed by: INTERNAL MEDICINE

## 2023-03-02 PROCEDURE — 99214 OFFICE O/P EST MOD 30 MIN: CPT | Performed by: INTERNAL MEDICINE

## 2023-03-02 PROCEDURE — 1036F TOBACCO NON-USER: CPT | Performed by: INTERNAL MEDICINE

## 2023-03-02 PROCEDURE — G8428 CUR MEDS NOT DOCUMENT: HCPCS | Performed by: INTERNAL MEDICINE

## 2023-03-02 PROCEDURE — G8419 CALC BMI OUT NRM PARAM NOF/U: HCPCS | Performed by: INTERNAL MEDICINE

## 2023-03-02 PROCEDURE — 1090F PRES/ABSN URINE INCON ASSESS: CPT | Performed by: INTERNAL MEDICINE

## 2023-03-02 ASSESSMENT — ENCOUNTER SYMPTOMS: SHORTNESS OF BREATH: 0

## 2023-03-02 NOTE — PROGRESS NOTES
[address] [place], [place]  [address], [address] [facility], [address] [place] [address]  PHONE: [phone]    Ke Kulkarni  1940      SUBJECTIVE:   Ke Kulkarni is a 79 y.o. female seen for a follow up visit regarding the following:     Chief Complaint   Patient presents with    Congestive Heart Failure       HPI:    Patient presents for follow-up. She has fortunately done fairly well since her last visit. Her Demadex was decreased to 10 mg and appears that her edema has remained stable. Only minimal edema. Heart rate controlled with Lanoxin and Toprol. She is compliant with her aspirin but has refused anticoagulation. Patient and her family understand the risk of CVA. Past Medical History, Past Surgical History, Family history, Social History, and Medications were all reviewed with the patient today and updated as necessary.            Current Outpatient Medications:     torsemide (DEMADEX) 10 MG tablet, Take 1 tablet by mouth daily, Disp: 30 tablet, Rfl: 3    empagliflozin (JARDIANCE) 10 MG tablet, Take 1 tablet by mouth daily, Disp: 90 tablet, Rfl: 3    metoprolol succinate (TOPROL XL) 25 MG extended release tablet, Take 1 tablet by mouth in the morning and at bedtime, Disp: 180 tablet, Rfl: 3    digoxin (LANOXIN) 125 MCG tablet, Take 1 tablet by mouth daily, Disp: 90 tablet, Rfl: 3    aspirin 81 MG chewable tablet, Take 1 tablet by mouth daily, Disp: 30 tablet, Rfl: 0     No Known Allergies     Patient Active Problem List    Diagnosis Date Noted    Stage 3b chronic kidney disease ([facility] [address] 01/09/2023     Priority: Medium    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 01/09/2023     Priority: Medium    Benign head tremor 01/09/2023     Priority: Medium    Refused pneumococcal vaccine 01/09/2023     Priority: Medium    Refused influenza vaccine 01/09/2023     Priority: Medium    COVID-19 vaccination refused 01/09/2023     Priority: Medium    Refused varicella vaccine 01/09/2023 Priority: Medium    Refuses tetanus, diphtheria, and acellular pertussis (Tdap) vaccination 01/09/2023     Priority: Medium    High risk medication use 01/09/2023     Priority: Medium    Witnessed episode of apnea 01/09/2023     Priority: Medium    Persistent atrial fibrillation ([facility] [address] 12/28/2022     Priority: Medium    Pleural effusion 12/28/2022     Priority: Medium    Chronic systolic congestive heart failure ([address] 12/28/2022     Priority: Medium     1.  Echo (12/29/22):  EF 15-20%. Severe GHK. RV dysfunction. Severe TR/MR. RVSP 47.    2.   Echo (2/16/2023): EF 25-30%. Severe global hypokinesis. Moderate MR. Moderate/severe TR. Moderate biatrial enlargement. Social History     Tobacco Use    Smoking status: Never     Passive exposure: Never    Smokeless tobacco: Never   Substance Use Topics    Alcohol use: Yes     Comment: occ       ROS:    Review of Systems   Constitutional: Positive for malaise/fatigue. Cardiovascular:  Negative for chest pain. Respiratory:  Negative for shortness of breath. Musculoskeletal:  Positive for arthritis. Neurological:  Positive for tremors. Negative for focal weakness. PHYSICAL EXAM:  Wt Readings from Last 3 Encounters:   03/02/23 101 lb (45.8 kg)   02/16/23 103 lb (46.7 kg)   01/30/23 103 lb 3.2 oz (46.8 kg)     BP Readings from Last 3 Encounters:   03/02/23 118/71   03/01/23 120/64   02/27/23 110/64     Pulse Readings from Last 3 Encounters:   03/02/23 69   03/01/23 71   02/27/23 62       Physical Exam  Constitutional:       General: She is not in acute distress. Neck:      Vascular: No carotid bruit. Cardiovascular:      Rate and Rhythm: Normal rate. Rhythm irregular. Pulmonary:      Effort: No respiratory distress. Breath sounds: Normal breath sounds. Abdominal:      General: Bowel sounds are normal.      Palpations: Abdomen is soft. Musculoskeletal:         General: Swelling (TRivial edema) present.    Skin:     General: Skin is warm and dry. Neurological:      General: No focal deficit present. Psychiatric:         Mood and Affect: Mood normal.       Medical problems and test results were reviewed with the patient today. Lab Results   Component Value Date    WBC 16.1 (H) 01/19/2023    HGB 17.0 (H) 01/19/2023    HCT 51.9 (H) 01/19/2023    MCV 96.8 01/19/2023     (H) 01/19/2023       Lab Results   Component Value Date/Time     02/02/2023 02:25 PM    K 5.4 02/02/2023 02:25 PM     02/02/2023 02:25 PM    CO2 30 02/02/2023 02:25 PM    BUN 17 02/02/2023 02:25 PM    CREATININE 1.20 02/02/2023 02:25 PM    GLUCOSE 81 02/02/2023 02:25 PM    CALCIUM 9.3 02/02/2023 02:25 PM        No results found for: CHOL  No results found for: TRIG  No results found for: HDL  No results found for: LDLCHOLESTEROL, LDLCALC  No results found for: LABVLDL, VLDL  No results found for: CHOLHDLRATIO     Data from outside records/labs from outside providers have been reviewed and summarized as noted in the HPI, past history and data review sections of this note       ASSESSMENT and PLAN      1. Chronic systolic congestive heart failure (HCC)  Stent severe LV dysfunction. Fortunately, she has remained stable from a volume perspective. Continue Demadex 10 mg a day. Continue Toprol and Jardiance. Blood pressure limits addition of Entresto/ACE inhibitor/Aldactone. 2. Persistent atrial fibrillation (HCC)  Rate control with Toprol and digoxin. She has declined anticoagulation. Understands risk of CVA. 3. Stage 3b chronic kidney disease ([facility] [address]  Renal function improved with decreasing Demadex to 10 mg a day. Creatinine decreased from 1.4-1.2. Plans to have labs upcoming with PCP. If not performed prior to her next visit we will check when she comes back to the office. Return in about 3 months (around 6/2/2023).        Rena Eaton MD  3/2/2023  3:06 PM    This note may have been dictated using speech recognition software.   As a result, error of speech recognition may have occurred

## 2023-03-02 NOTE — CASE COMMUNICATION
Patient being discharged from home health this visit. Patient was seen by SN for 18 visits. SN educated and assessed patient on/for: bowel management, cognitive and emotional assessment, s/s of infection, pain management, skin integrity, weight management, assessment of vs, medication education, fall safety and disease management. Patient seen by PT for 6 visits for strengthen exercises, balance and ambulation. Patient also had a v isit by [employer].

## 2023-04-06 ENCOUNTER — NURSE TRIAGE (OUTPATIENT)
Dept: OTHER | Facility: CLINIC | Age: 83
End: 2023-04-06

## 2023-04-06 NOTE — TELEPHONE ENCOUNTER
Location of patient: [place]    Received call from [facility] at [email or web address] with [employer]. Subjective: Caller states \"right elbow injury from a fall\"     Current Symptoms: right ebow pain and swelling golf ball size, can move it daughter calling for her as her mom not complaining much and says looks like blood filled (hematoma) hx afib chf and crf and does take a baby asa, small amt of bleeding    Onset: yesterday    Associated Symptoms: NA    Pain Severity: denies    Temperature: none       What has been tried: ice    LMP: NA Pregnant: NA    Recommended disposition: See in Office Today    Care advice provided, patient verbalizes understanding; denies any other questions or concerns; instructed to call back for any new or worsening symptoms. Patient/Caller agrees with recommended disposition; writer provided warm transfer to jami at [email or web address] for appointment scheduling    Attention Provider: Thank you for allowing me to participate in the care of your patient. The patient was connected to triage in response to information provided to the ECC/PSC. Please do not respond through this encounter as the response is not directed to a shared pool.       Reason for Disposition   Large swelling or bruise and size > palm of person's hand    Protocols used: Elbow Injury-ADULT-[place]

## 2023-04-08 SDOH — ECONOMIC STABILITY: INCOME INSECURITY: HOW HARD IS IT FOR YOU TO PAY FOR THE VERY BASICS LIKE FOOD, HOUSING, MEDICAL CARE, AND HEATING?: VERY HARD

## 2023-04-08 SDOH — ECONOMIC STABILITY: FOOD INSECURITY: WITHIN THE PAST 12 MONTHS, THE FOOD YOU BOUGHT JUST DIDN'T LAST AND YOU DIDN'T HAVE MONEY TO GET MORE.: PATIENT DECLINED

## 2023-04-08 SDOH — ECONOMIC STABILITY: TRANSPORTATION INSECURITY
IN THE PAST 12 MONTHS, HAS LACK OF TRANSPORTATION KEPT YOU FROM MEETINGS, WORK, OR FROM GETTING THINGS NEEDED FOR DAILY LIVING?: NO

## 2023-04-08 SDOH — ECONOMIC STABILITY: FOOD INSECURITY: WITHIN THE PAST 12 MONTHS, YOU WORRIED THAT YOUR FOOD WOULD RUN OUT BEFORE YOU GOT MONEY TO BUY MORE.: SOMETIMES TRUE

## 2023-04-08 SDOH — ECONOMIC STABILITY: HOUSING INSECURITY
IN THE LAST 12 MONTHS, WAS THERE A TIME WHEN YOU DID NOT HAVE A STEADY PLACE TO SLEEP OR SLEPT IN A SHELTER (INCLUDING NOW)?: NO

## 2023-04-10 ENCOUNTER — OFFICE VISIT (OUTPATIENT)
Dept: FAMILY MEDICINE CLINIC | Facility: CLINIC | Age: 83
End: 2023-04-10
Payer: MEDICARE

## 2023-04-10 VITALS
OXYGEN SATURATION: 98 % | HEIGHT: 62 IN | SYSTOLIC BLOOD PRESSURE: 108 MMHG | DIASTOLIC BLOOD PRESSURE: 60 MMHG | HEART RATE: 72 BPM | BODY MASS INDEX: 19.88 KG/M2 | WEIGHT: 108 LBS

## 2023-04-10 DIAGNOSIS — I50.22 CHRONIC SYSTOLIC CONGESTIVE HEART FAILURE (HCC): Primary | Chronic | ICD-10-CM

## 2023-04-10 DIAGNOSIS — R23.3 EASY BRUISING: ICD-10-CM

## 2023-04-10 DIAGNOSIS — I48.19 PERSISTENT ATRIAL FIBRILLATION (HCC): Chronic | ICD-10-CM

## 2023-04-10 DIAGNOSIS — Z79.899 ENCOUNTER FOR MONITORING DIGOXIN THERAPY: ICD-10-CM

## 2023-04-10 DIAGNOSIS — Z51.81 ENCOUNTER FOR MONITORING DIGOXIN THERAPY: ICD-10-CM

## 2023-04-10 LAB
BASOPHILS # BLD: 0.2 K/UL (ref 0–0.2)
BASOPHILS NFR BLD: 1 % (ref 0–2)
DIFFERENTIAL METHOD BLD: ABNORMAL
DIGOXIN SERPL-MCNC: 1.3 NG/ML (ref 0.9–2.1)
EOSINOPHIL # BLD: 0.2 K/UL (ref 0–0.8)
EOSINOPHIL NFR BLD: 1 % (ref 0.5–7.8)
ERYTHROCYTE [DISTWIDTH] IN BLOOD BY AUTOMATED COUNT: 20.2 % (ref 11.9–14.6)
HCT VFR BLD AUTO: 52.9 % (ref 35.8–46.3)
HGB BLD-MCNC: 16.9 G/DL (ref 11.7–15.4)
IMM GRANULOCYTES # BLD AUTO: 0.1 K/UL (ref 0–0.5)
IMM GRANULOCYTES NFR BLD AUTO: 0 % (ref 0–5)
LYMPHOCYTES # BLD: 3.3 K/UL (ref 0.5–4.6)
LYMPHOCYTES NFR BLD: 22 % (ref 13–44)
MCH RBC QN AUTO: 30.1 PG (ref 26.1–32.9)
MCHC RBC AUTO-ENTMCNC: 31.9 G/DL (ref 31.4–35)
MCV RBC AUTO: 94.3 FL (ref 82–102)
MONOCYTES # BLD: 1.6 K/UL (ref 0.1–1.3)
MONOCYTES NFR BLD: 10 % (ref 4–12)
NEUTS SEG # BLD: 9.9 K/UL (ref 1.7–8.2)
NEUTS SEG NFR BLD: 65 % (ref 43–78)
NRBC # BLD: 0 K/UL (ref 0–0.2)
PLATELET # BLD AUTO: 847 K/UL (ref 150–450)
PMV BLD AUTO: 11.1 FL (ref 9.4–12.3)
RBC # BLD AUTO: 5.61 M/UL (ref 4.05–5.2)
WBC # BLD AUTO: 15.2 K/UL (ref 4.3–11.1)

## 2023-04-10 PROCEDURE — G8428 CUR MEDS NOT DOCUMENT: HCPCS | Performed by: FAMILY MEDICINE

## 2023-04-10 PROCEDURE — 99215 OFFICE O/P EST HI 40 MIN: CPT | Performed by: FAMILY MEDICINE

## 2023-04-10 PROCEDURE — 1123F ACP DISCUSS/DSCN MKR DOCD: CPT | Performed by: FAMILY MEDICINE

## 2023-04-10 PROCEDURE — 1036F TOBACCO NON-USER: CPT | Performed by: FAMILY MEDICINE

## 2023-04-10 PROCEDURE — G8400 PT W/DXA NO RESULTS DOC: HCPCS | Performed by: FAMILY MEDICINE

## 2023-04-10 PROCEDURE — G8420 CALC BMI NORM PARAMETERS: HCPCS | Performed by: FAMILY MEDICINE

## 2023-04-10 PROCEDURE — 1090F PRES/ABSN URINE INCON ASSESS: CPT | Performed by: FAMILY MEDICINE

## 2023-04-10 RX ORDER — TORSEMIDE 10 MG/1
10 TABLET ORAL DAILY
Qty: 90 TABLET | Refills: 0 | Status: SHIPPED | OUTPATIENT
Start: 2023-04-10

## 2023-04-18 ENCOUNTER — TELEMEDICINE (OUTPATIENT)
Dept: FAMILY MEDICINE CLINIC | Facility: CLINIC | Age: 83
End: 2023-04-18
Payer: MEDICARE

## 2023-04-18 DIAGNOSIS — Z00.00 MEDICARE ANNUAL WELLNESS VISIT, SUBSEQUENT: Primary | ICD-10-CM

## 2023-04-18 DIAGNOSIS — D75.1 POLYCYTHEMIA: ICD-10-CM

## 2023-04-18 PROCEDURE — 1123F ACP DISCUSS/DSCN MKR DOCD: CPT | Performed by: FAMILY MEDICINE

## 2023-04-18 PROCEDURE — G0439 PPPS, SUBSEQ VISIT: HCPCS | Performed by: FAMILY MEDICINE

## 2023-04-18 ASSESSMENT — PATIENT HEALTH QUESTIONNAIRE - PHQ9
SUM OF ALL RESPONSES TO PHQ9 QUESTIONS 1 & 2: 1
SUM OF ALL RESPONSES TO PHQ QUESTIONS 1-9: 1
1. LITTLE INTEREST OR PLEASURE IN DOING THINGS: 0
2. FEELING DOWN, DEPRESSED OR HOPELESS: 1

## 2023-04-18 ASSESSMENT — LIFESTYLE VARIABLES
HOW MANY STANDARD DRINKS CONTAINING ALCOHOL DO YOU HAVE ON A TYPICAL DAY: 1 OR 2
HOW OFTEN DO YOU HAVE A DRINK CONTAINING ALCOHOL: MONTHLY OR LESS

## 2023-04-18 NOTE — PATIENT INSTRUCTIONS
build up. You may hurt others--and yourself--emotionally and even physically. Violent behavior often starts with verbal threats or fairly minor incidents. But over time, it can involve physical harm. It can include physical, verbal, or sexual abuse of an intimate partner (domestic violence), a child (child abuse), or an older adult (elder abuse). It can also make you sick. Anger and constant hostility keep your blood pressure high. They increase your chances of having another health problem, such as depression, a heart attack, or a stroke. Some people with post-traumatic stress disorder (PTSD) feel angry and on alert all the time. It may feel like there are no other ways to react when you are angry. But when you learn to work with anger in appropriate and healthy ways, your anger no longer controls you. How can you manage your anger? The first step to managing anger is to be more aware of it. Note the thoughts, feelings, and emotions that you have when you get angry. Practice noticing these signs of anger when you are calm. If you are more aware of the signs of anger, you can take steps to manage it. Here are a few tips: Think before you act. Take time to stop and cool down when you feel yourself getting angry. Count to 10 while you take slow, steady breaths. Practice some other form of mental relaxation. Learn the feelings that lead to angry outbursts. Anger and hostility may be a symptom of unhappy feelings or depression about your job, your relationship, or other aspects of your personal life. Avoid situations that lead to angry outbursts. If standing in line bothers you, do errands at less busy times. Express anger in a healthy way. You might:  Go for a short walk or jog. Draw, paint, or listen to music to release the anger. Write in a daily journal.  Use \"I\" statements, not \"you\" statements, to discuss your anger.  Say \"I don't feel valued when my needs are not being met\" instead of \"You make me mad

## 2023-04-18 NOTE — PROGRESS NOTES
Medicare Annual Wellness Visit    [facility] [place] is here for Medicare AWV    Assessment & Plan   Medicare annual wellness visit, subsequent  Polycythemia  -     [address] [facility]      Recommendations for Preventive Services Due: see orders and patient instructions/AVS.  Recommended screening schedule for the next 5-10 years is provided to the patient in written form: see Patient Instructions/AVS.     No follow-ups on file. Subjective       Patient's complete Health Risk Assessment and screening values have been reviewed and are found in Flowsheets. The following problems were reviewed today and where indicated follow up appointments were made and/or referrals ordered. Positive Risk Factor Screenings with Interventions:    Fall Risk:  Do you feel unsteady or are you worried about falling? : (!) yes  2 or more falls in past year?: no  Fall with injury in past year?: no     Interventions:    See AVS for additional education material            General HRA Questions:  Select all that apply: (!) Anger    Anger Interventions:  See AVS for additional education material        Dentist Screen:  Have you seen the dentist within the past year?: (!) No    Intervention:  See AVS for additional education material        Advanced Directives:  Do you have a Living Will?: (!) No    Intervention:                         Objective      Patient-Reported Vitals  No data recorded            No Known Allergies  Prior to Visit Medications    Medication Sig Taking?  Authorizing Provider   torsemide (DEMADEX) 10 MG tablet Take 1 tablet by mouth daily Yes Suzie Brown DO   empagliflozin (JARDIANCE) 10 MG tablet Take 1 tablet by mouth daily Yes Diane Trevino MD   metoprolol succinate (TOPROL XL) 25 MG extended release tablet Take 1 tablet by mouth in the morning and at bedtime Yes Diane Trevino MD   digoxin (LANOXIN) 125 MCG tablet Take 1 tablet by mouth daily Yes Diane Trevino MD

## 2023-04-18 NOTE — PROGRESS NOTES
Medicare Annual Wellness Visit    Dillon Beltran is here for No chief complaint on file. Assessment & Plan   Medicare annual wellness visit, subsequent      Recommendations for Preventive Services Due: see orders and patient instructions/AVS.  Recommended screening schedule for the next 5-10 years is provided to the patient in written form: see Patient Instructions/AVS.     No follow-ups on file. Subjective   {OPTIONAL - WILL AUTO-DELETE IF NOT IUQJ:[id number]}    Patient's complete Health Risk Assessment and screening values have been reviewed and are found in Flowsheets. The following problems were reviewed today and where indicated follow up appointments were made and/or referrals ordered. Positive Risk Factor Screenings with Interventions:                         {OPTIONAL- LDCT, CVD, STI Counseling Statements:[id number]}              Objective      Patient-Reported Vitals  No data recorded     {OPTIONAL - GENERAL PHYSICAL EXAM (WILL AUTO-DELETE IF NOT USED):[id number]}       No Known Allergies  Prior to Visit Medications    Medication Sig Taking?  Authorizing Provider   torsemide (DEMADEX) 10 MG tablet Take 1 tablet by mouth daily  Valeriano Colbert DO   empagliflozin (JARDIANCE) 10 MG tablet Take 1 tablet by mouth daily  Roger Arevalo MD   metoprolol succinate (TOPROL XL) 25 MG extended release tablet Take 1 tablet by mouth in the morning and at bedtime  Roger Arevalo MD   digoxin (LANOXIN) 125 MCG tablet Take 1 tablet by mouth daily  Roger Arevalo MD   aspirin 81 MG chewable tablet Take 1 tablet by mouth daily  TATYANA Hoskins - CNP       CareTeam (Including outside providers/suppliers regularly involved in providing care):   Patient Care Team:  Valeriano Colbert DO as PCP - General (Family Medicine)  Valeriano Colbert DO as PCP - Empaneled Provider     Reviewed and updated this visit:  Allergies  Meds  Problems              Valeriano Colbert DO

## 2023-04-23 DIAGNOSIS — I50.22 CHRONIC SYSTOLIC CONGESTIVE HEART FAILURE (HCC): Chronic | ICD-10-CM

## 2023-04-23 DIAGNOSIS — I48.19 PERSISTENT ATRIAL FIBRILLATION (HCC): ICD-10-CM

## 2023-04-24 RX ORDER — TORSEMIDE 10 MG/1
10 TABLET ORAL DAILY
Qty: 90 TABLET | Refills: 0 | OUTPATIENT
Start: 2023-04-24

## 2023-04-24 RX ORDER — METOPROLOL SUCCINATE 25 MG/1
25 TABLET, EXTENDED RELEASE ORAL 2 TIMES DAILY
Qty: 180 TABLET | Refills: 3 | Status: SHIPPED | OUTPATIENT
Start: 2023-04-24 | End: 2023-05-24

## 2023-04-24 RX ORDER — DIGOXIN 125 MCG
125 TABLET ORAL DAILY
Qty: 90 TABLET | Refills: 3 | Status: SHIPPED | OUTPATIENT
Start: 2023-04-24 | End: 2023-05-24

## 2023-04-25 PROBLEM — R23.3 EASY BRUISING: Status: ACTIVE | Noted: 2023-04-25

## 2023-04-25 PROBLEM — Z51.81 ENCOUNTER FOR MONITORING DIGOXIN THERAPY: Status: ACTIVE | Noted: 2023-04-25

## 2023-04-25 PROBLEM — Z79.899 ENCOUNTER FOR MONITORING DIGOXIN THERAPY: Status: ACTIVE | Noted: 2023-04-25

## 2023-04-25 ASSESSMENT — ENCOUNTER SYMPTOMS
DIARRHEA: 0
SHORTNESS OF BREATH: 0
CONSTIPATION: 0
ABDOMINAL PAIN: 0
WHEEZING: 0
BACK PAIN: 0
EYE PAIN: 0
SORE THROAT: 0
COUGH: 0
VOMITING: 0

## 2023-06-28 ENCOUNTER — OFFICE VISIT (OUTPATIENT)
Age: 83
End: 2023-06-28
Payer: MEDICARE

## 2023-06-28 ENCOUNTER — NURSE ONLY (OUTPATIENT)
Dept: FAMILY MEDICINE CLINIC | Facility: CLINIC | Age: 83
End: 2023-06-28

## 2023-06-28 VITALS
HEIGHT: 62 IN | HEART RATE: 68 BPM | WEIGHT: 115.8 LBS | SYSTOLIC BLOOD PRESSURE: 122 MMHG | DIASTOLIC BLOOD PRESSURE: 62 MMHG | BODY MASS INDEX: 21.31 KG/M2

## 2023-06-28 DIAGNOSIS — N18.32 STAGE 3B CHRONIC KIDNEY DISEASE (HCC): Chronic | ICD-10-CM

## 2023-06-28 DIAGNOSIS — I48.19 PERSISTENT ATRIAL FIBRILLATION (HCC): Chronic | ICD-10-CM

## 2023-06-28 DIAGNOSIS — I50.22 CHRONIC SYSTOLIC CONGESTIVE HEART FAILURE (HCC): Primary | Chronic | ICD-10-CM

## 2023-06-28 DIAGNOSIS — I50.22 CHRONIC SYSTOLIC CONGESTIVE HEART FAILURE (HCC): Chronic | ICD-10-CM

## 2023-06-28 LAB
ERYTHROCYTE [DISTWIDTH] IN BLOOD BY AUTOMATED COUNT: 18.4 % (ref 11.9–14.6)
HCT VFR BLD AUTO: 56.8 % (ref 35.8–46.3)
HGB BLD-MCNC: 17.4 G/DL (ref 11.7–15.4)
MCH RBC QN AUTO: 28.9 PG (ref 26.1–32.9)
MCHC RBC AUTO-ENTMCNC: 30.6 G/DL (ref 31.4–35)
MCV RBC AUTO: 94.4 FL (ref 82–102)
NRBC # BLD: 0 K/UL (ref 0–0.2)
PLATELET # BLD AUTO: 651 K/UL (ref 150–450)
PMV BLD AUTO: 11.4 FL (ref 9.4–12.3)
RBC # BLD AUTO: 6.02 M/UL (ref 4.05–5.2)
WBC # BLD AUTO: 15.7 K/UL (ref 4.3–11.1)

## 2023-06-28 PROCEDURE — 1036F TOBACCO NON-USER: CPT | Performed by: INTERNAL MEDICINE

## 2023-06-28 PROCEDURE — G8400 PT W/DXA NO RESULTS DOC: HCPCS | Performed by: INTERNAL MEDICINE

## 2023-06-28 PROCEDURE — G8428 CUR MEDS NOT DOCUMENT: HCPCS | Performed by: INTERNAL MEDICINE

## 2023-06-28 PROCEDURE — 1090F PRES/ABSN URINE INCON ASSESS: CPT | Performed by: INTERNAL MEDICINE

## 2023-06-28 PROCEDURE — 1123F ACP DISCUSS/DSCN MKR DOCD: CPT | Performed by: INTERNAL MEDICINE

## 2023-06-28 PROCEDURE — 99214 OFFICE O/P EST MOD 30 MIN: CPT | Performed by: INTERNAL MEDICINE

## 2023-06-28 PROCEDURE — G8420 CALC BMI NORM PARAMETERS: HCPCS | Performed by: INTERNAL MEDICINE

## 2023-06-28 ASSESSMENT — ENCOUNTER SYMPTOMS: SHORTNESS OF BREATH: 0

## 2023-06-29 ENCOUNTER — TELEPHONE (OUTPATIENT)
Age: 83
End: 2023-06-29

## 2023-06-29 LAB
ANION GAP SERPL CALC-SCNC: 7 MMOL/L (ref 2–11)
BUN SERPL-MCNC: 17 MG/DL (ref 8–23)
CALCIUM SERPL-MCNC: 9.7 MG/DL (ref 8.3–10.4)
CHLORIDE SERPL-SCNC: 103 MMOL/L (ref 101–110)
CO2 SERPL-SCNC: 29 MMOL/L (ref 21–32)
CREAT SERPL-MCNC: 1.5 MG/DL (ref 0.6–1)
GLUCOSE SERPL-MCNC: 78 MG/DL (ref 65–100)
POTASSIUM SERPL-SCNC: 5 MMOL/L (ref 3.5–5.1)
SODIUM SERPL-SCNC: 139 MMOL/L (ref 133–143)

## 2023-10-06 DIAGNOSIS — I50.22 CHRONIC SYSTOLIC CONGESTIVE HEART FAILURE (HCC): Chronic | ICD-10-CM

## 2023-10-06 RX ORDER — TORSEMIDE 10 MG/1
10 TABLET ORAL DAILY
Qty: 90 TABLET | Refills: 0 | OUTPATIENT
Start: 2023-10-06

## 2023-10-25 ASSESSMENT — ENCOUNTER SYMPTOMS: SHORTNESS OF BREATH: 0

## 2023-10-26 ENCOUNTER — OFFICE VISIT (OUTPATIENT)
Age: 83
End: 2023-10-26
Payer: MEDICARE

## 2023-10-26 VITALS
SYSTOLIC BLOOD PRESSURE: 135 MMHG | DIASTOLIC BLOOD PRESSURE: 77 MMHG | WEIGHT: 119 LBS | HEIGHT: 62 IN | HEART RATE: 69 BPM | BODY MASS INDEX: 21.9 KG/M2

## 2023-10-26 DIAGNOSIS — I48.19 PERSISTENT ATRIAL FIBRILLATION (HCC): Chronic | ICD-10-CM

## 2023-10-26 DIAGNOSIS — N18.32 STAGE 3B CHRONIC KIDNEY DISEASE (HCC): Chronic | ICD-10-CM

## 2023-10-26 DIAGNOSIS — I50.22 CHRONIC SYSTOLIC CONGESTIVE HEART FAILURE (HCC): Primary | Chronic | ICD-10-CM

## 2023-10-26 DIAGNOSIS — I50.22 CHRONIC SYSTOLIC CONGESTIVE HEART FAILURE (HCC): Chronic | ICD-10-CM

## 2023-10-26 PROCEDURE — 99214 OFFICE O/P EST MOD 30 MIN: CPT | Performed by: INTERNAL MEDICINE

## 2023-10-26 PROCEDURE — 1036F TOBACCO NON-USER: CPT | Performed by: INTERNAL MEDICINE

## 2023-10-26 PROCEDURE — G8427 DOCREV CUR MEDS BY ELIG CLIN: HCPCS | Performed by: INTERNAL MEDICINE

## 2023-10-26 PROCEDURE — G8400 PT W/DXA NO RESULTS DOC: HCPCS | Performed by: INTERNAL MEDICINE

## 2023-10-26 PROCEDURE — 1090F PRES/ABSN URINE INCON ASSESS: CPT | Performed by: INTERNAL MEDICINE

## 2023-10-26 PROCEDURE — 1123F ACP DISCUSS/DSCN MKR DOCD: CPT | Performed by: INTERNAL MEDICINE

## 2023-10-26 PROCEDURE — G8420 CALC BMI NORM PARAMETERS: HCPCS | Performed by: INTERNAL MEDICINE

## 2023-10-26 PROCEDURE — G8484 FLU IMMUNIZE NO ADMIN: HCPCS | Performed by: INTERNAL MEDICINE

## 2023-10-26 RX ORDER — METOPROLOL SUCCINATE 25 MG/1
25 TABLET, EXTENDED RELEASE ORAL 2 TIMES DAILY
Qty: 180 TABLET | Refills: 3 | Status: SHIPPED | OUTPATIENT
Start: 2023-10-26 | End: 2024-10-20

## 2023-10-26 RX ORDER — TORSEMIDE 10 MG/1
10 TABLET ORAL DAILY
Qty: 90 TABLET | Refills: 3 | Status: SHIPPED | OUTPATIENT
Start: 2023-10-26

## 2023-10-26 RX ORDER — DIGOXIN 125 MCG
125 TABLET ORAL DAILY
Qty: 90 TABLET | Refills: 3 | Status: SHIPPED | OUTPATIENT
Start: 2023-10-26 | End: 2024-10-20

## 2023-10-26 NOTE — PROGRESS NOTES
68   10/19/23 85   06/28/23 67       Physical Exam  Constitutional:       General: She is not in acute distress. Appearance: Normal appearance. HENT:      Mouth/Throat:      Mouth: Mucous membranes are moist.   Neck:      Vascular: No carotid bruit. Cardiovascular:      Rate and Rhythm: Normal rate and regular rhythm. Heart sounds: Murmur heard. Pulmonary:      Breath sounds: Normal breath sounds. No wheezing. Abdominal:      General: There is no distension. Palpations: Abdomen is soft. Musculoskeletal:         General: No swelling. Skin:     General: Skin is warm and dry. Neurological:      General: No focal deficit present. Psychiatric:         Mood and Affect: Mood normal.         Medical problems and test results were reviewed with the patient today. Lab Results   Component Value Date    WBC 15.7 (H) 06/28/2023    HGB 17.4 (H) 06/28/2023    HCT 56.8 (H) 06/28/2023    MCV 94.4 06/28/2023     (H) 06/28/2023       Lab Results   Component Value Date/Time     06/28/2023 03:43 PM    K 5.0 06/28/2023 03:43 PM     06/28/2023 03:43 PM    CO2 29 06/28/2023 03:43 PM    BUN 17 06/28/2023 03:43 PM    CREATININE 1.50 06/28/2023 03:43 PM    GLUCOSE 78 06/28/2023 03:43 PM    CALCIUM 9.7 06/28/2023 03:43 PM        No results found for: \"CHOL\"  No results found for: \"TRIG\"  No results found for: \"HDL\"  No results found for: \"LDLCHOLESTEROL\", \"LDLCALC\"  No results found for: \"LABVLDL\", \"VLDL\"  No results found for: \"CHOLHDLRATIO\"     Data from outside records/labs from outside providers have been reviewed and summarized as noted in the HPI, past history and data review sections of this note       ASSESSMENT and PLAN      1. Chronic systolic congestive heart failure ([address])  Patient appears well compensated with Toprol, digoxin and Demadex therapy. On Jardiance. Unable to tolerate ACE/ARB/Aldactone due to hypotension.  - empagliflozin (JARDIANCE) 10 MG tablet;  Take 1 tablet by

## 2023-12-05 ENCOUNTER — HOSPITAL ENCOUNTER (EMERGENCY)
Age: 83
Discharge: HOME | End: 2023-12-05
Attending: EMERGENCY MEDICINE
Payer: MEDICARE

## 2023-12-05 ENCOUNTER — APPOINTMENT (OUTPATIENT)
Dept: GENERAL RADIOLOGY | Age: 83
End: 2023-12-05
Payer: MEDICARE

## 2023-12-05 VITALS
HEIGHT: 62 IN | HEART RATE: 86 BPM | OXYGEN SATURATION: 95 % | TEMPERATURE: 98.7 F | SYSTOLIC BLOOD PRESSURE: 137 MMHG | BODY MASS INDEX: 19.69 KG/M2 | RESPIRATION RATE: 18 BRPM | DIASTOLIC BLOOD PRESSURE: 91 MMHG | WEIGHT: 107 LBS

## 2023-12-05 DIAGNOSIS — D72.829 LEUKOCYTOSIS, UNSPECIFIED TYPE: ICD-10-CM

## 2023-12-05 DIAGNOSIS — S70.02XA HEMATOMA OF LEFT HIP, INITIAL ENCOUNTER: Primary | ICD-10-CM

## 2023-12-05 DIAGNOSIS — N30.00 ACUTE CYSTITIS WITHOUT HEMATURIA: ICD-10-CM

## 2023-12-05 LAB
ANION GAP SERPL CALC-SCNC: 9 MMOL/L (ref 2–11)
APPEARANCE UR: CLEAR
BACTERIA URNS QL MICRO: ABNORMAL /HPF
BASOPHILS # BLD: 0.3 K/UL (ref 0–0.2)
BASOPHILS NFR BLD: 1 % (ref 0–2)
BILIRUB UR QL: NEGATIVE
BUN SERPL-MCNC: 13 MG/DL (ref 8–23)
CALCIUM SERPL-MCNC: 8.9 MG/DL (ref 8.3–10.4)
CASTS URNS QL MICRO: ABNORMAL /LPF
CHLORIDE SERPL-SCNC: 107 MMOL/L (ref 103–113)
CO2 SERPL-SCNC: 24 MMOL/L (ref 21–32)
COLOR UR: ABNORMAL
CREAT SERPL-MCNC: 1.44 MG/DL (ref 0.6–1)
DIFFERENTIAL METHOD BLD: ABNORMAL
EOSINOPHIL # BLD: 0.1 K/UL (ref 0–0.8)
EOSINOPHIL NFR BLD: 1 % (ref 0.5–7.8)
EPI CELLS #/AREA URNS HPF: ABNORMAL /HPF
ERYTHROCYTE [DISTWIDTH] IN BLOOD BY AUTOMATED COUNT: 18.7 % (ref 11.9–14.6)
GLUCOSE SERPL-MCNC: 106 MG/DL (ref 65–100)
GLUCOSE UR STRIP.AUTO-MCNC: 250 MG/DL
HCT VFR BLD AUTO: 56.7 % (ref 35.8–46.3)
HGB BLD-MCNC: 17.3 G/DL (ref 11.7–15.4)
HGB UR QL STRIP: NEGATIVE
IMM GRANULOCYTES # BLD AUTO: 0.4 K/UL (ref 0–0.5)
IMM GRANULOCYTES NFR BLD AUTO: 1 % (ref 0–5)
KETONES UR QL STRIP.AUTO: NEGATIVE MG/DL
LEUKOCYTE ESTERASE UR QL STRIP.AUTO: ABNORMAL
LYMPHOCYTES # BLD: 2.3 K/UL (ref 0.5–4.6)
LYMPHOCYTES NFR BLD: 8 % (ref 13–44)
MCH RBC QN AUTO: 27.3 PG (ref 26.1–32.9)
MCHC RBC AUTO-ENTMCNC: 30.5 G/DL (ref 31.4–35)
MCV RBC AUTO: 89.4 FL (ref 82–102)
MONOCYTES # BLD: 2 K/UL (ref 0.1–1.3)
MONOCYTES NFR BLD: 7 % (ref 4–12)
NEUTS SEG # BLD: 22.2 K/UL (ref 1.7–8.2)
NEUTS SEG NFR BLD: 82 % (ref 43–78)
NITRITE UR QL STRIP.AUTO: NEGATIVE
NRBC # BLD: 0 K/UL (ref 0–0.2)
PH UR STRIP: 5 (ref 5–9)
PLATELET # BLD AUTO: 591 K/UL (ref 150–450)
PMV BLD AUTO: 10.7 FL (ref 9.4–12.3)
POTASSIUM SERPL-SCNC: 3.7 MMOL/L (ref 3.5–5.1)
PROT UR STRIP-MCNC: NEGATIVE MG/DL
RBC # BLD AUTO: 6.34 M/UL (ref 4.05–5.2)
RBC #/AREA URNS HPF: ABNORMAL /HPF
SODIUM SERPL-SCNC: 140 MMOL/L (ref 136–146)
SP GR UR REFRACTOMETRY: 1.01 (ref 1–1.02)
UROBILINOGEN UR QL STRIP.AUTO: 0.2 EU/DL (ref 0.2–1)
WBC # BLD AUTO: 27.3 K/UL (ref 4.3–11.1)
WBC URNS QL MICRO: ABNORMAL /HPF

## 2023-12-05 PROCEDURE — 73502 X-RAY EXAM HIP UNI 2-3 VIEWS: CPT

## 2023-12-05 PROCEDURE — 99284 EMERGENCY DEPT VISIT MOD MDM: CPT

## 2023-12-05 PROCEDURE — 80048 BASIC METABOLIC PNL TOTAL CA: CPT

## 2023-12-05 PROCEDURE — 85025 COMPLETE CBC W/AUTO DIFF WBC: CPT

## 2023-12-05 PROCEDURE — 81001 URINALYSIS AUTO W/SCOPE: CPT

## 2023-12-05 RX ORDER — CEPHALEXIN 500 MG/1
500 CAPSULE ORAL 2 TIMES DAILY
Qty: 14 CAPSULE | Refills: 0 | Status: SHIPPED | OUTPATIENT
Start: 2023-12-05 | End: 2023-12-12

## 2023-12-05 ASSESSMENT — PAIN DESCRIPTION - ORIENTATION: ORIENTATION: LEFT

## 2023-12-05 ASSESSMENT — PAIN DESCRIPTION - LOCATION: LOCATION: HIP

## 2023-12-05 ASSESSMENT — PAIN SCALES - GENERAL: PAINLEVEL_OUTOF10: 5

## 2023-12-05 ASSESSMENT — LIFESTYLE VARIABLES
HOW MANY STANDARD DRINKS CONTAINING ALCOHOL DO YOU HAVE ON A TYPICAL DAY: PATIENT DOES NOT DRINK
HOW OFTEN DO YOU HAVE A DRINK CONTAINING ALCOHOL: NEVER

## 2023-12-05 ASSESSMENT — PAIN - FUNCTIONAL ASSESSMENT: PAIN_FUNCTIONAL_ASSESSMENT: 0-10

## 2023-12-05 ASSESSMENT — PAIN DESCRIPTION - PAIN TYPE: TYPE: ACUTE PAIN

## 2023-12-05 NOTE — ED TRIAGE NOTES
Patient presents with daughter who stated patient stumbled and fell onto her left hip hitting her head on the carpeted floor, denies any LOC or headache. Patient takes Aspirin daily. Patient has noticeable swelling to left hip area, painful on ambulation only.

## 2023-12-06 ENCOUNTER — TELEPHONE (OUTPATIENT)
Dept: FAMILY MEDICINE CLINIC | Facility: CLINIC | Age: 83
End: 2023-12-06

## 2023-12-06 NOTE — ED PROVIDER NOTES
Emergency Department Provider Note       PCP: Page Shell DO   Age: 79 y.o. Sex: female     DISPOSITION Decision To Discharge 12/05/2023 08:42:23 PM       ICD-10-CM    1. Hematoma of left hip, initial encounter  S70. 02XA       2. Acute cystitis without hematuria  N30.00       3. Leukocytosis, unspecified type  D72.829 [facility]          Medical Decision Making     Complexity of Problems Addressed: The patients assessment required an independent historian: daughter. The reason they were needed is important historical information not provided by the patient. Data Reviewed and Analyzed:  I independently ordered and reviewed each unique test.  I reviewed external records: provider visit note from PCP. I reviewed external records: previous lab results from outside ED. The patients assessment required an independent historian: daughter. The reason they were needed is important historical information not provided by the patient. I interpreted the X-rays no fracture or dislocation. Discussion of management or test interpretation. Patient has large hematoma to the left hip. No signs of any fracture she is able to ambulate and fully range it. We will try compression to this with Ace wrap. Also incidentally her white blood cell count came back very elevated at 27,000. She does seem to have some signs of polycythemia with her overall CBC and I am referring her into hematology. Risk of Complications and/or Morbidity of Patient Management:  Shared medical decision making was utilized in creating the patients health plan today. History      Patient was opening her bedroom door today and tripped and stumbled. She did not pass out. Did not hit her head. She hit directly onto her left hip. This occurred about 3 hours ago. She reports large area of swelling to the lateral left hip and some pain. She still been ambulatory on the legs.   She denies any

## 2023-12-06 NOTE — TELEPHONE ENCOUNTER
[facility] called and advised they would be going out to see patient tomorrow due to a fall and was wanting to know if they could get a copy of hospital encounter sent to them [phone]

## 2023-12-21 ENCOUNTER — TELEPHONE (OUTPATIENT)
Age: 83
End: 2023-12-21

## 2023-12-21 NOTE — TELEPHONE ENCOUNTER
Joaquin with [facility] called stating he was returning an earlier call from BB.    Please call and advise.

## 2023-12-29 DIAGNOSIS — D72.829 LEUKOCYTOSIS, UNSPECIFIED TYPE: Primary | ICD-10-CM

## 2024-01-03 NOTE — PROGRESS NOTES
NEW PATIENT INTAKE    Referral Diagnosis: Leukocytosis, unspecified type    Referring Provider: Page Thomas MD    Primary Care Provider: Todd Flores DO     Family History of Cancer/ Hematology Disorders: No known family history    Presenting Symptoms: Leukocytosis, unspecified type    Chronological History of Pertinent Events:     83-year-old white female    PMH: Acute systolic CHF, HTN, A-fib, CKD3,   Followed by Cardiology, Pulmonology, Home Health    12/5/23 - Patient went to ED (EPIC)  Patient's daughter reported that patient stumbled and fell onto her left hip, hitting her head on the carpeted floor; denies any LOC or HA.   Patient takes Aspirin daily.   Patient has noticeable swelling to left hip area, painful on ambulation only.   Abnormal labs (12/5/23): (EPIC)  WBC - 27.3  RBC - 6.34  Hgb - 17.3  Hct - 56.7  MCHC - 30.5  RDW - 18.7  Platelets - 591  Neutrophils % - 82  Lymphocyte % - 8  Neutrophils Abs - 22.2  Monocytes Abs - 2.0  Basophils Abs - 0.3  Leukocyte Esterase, Urine - Small  Creatinine - 1.44  Est, Glom Filt Rate - 36  Glucose - 106  RTC in 6 months    12/5/23 - XR HIP LEFT (2-3 VIEWS) revealing: (EPIC)  IMPRESSION:  No evidence of left hip fracture or dislocation.  Severe degenerative changes of bilateral hip joints    A referral has been placed with [facility] for a Medical Hematology consultation and treatment.    (EPIC)   Latest Reference Range & Units 12/28/22 16:45 12/28/22 22:08 12/29/22 03:25 12/30/22 08:38 12/31/22 03:33   WBC 4.3 - 11.1 K/uL 15.2 (H) 16.2 (H) 14.8 (H) 13.5 (H) 19.7 (H)      Latest Reference Range & Units 01/01/23 07:20 01/19/23 14:03 04/10/23 15:32 06/28/23 15:43 12/05/23 19:19   WBC 4.3 - 11.1 K/uL 13.0 (H) 16.1 (H) 15.2 (H) 15.7 (H) 27.3 (H)       Notes from Referring Provider: N/A    Presented at Tumor Board: No    Other Pertinent Information: N/A

## 2024-01-04 ENCOUNTER — HOSPITAL ENCOUNTER (OUTPATIENT)
Dept: LAB | Age: 84
Discharge: HOME | End: 2024-01-04
Payer: MEDICARE

## 2024-01-04 ENCOUNTER — OFFICE VISIT (OUTPATIENT)
Dept: ONCOLOGY | Age: 84
End: 2024-01-04
Payer: MEDICARE

## 2024-01-04 VITALS
OXYGEN SATURATION: 97 % | BODY MASS INDEX: 20.12 KG/M2 | DIASTOLIC BLOOD PRESSURE: 66 MMHG | WEIGHT: 110 LBS | RESPIRATION RATE: 16 BRPM | HEART RATE: 75 BPM | SYSTOLIC BLOOD PRESSURE: 114 MMHG | TEMPERATURE: 98.2 F

## 2024-01-04 DIAGNOSIS — D75.1 POLYCYTHEMIA: Chronic | ICD-10-CM

## 2024-01-04 DIAGNOSIS — D58.2 ELEVATED HEMOGLOBIN (HCC): ICD-10-CM

## 2024-01-04 DIAGNOSIS — D72.829 LEUKOCYTOSIS, UNSPECIFIED TYPE: Primary | ICD-10-CM

## 2024-01-04 DIAGNOSIS — R71.8 OTHER ABNORMALITY OF RED BLOOD CELLS: ICD-10-CM

## 2024-01-04 DIAGNOSIS — D72.829 LEUKOCYTOSIS, UNSPECIFIED TYPE: ICD-10-CM

## 2024-01-04 LAB
ALBUMIN SERPL-MCNC: 3.4 G/DL (ref 3.2–4.6)
ALBUMIN/GLOB SERPL: 0.9 (ref 0.4–1.6)
ALP SERPL-CCNC: 334 U/L (ref 50–136)
ALT SERPL-CCNC: 19 U/L (ref 12–65)
ANION GAP SERPL CALC-SCNC: 4 MMOL/L (ref 2–11)
AST SERPL-CCNC: 27 U/L (ref 15–37)
BASOPHILS # BLD: 0.1 K/UL (ref 0–0.2)
BASOPHILS NFR BLD: 1 % (ref 0–2)
BILIRUB SERPL-MCNC: 1.4 MG/DL (ref 0.2–1.1)
BUN SERPL-MCNC: 8 MG/DL (ref 8–23)
CALCIUM SERPL-MCNC: 8.6 MG/DL (ref 8.3–10.4)
CHLORIDE SERPL-SCNC: 106 MMOL/L (ref 103–113)
CO2 SERPL-SCNC: 27 MMOL/L (ref 21–32)
CREAT SERPL-MCNC: 1.2 MG/DL (ref 0.6–1)
CRP SERPL-MCNC: <0.3 MG/DL (ref 0–0.9)
DIFFERENTIAL METHOD BLD: ABNORMAL
EOSINOPHIL # BLD: 0.1 K/UL (ref 0–0.8)
EOSINOPHIL NFR BLD: 1 % (ref 0.5–7.8)
ERYTHROCYTE [DISTWIDTH] IN BLOOD BY AUTOMATED COUNT: 21.7 % (ref 11.9–14.6)
FERRITIN SERPL-MCNC: 116 NG/ML (ref 8–388)
GLOBULIN SER CALC-MCNC: 3.8 G/DL (ref 2.8–4.5)
GLUCOSE SERPL-MCNC: 103 MG/DL (ref 65–100)
HCT VFR BLD AUTO: 55.7 % (ref 35.8–46.3)
HGB BLD-MCNC: 17 G/DL (ref 11.7–15.4)
IMM GRANULOCYTES # BLD AUTO: 0.1 K/UL (ref 0–0.5)
IMM GRANULOCYTES NFR BLD AUTO: 0 % (ref 0–5)
IRON SATN MFR SERPL: 21 %
IRON SERPL-MCNC: 60 UG/DL (ref 35–150)
LDH SERPL L TO P-CCNC: 565 U/L (ref 110–210)
LYMPHOCYTES # BLD: 2.1 K/UL (ref 0.5–4.6)
LYMPHOCYTES NFR BLD: 15 % (ref 13–44)
Lab: NORMAL
MCH RBC QN AUTO: 28.3 PG (ref 26.1–32.9)
MCHC RBC AUTO-ENTMCNC: 30.5 G/DL (ref 31.4–35)
MCV RBC AUTO: 92.8 FL (ref 82–102)
MONOCYTES # BLD: 1.2 K/UL (ref 0.1–1.3)
MONOCYTES NFR BLD: 8 % (ref 4–12)
NEUTS SEG # BLD: 10.5 K/UL (ref 1.7–8.2)
NEUTS SEG NFR BLD: 75 % (ref 43–78)
NRBC # BLD: 0 K/UL (ref 0–0.2)
PLATELET # BLD AUTO: 528 K/UL (ref 150–450)
PMV BLD AUTO: 10.9 FL (ref 9.4–12.3)
POTASSIUM SERPL-SCNC: 3.3 MMOL/L (ref 3.5–5.1)
PROT SERPL-MCNC: 7.2 G/DL (ref 6.3–8.2)
RBC # BLD AUTO: 6 M/UL (ref 4.05–5.2)
REFERENCE LAB: NORMAL
SODIUM SERPL-SCNC: 137 MMOL/L (ref 136–146)
TIBC SERPL-MCNC: 286 UG/DL (ref 250–450)
WBC # BLD AUTO: 14 K/UL (ref 4.3–11.1)

## 2024-01-04 PROCEDURE — 83550 IRON BINDING TEST: CPT

## 2024-01-04 PROCEDURE — 82728 ASSAY OF FERRITIN: CPT

## 2024-01-04 PROCEDURE — 80053 COMPREHEN METABOLIC PANEL: CPT

## 2024-01-04 PROCEDURE — 85025 COMPLETE CBC W/AUTO DIFF WBC: CPT

## 2024-01-04 PROCEDURE — 1123F ACP DISCUSS/DSCN MKR DOCD: CPT | Performed by: INTERNAL MEDICINE

## 2024-01-04 PROCEDURE — 1090F PRES/ABSN URINE INCON ASSESS: CPT | Performed by: INTERNAL MEDICINE

## 2024-01-04 PROCEDURE — 99204 OFFICE O/P NEW MOD 45 MIN: CPT | Performed by: INTERNAL MEDICINE

## 2024-01-04 PROCEDURE — 36415 COLL VENOUS BLD VENIPUNCTURE: CPT

## 2024-01-04 PROCEDURE — G8400 PT W/DXA NO RESULTS DOC: HCPCS | Performed by: INTERNAL MEDICINE

## 2024-01-04 PROCEDURE — G8427 DOCREV CUR MEDS BY ELIG CLIN: HCPCS | Performed by: INTERNAL MEDICINE

## 2024-01-04 PROCEDURE — 83615 LACTATE (LD) (LDH) ENZYME: CPT

## 2024-01-04 PROCEDURE — 86140 C-REACTIVE PROTEIN: CPT

## 2024-01-04 PROCEDURE — G8420 CALC BMI NORM PARAMETERS: HCPCS | Performed by: INTERNAL MEDICINE

## 2024-01-04 PROCEDURE — G8484 FLU IMMUNIZE NO ADMIN: HCPCS | Performed by: INTERNAL MEDICINE

## 2024-01-04 PROCEDURE — 1036F TOBACCO NON-USER: CPT | Performed by: INTERNAL MEDICINE

## 2024-01-04 PROCEDURE — 83540 ASSAY OF IRON: CPT

## 2024-01-04 ASSESSMENT — PATIENT HEALTH QUESTIONNAIRE - PHQ9
SUM OF ALL RESPONSES TO PHQ QUESTIONS 1-9: 0
1. LITTLE INTEREST OR PLEASURE IN DOING THINGS: 0
SUM OF ALL RESPONSES TO PHQ9 QUESTIONS 1 & 2: 0
SUM OF ALL RESPONSES TO PHQ QUESTIONS 1-9: 0
SUM OF ALL RESPONSES TO PHQ QUESTIONS 1-9: 0
2. FEELING DOWN, DEPRESSED OR HOPELESS: 0
SUM OF ALL RESPONSES TO PHQ QUESTIONS 1-9: 0

## 2024-01-04 NOTE — PROGRESS NOTES
103 - 113 mmol/L    CO2 27 21 - 32 mmol/L    Anion Gap 4 2 - 11 mmol/L    Glucose 103 (H) 65 - 100 mg/dL    BUN 8 8 - 23 MG/DL    Creatinine 1.20 (H) 0.6 - 1.0 MG/DL    Est, Glom Filt Rate 45 (L) >60 ml/min/1.73m2    Calcium 8.6 8.3 - 10.4 MG/DL    Total Bilirubin 1.4 (H) 0.2 - 1.1 MG/DL    ALT 19 12 - 65 U/L    AST 27 15 - 37 U/L    Alk Phosphatase 334 (H) 50 - 136 U/L    Total Protein 7.2 6.3 - 8.2 g/dL    Albumin 3.4 3.2 - 4.6 g/dL    Globulin 3.8 2.8 - 4.5 g/dL    Albumin/Globulin Ratio 0.9 0.4 - 1.6     Lactate Dehydrogenase    Collection Time: 01/04/24 12:30 PM   Result Value Ref Range     (H) 110 - 210 U/L   MISCELLANEOUS SENDOUT JAK2 V617F - Qualitative Reflex to JAK2 Exaon 12-14, if negative send-out to [facility]    Collection Time: 01/04/24  2:44 PM   Result Value Ref Range    Test Description: JAK2 V617F     Reference Lab JESSY     Results: COLLECT FOR [facility]    C-Reactive Protein    Collection Time: 01/04/24  2:44 PM   Result Value Ref Range    CRP <0.3 0.0 - 0.9 mg/dL   Ferritin    Collection Time: 01/04/24  2:44 PM   Result Value Ref Range    Ferritin 116 8 - 388 NG/ML   Transferrin Saturation    Collection Time: 01/04/24  2:44 PM   Result Value Ref Range    Iron 60 35 - 150 ug/dL    TIBC 286 250 - 450 ug/dL    TRANSFERRIN SATURATION 21 >20 %   MISCELLANEOUS SENDOUT MPL - send to [employer]    Collection Time: 01/04/24  2:44 PM   Result Value Ref Range    Test Description: MPL     Reference Lab JESSY     Results: COLLECT FOR [facility]    MISCELLANEOUS SENDOUT JAK2 V617F - Qualitative Reflex to JAK2 Exaon 12-14, if negative send-out to [facility]    Collection Time: 01/04/24  2:44 PM   Result Value Ref Range    Test Description: JAK2 V617F     Reference Lab JESSY     Results: COLLECT FOR [facility]    MISCELLANEOUS SENDOUT BCR-ABL1 Non-Standard p230 Send out to [employer]    Collection Time: 01/04/24  2:44 PM   Result Value Ref Range    Test Description: BCR     Reference Lab JESSY     Results: COLLECT FOR [facility]

## 2024-01-04 NOTE — PATIENT INSTRUCTIONS
Patient Instructions from Today's Visit    Reason for Visit:  New patient visit for leukocytosis      Plan:  -Your white blood cells have been high since 2022. Your hemoglobin and red blood cells have also been elevated.   -Will run more tests for further treatment.    Follow Up:  3-4 weeks    Recent Lab Results:  Hospital Outpatient Visit on 01/04/2024   Component Date Value Ref Range Status    WBC 01/04/2024 14.0 (H)  4.3 - 11.1 K/uL Final    RBC 01/04/2024 6.00 (H)  4.05 - 5.2 M/uL Final    Hemoglobin 01/04/2024 17.0 (H)  11.7 - 15.4 g/dL Final    Hematocrit 01/04/2024 55.7 (H)  35.8 - 46.3 % Final    MCV 01/04/2024 92.8  82.0 - 102.0 FL Final    MCH 01/04/2024 28.3  26.1 - 32.9 PG Final    MCHC 01/04/2024 30.5 (L)  31.4 - 35.0 g/dL Final    RDW 01/04/2024 21.7 (H)  11.9 - 14.6 % Final    Platelets 01/04/2024 528 (H)  150 - 450 K/uL Final    MPV 01/04/2024 10.9  9.4 - 12.3 FL Final    nRBC 01/04/2024 0.00  0.0 - 0.2 K/uL Final    **Note: Absolute NRBC parameter is now reported with Hemogram**    Neutrophils % 01/04/2024 75  43 - 78 % Final    Lymphocytes % 01/04/2024 15  13 - 44 % Final    Monocytes % 01/04/2024 8  4.0 - 12.0 % Final    Eosinophils % 01/04/2024 1  0.5 - 7.8 % Final    Basophils % 01/04/2024 1  0.0 - 2.0 % Final    Immature Granulocytes 01/04/2024 0  0.0 - 5.0 % Final    Neutrophils Absolute 01/04/2024 10.5 (H)  1.7 - 8.2 K/UL Final    Lymphocytes Absolute 01/04/2024 2.1  0.5 - 4.6 K/UL Final    Monocytes Absolute 01/04/2024 1.2  0.1 - 1.3 K/UL Final    Eosinophils Absolute 01/04/2024 0.1  0.0 - 0.8 K/UL Final    Basophils Absolute 01/04/2024 0.1  0.0 - 0.2 K/UL Final    Absolute Immature Granulocyte 01/04/2024 0.1  0.0 - 0.5 K/UL Final    Differential Type 01/04/2024 AUTOMATED    Final    Sodium 01/04/2024 137  136 - 146 mmol/L Final    Potassium 01/04/2024 3.3 (L)  3.5 - 5.1 mmol/L Final    Chloride 01/04/2024 106  103 - 113 mmol/L Final    CO2 01/04/2024 27  21 - 32 mmol/L Final    Anion Gap

## 2024-01-08 LAB
FLOW CYTOMETRY RESULTS: NORMAL
SPECIMEN SOURCE: NORMAL
TEST ORDERED: NORMAL

## 2024-02-01 ENCOUNTER — HOSPITAL ENCOUNTER (OUTPATIENT)
Dept: LAB | Age: 84
Discharge: HOME | End: 2024-02-01
Payer: MEDICARE

## 2024-02-01 ENCOUNTER — OFFICE VISIT (OUTPATIENT)
Dept: ONCOLOGY | Age: 84
End: 2024-02-01
Payer: MEDICARE

## 2024-02-01 VITALS
TEMPERATURE: 98.1 F | HEART RATE: 75 BPM | BODY MASS INDEX: 20.11 KG/M2 | HEIGHT: 62 IN | OXYGEN SATURATION: 94 % | DIASTOLIC BLOOD PRESSURE: 55 MMHG | SYSTOLIC BLOOD PRESSURE: 99 MMHG | RESPIRATION RATE: 18 BRPM | WEIGHT: 109.3 LBS

## 2024-02-01 DIAGNOSIS — D47.1 MYELOPROLIFERATIVE DISORDER (HCC): Primary | ICD-10-CM

## 2024-02-01 DIAGNOSIS — I95.9 HYPOTENSION, UNSPECIFIED HYPOTENSION TYPE: ICD-10-CM

## 2024-02-01 DIAGNOSIS — D45 POLYCYTHEMIA VERA (HCC): ICD-10-CM

## 2024-02-01 DIAGNOSIS — D72.829 LEUKOCYTOSIS, UNSPECIFIED TYPE: ICD-10-CM

## 2024-02-01 DIAGNOSIS — D75.1 POLYCYTHEMIA: Chronic | ICD-10-CM

## 2024-02-01 LAB
ALBUMIN SERPL-MCNC: 3.9 G/DL (ref 3.2–4.6)
ALBUMIN/GLOB SERPL: 1 (ref 0.4–1.6)
ALP SERPL-CCNC: 194 U/L (ref 50–136)
ALT SERPL-CCNC: 26 U/L (ref 12–65)
ANION GAP SERPL CALC-SCNC: 5 MMOL/L (ref 2–11)
AST SERPL-CCNC: 34 U/L (ref 15–37)
BASOPHILS # BLD: 0.2 K/UL (ref 0–0.2)
BASOPHILS NFR BLD: 2 % (ref 0–2)
BILIRUB SERPL-MCNC: 1.4 MG/DL (ref 0.2–1.1)
BUN SERPL-MCNC: 12 MG/DL (ref 8–23)
CALCIUM SERPL-MCNC: 9 MG/DL (ref 8.3–10.4)
CHLORIDE SERPL-SCNC: 104 MMOL/L (ref 103–113)
CO2 SERPL-SCNC: 26 MMOL/L (ref 21–32)
CREAT SERPL-MCNC: 1.3 MG/DL (ref 0.6–1)
DIFFERENTIAL METHOD BLD: ABNORMAL
EOSINOPHIL # BLD: 0.1 K/UL (ref 0–0.8)
EOSINOPHIL NFR BLD: 1 % (ref 0.5–7.8)
ERYTHROCYTE [DISTWIDTH] IN BLOOD BY AUTOMATED COUNT: 21 % (ref 11.9–14.6)
GLOBULIN SER CALC-MCNC: 3.8 G/DL (ref 2.8–4.5)
GLUCOSE SERPL-MCNC: 109 MG/DL (ref 65–100)
HCT VFR BLD AUTO: 59.1 % (ref 35.8–46.3)
HGB BLD-MCNC: 17.9 G/DL (ref 11.7–15.4)
IMM GRANULOCYTES # BLD AUTO: 0.1 K/UL (ref 0–0.5)
IMM GRANULOCYTES NFR BLD AUTO: 1 % (ref 0–5)
LYMPHOCYTES # BLD: 2.7 K/UL (ref 0.5–4.6)
LYMPHOCYTES NFR BLD: 17 % (ref 13–44)
MCH RBC QN AUTO: 28.8 PG (ref 26.1–32.9)
MCHC RBC AUTO-ENTMCNC: 30.3 G/DL (ref 31.4–35)
MCV RBC AUTO: 95 FL (ref 82–102)
MONOCYTES # BLD: 1.4 K/UL (ref 0.1–1.3)
MONOCYTES NFR BLD: 9 % (ref 4–12)
NEUTS SEG # BLD: 11.1 K/UL (ref 1.7–8.2)
NEUTS SEG NFR BLD: 70 % (ref 43–78)
NRBC # BLD: 0 K/UL (ref 0–0.2)
PLATELET # BLD AUTO: 577 K/UL (ref 150–450)
PMV BLD AUTO: 11 FL (ref 9.4–12.3)
POTASSIUM SERPL-SCNC: 3.9 MMOL/L (ref 3.5–5.1)
PROT SERPL-MCNC: 7.7 G/DL (ref 6.3–8.2)
RBC # BLD AUTO: 6.22 M/UL (ref 4.05–5.2)
SODIUM SERPL-SCNC: 135 MMOL/L (ref 136–146)
WBC # BLD AUTO: 15.6 K/UL (ref 4.3–11.1)

## 2024-02-01 PROCEDURE — 1090F PRES/ABSN URINE INCON ASSESS: CPT | Performed by: INTERNAL MEDICINE

## 2024-02-01 PROCEDURE — 85025 COMPLETE CBC W/AUTO DIFF WBC: CPT

## 2024-02-01 PROCEDURE — G8484 FLU IMMUNIZE NO ADMIN: HCPCS | Performed by: INTERNAL MEDICINE

## 2024-02-01 PROCEDURE — G8420 CALC BMI NORM PARAMETERS: HCPCS | Performed by: INTERNAL MEDICINE

## 2024-02-01 PROCEDURE — 99214 OFFICE O/P EST MOD 30 MIN: CPT | Performed by: INTERNAL MEDICINE

## 2024-02-01 PROCEDURE — G8427 DOCREV CUR MEDS BY ELIG CLIN: HCPCS | Performed by: INTERNAL MEDICINE

## 2024-02-01 PROCEDURE — 80053 COMPREHEN METABOLIC PANEL: CPT

## 2024-02-01 PROCEDURE — 1036F TOBACCO NON-USER: CPT | Performed by: INTERNAL MEDICINE

## 2024-02-01 PROCEDURE — 36415 COLL VENOUS BLD VENIPUNCTURE: CPT

## 2024-02-01 PROCEDURE — G8400 PT W/DXA NO RESULTS DOC: HCPCS | Performed by: INTERNAL MEDICINE

## 2024-02-01 PROCEDURE — 1123F ACP DISCUSS/DSCN MKR DOCD: CPT | Performed by: INTERNAL MEDICINE

## 2024-02-01 RX ORDER — CEFDINIR 300 MG/1
CAPSULE ORAL
COMMUNITY

## 2024-02-01 RX ORDER — HYDROXYUREA 500 MG/1
500 CAPSULE ORAL EVERY OTHER DAY
Qty: 45 CAPSULE | Refills: 1 | Status: SHIPPED | OUTPATIENT
Start: 2024-02-01 | End: 2024-05-01

## 2024-02-01 ASSESSMENT — PATIENT HEALTH QUESTIONNAIRE - PHQ9
1. LITTLE INTEREST OR PLEASURE IN DOING THINGS: 0
SUM OF ALL RESPONSES TO PHQ QUESTIONS 1-9: 0
2. FEELING DOWN, DEPRESSED OR HOPELESS: 0
SUM OF ALL RESPONSES TO PHQ9 QUESTIONS 1 & 2: 0

## 2024-02-01 NOTE — PROGRESS NOTES
icterus. Neck supple.  No JVD present.  No tracheal deviation present. No thyromegaly present.    Lymph node No palpable submandibular, cervical, supraclavicular, axillary and inguinal lymph nodes.   Skin Warm and dry.  No bruising and no rash noted.  No erythema.  No pallor.    Respiratory Effort normal and breath sounds normal.  No respiratory distress.  No wheezes.  No rales.  No tenderness.    CVS Normal rate, regular rhythm and normal heart sounds.  Exam reveals no gallop, no friction and no rub.  No murmur heard.   Abdomen Soft. Bowel sounds are normal. Exhibits no distension. There is no tenderness. There is no rebound and no guarding.   Neuro Normal reflexes.  No cranial nerve deficit.  Exhibits normal muscle tone, 5 of 5 strength of all extremities.   MSK Normal range of motion in general.  No edema and no tenderness.   Psych Normal mood, affect, behavior, judgment and thought content.  Forgetful.     Labs:  Recent Results (from the past 24 hour(s))   Comprehensive Metabolic Panel    Collection Time: 02/01/24 12:53 PM   Result Value Ref Range    Sodium 135 (L) 136 - 146 mmol/L    Potassium 3.9 3.5 - 5.1 mmol/L    Chloride 104 103 - 113 mmol/L    CO2 26 21 - 32 mmol/L    Anion Gap 5 2 - 11 mmol/L    Glucose 109 (H) 65 - 100 mg/dL    BUN 12 8 - 23 MG/DL    Creatinine 1.30 (H) 0.6 - 1.0 MG/DL    Est, Glom Filt Rate 41 (L) >60 ml/min/1.73m2    Calcium 9.0 8.3 - 10.4 MG/DL    Total Bilirubin 1.4 (H) 0.2 - 1.1 MG/DL    ALT 26 12 - 65 U/L    AST 34 15 - 37 U/L    Alk Phosphatase 194 (H) 50 - 136 U/L    Total Protein 7.7 6.3 - 8.2 g/dL    Albumin 3.9 3.2 - 4.6 g/dL    Globulin 3.8 2.8 - 4.5 g/dL    Albumin/Globulin Ratio 1.0 0.4 - 1.6     CBC with Auto Differential    Collection Time: 02/01/24 12:53 PM   Result Value Ref Range    WBC 15.6 (H) 4.3 - 11.1 K/uL    RBC 6.22 (H) 4.05 - 5.2 M/uL    Hemoglobin 17.9 (H) 11.7 - 15.4 g/dL    Hematocrit 59.1 (H) 35.8 - 46.3 %    MCV 95.0 82.0 - 102.0 FL    MCH 28.8 26.1 -

## 2024-02-01 NOTE — PATIENT INSTRUCTIONS
call our office at [phone] if you have any  of the following symptoms:   Fever of 100.5 or greater  Chills  Shortness of breath  Swelling or pain in one leg    After office hours an answering service is available and will contact a provider for emergencies or if you are experiencing any of the above symptoms.    Patient does express an interest in My Chart.  My Chart log in information explained on the after visit summary printout at the check-out desk.    JUANCARLOS Jaimes RN

## 2024-03-14 ENCOUNTER — OFFICE VISIT (OUTPATIENT)
Dept: FAMILY MEDICINE CLINIC | Facility: CLINIC | Age: 84
End: 2024-03-14

## 2024-03-14 VITALS
DIASTOLIC BLOOD PRESSURE: 80 MMHG | SYSTOLIC BLOOD PRESSURE: 122 MMHG | HEIGHT: 62 IN | BODY MASS INDEX: 21.09 KG/M2 | WEIGHT: 114.6 LBS | RESPIRATION RATE: 16 BRPM

## 2024-03-14 DIAGNOSIS — I50.22 CHRONIC SYSTOLIC CONGESTIVE HEART FAILURE (HCC): Chronic | ICD-10-CM

## 2024-03-14 DIAGNOSIS — D75.1 POLYCYTHEMIA: Primary | ICD-10-CM

## 2024-03-14 DIAGNOSIS — Z00.00 HEALTHCARE MAINTENANCE: ICD-10-CM

## 2024-03-14 DIAGNOSIS — I48.19 PERSISTENT ATRIAL FIBRILLATION (HCC): Chronic | ICD-10-CM

## 2024-03-14 DIAGNOSIS — R74.8 ELEVATED ALKALINE PHOSPHATASE LEVEL: ICD-10-CM

## 2024-03-14 DIAGNOSIS — E55.9 VITAMIN D DEFICIENCY: ICD-10-CM

## 2024-03-14 DIAGNOSIS — R25.1 TREMOR: Primary | ICD-10-CM

## 2024-03-14 DIAGNOSIS — M85.89 OSTEOPENIA OF MULTIPLE SITES: ICD-10-CM

## 2024-03-14 LAB — DIGOXIN SERPL-MCNC: 1.2 NG/ML (ref 0.9–2.1)

## 2024-03-14 RX ORDER — TORSEMIDE 10 MG/1
10 TABLET ORAL DAILY
Qty: 90 TABLET | Refills: 1 | Status: SHIPPED | OUTPATIENT
Start: 2024-03-14 | End: 2024-09-10

## 2024-03-14 RX ORDER — DIGOXIN 125 MCG
125 TABLET ORAL DAILY
Qty: 90 TABLET | Refills: 1 | Status: SHIPPED | OUTPATIENT
Start: 2024-03-14 | End: 2024-09-10

## 2024-03-14 RX ORDER — METOPROLOL SUCCINATE 25 MG/1
25 TABLET, EXTENDED RELEASE ORAL 2 TIMES DAILY
Qty: 180 TABLET | Refills: 1 | Status: SHIPPED | OUTPATIENT
Start: 2024-03-14 | End: 2024-09-10

## 2024-03-14 ASSESSMENT — PATIENT HEALTH QUESTIONNAIRE - PHQ9
1. LITTLE INTEREST OR PLEASURE IN DOING THINGS: 0
2. FEELING DOWN, DEPRESSED OR HOPELESS: 0
SUM OF ALL RESPONSES TO PHQ QUESTIONS 1-9: 0
SUM OF ALL RESPONSES TO PHQ9 QUESTIONS 1 & 2: 0
SUM OF ALL RESPONSES TO PHQ QUESTIONS 1-9: 0

## 2024-03-14 ASSESSMENT — ENCOUNTER SYMPTOMS: RESPIRATORY NEGATIVE: 1

## 2024-03-14 NOTE — PROGRESS NOTES
PROGRESS NOTE    SUBJECTIVE:   Kati Brandon is a 83 y.o. female seen for a follow up visit regarding   Chief Complaint   Patient presents with    New Patient        HPI:  Previously cared for by Dr Flores here today for follow-up.  She is doing relatively well but is very concerned about a tremor that she has.  Tremors primarily in repose, better with action.  Involves arms and head and somewhat her voice.  No family history of Parkinson's disease.  She has a history of HFrEF, last ejection fraction was 40 to 45%.  She has chronic A-fib, is on digoxin, last dig level was good in April 23.  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         Reviewed and updated this visit by provider:  Tobacco  Allergies  Meds  Problems  Med Hx  Surg Hx  Fam Hx           Review of Systems   Respiratory: Negative.     Cardiovascular: Negative.           OBJECTIVE:  Vitals:    03/14/24 1327   BP: 122/80   Resp: 16   Weight: 52 kg (114 lb 9.6 oz)   Height: 1.575 m (5' 2\")        Physical Exam   General: Alert, oriented to person and place, resting tremor of the head and left arm/hand  Neck: No JVD or thyromegaly  Chest: Good airflow throughout without rales rhonchi  CVS: Regular rhythm presently with normal rate, she has a soft systolic outflow murmur heard best at the apex  Neuro: Tremor of head and left hand/arm in repose, better with action.  Cogwheeling noted, no leadpipe rigidity, non-shuffling gait presently without ataxia    Medical problems and test results were reviewed with the patient today.     No results found for this or any previous visit (from the past 672 hour(s)).    No results found for any visits on 03/14/24.     ASSESSMENT and PLAN    1. 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 Chronic systolic congestive heart failure (HCC)  Overview:  1.  Echo (12/29/22):  EF 15-20%. Severe GHK.  RV dysfunction.  Severe TR/MR.  RVSP 47.    2.   Echo

## 2024-03-15 LAB — 25(OH)D3 SERPL-MCNC: 8.1 NG/ML (ref 30–100)

## 2024-03-20 ENCOUNTER — HOSPITAL ENCOUNTER (OUTPATIENT)
Dept: MAMMOGRAPHY | Age: 84
Discharge: HOME | End: 2024-03-23
Attending: FAMILY MEDICINE
Payer: MEDICARE

## 2024-03-20 DIAGNOSIS — R74.8 ELEVATED ALKALINE PHOSPHATASE LEVEL: ICD-10-CM

## 2024-03-20 DIAGNOSIS — M85.89 OSTEOPENIA OF MULTIPLE SITES: ICD-10-CM

## 2024-03-20 PROCEDURE — 77080 DXA BONE DENSITY AXIAL: CPT

## 2024-03-21 ENCOUNTER — HOSPITAL ENCOUNTER (OUTPATIENT)
Dept: LAB | Age: 84
Discharge: HOME | End: 2024-03-21
Payer: MEDICARE

## 2024-03-21 ENCOUNTER — OFFICE VISIT (OUTPATIENT)
Dept: ONCOLOGY | Age: 84
End: 2024-03-21
Payer: MEDICARE

## 2024-03-21 VITALS
BODY MASS INDEX: 21.07 KG/M2 | RESPIRATION RATE: 18 BRPM | DIASTOLIC BLOOD PRESSURE: 65 MMHG | SYSTOLIC BLOOD PRESSURE: 127 MMHG | HEIGHT: 62 IN | WEIGHT: 114.5 LBS | OXYGEN SATURATION: 92 % | TEMPERATURE: 98.9 F | HEART RATE: 80 BPM

## 2024-03-21 DIAGNOSIS — D45 POLYCYTHEMIA VERA (HCC): ICD-10-CM

## 2024-03-21 DIAGNOSIS — D75.839 THROMBOCYTOSIS: ICD-10-CM

## 2024-03-21 DIAGNOSIS — D75.1 POLYCYTHEMIA: ICD-10-CM

## 2024-03-21 DIAGNOSIS — D47.1 MYELOPROLIFERATIVE DISORDER (HCC): Primary | ICD-10-CM

## 2024-03-21 LAB
ALBUMIN SERPL-MCNC: 3.8 G/DL (ref 3.2–4.6)
ALBUMIN/GLOB SERPL: 1 (ref 0.4–1.6)
ALP SERPL-CCNC: 121 U/L (ref 50–136)
ALT SERPL-CCNC: 29 U/L (ref 12–65)
ANION GAP SERPL CALC-SCNC: 5 MMOL/L (ref 2–11)
AST SERPL-CCNC: 37 U/L (ref 15–37)
BASOPHILS # BLD: 0.3 K/UL (ref 0–0.2)
BASOPHILS NFR BLD: 2 % (ref 0–2)
BILIRUB SERPL-MCNC: 2 MG/DL (ref 0.2–1.1)
BUN SERPL-MCNC: 18 MG/DL (ref 8–23)
CALCIUM SERPL-MCNC: 9.1 MG/DL (ref 8.3–10.4)
CHLORIDE SERPL-SCNC: 109 MMOL/L (ref 103–113)
CO2 SERPL-SCNC: 25 MMOL/L (ref 21–32)
CREAT SERPL-MCNC: 1.5 MG/DL (ref 0.6–1)
DIFFERENTIAL METHOD BLD: ABNORMAL
EOSINOPHIL # BLD: 0.1 K/UL (ref 0–0.8)
EOSINOPHIL NFR BLD: 1 % (ref 0.5–7.8)
ERYTHROCYTE [DISTWIDTH] IN BLOOD BY AUTOMATED COUNT: 17.9 % (ref 11.9–14.6)
GLOBULIN SER CALC-MCNC: 3.7 G/DL (ref 2.8–4.5)
GLUCOSE SERPL-MCNC: 97 MG/DL (ref 65–100)
HCT VFR BLD AUTO: 57.1 % (ref 35.8–46.3)
HGB BLD-MCNC: 17.4 G/DL (ref 11.7–15.4)
IMM GRANULOCYTES # BLD AUTO: 0.1 K/UL (ref 0–0.5)
IMM GRANULOCYTES NFR BLD AUTO: 1 % (ref 0–5)
LYMPHOCYTES # BLD: 4.1 K/UL (ref 0.5–4.6)
LYMPHOCYTES NFR BLD: 22 % (ref 13–44)
MCH RBC QN AUTO: 27.9 PG (ref 26.1–32.9)
MCHC RBC AUTO-ENTMCNC: 30.5 G/DL (ref 31.4–35)
MCV RBC AUTO: 91.5 FL (ref 82–102)
MONOCYTES # BLD: 1.6 K/UL (ref 0.1–1.3)
MONOCYTES NFR BLD: 8 % (ref 4–12)
NEUTS SEG # BLD: 12.5 K/UL (ref 1.7–8.2)
NEUTS SEG NFR BLD: 66 % (ref 43–78)
NRBC # BLD: 0 K/UL (ref 0–0.2)
PLATELET # BLD AUTO: 630 K/UL (ref 150–450)
PMV BLD AUTO: 11.1 FL (ref 9.4–12.3)
POTASSIUM SERPL-SCNC: 4.8 MMOL/L (ref 3.5–5.1)
PROT SERPL-MCNC: 7.5 G/DL (ref 6.3–8.2)
RBC # BLD AUTO: 6.24 M/UL (ref 4.05–5.2)
SODIUM SERPL-SCNC: 139 MMOL/L (ref 136–146)
WBC # BLD AUTO: 18.8 K/UL (ref 4.3–11.1)

## 2024-03-21 PROCEDURE — 36415 COLL VENOUS BLD VENIPUNCTURE: CPT

## 2024-03-21 PROCEDURE — 80053 COMPREHEN METABOLIC PANEL: CPT

## 2024-03-21 PROCEDURE — 99214 OFFICE O/P EST MOD 30 MIN: CPT | Performed by: INTERNAL MEDICINE

## 2024-03-21 PROCEDURE — 1123F ACP DISCUSS/DSCN MKR DOCD: CPT | Performed by: INTERNAL MEDICINE

## 2024-03-21 PROCEDURE — 85025 COMPLETE CBC W/AUTO DIFF WBC: CPT

## 2024-03-21 ASSESSMENT — PATIENT HEALTH QUESTIONNAIRE - PHQ9
1. LITTLE INTEREST OR PLEASURE IN DOING THINGS: NOT AT ALL
SUM OF ALL RESPONSES TO PHQ QUESTIONS 1-9: 0
SUM OF ALL RESPONSES TO PHQ9 QUESTIONS 1 & 2: 0
2. FEELING DOWN, DEPRESSED OR HOPELESS: NOT AT ALL
SUM OF ALL RESPONSES TO PHQ QUESTIONS 1-9: 0

## 2024-03-21 NOTE — PROGRESS NOTES
[facility] Hematology & Oncology: Office Visit Progress Note    Chief Complaint:    Leukocytosis    History of Present Illness:  Referral Diagnosis: Leukocytosis, unspecified type     Referring Provider: Page Thomas MD     Primary Care Provider: Todd Flores DO                Family History of Cancer/ Hematology Disorders: No known family history     Presenting Symptoms: Leukocytosis, unspecified type      83 y.o. white female     PMH: Acute systolic CHF, HTN, A-fib, CKD3,   Followed by Cardiology, Pulmonology, Home Health     12/5/23 - Patient went to ED (EPIC)  Patient's daughter reported that patient stumbled and fell onto her left hip, hitting her head on the carpeted floor; denies any LOC or HA.   Patient takes Aspirin daily.   Patient has noticeable swelling to left hip area, painful on ambulation only.   Abnormal labs (12/5/23): (EPIC)  WBC - 27.3  RBC - 6.34  Hgb - 17.3  Hct - 56.7  MCHC - 30.5  RDW - 18.7  Platelets - 591  Neutrophils % - 82  Lymphocyte % - 8  Neutrophils Abs - 22.2  Monocytes Abs - 2.0  Basophils Abs - 0.3  Leukocyte Esterase, Urine - Small  Creatinine - 1.44  Est, Glom Filt Rate - 36  Glucose - 106  RTC in 6 months     12/5/23 - XR HIP LEFT (2-3 VIEWS) revealing: (EPIC)  IMPRESSION:  No evidence of left hip fracture or dislocation.  Severe degenerative changes of bilateral hip joints     A referral has been placed with [facility] for a Medical Hematology consultation and treatment.     (EPIC)    Latest Reference Range & Units 12/28/22 16:45 12/28/22 22:08 12/29/22 03:25 12/30/22 08:38 12/31/22 03:33   WBC 4.3 - 11.1 K/uL 15.2 (H) 16.2 (H) 14.8 (H) 13.5 (H) 19.7 (H)        Latest Reference Range & Units 01/01/23 07:20 01/19/23 14:03 04/10/23 15:32 06/28/23 15:43 12/05/23 19:19   WBC 4.3 - 11.1 K/uL 13.0 (H) 16.1 (H) 15.2 (H) 15.7 (H) 27.3 (H)        Review of Systems:  Constitutional Denies fever or chills.  Denies weight loss or appetite changes. Denies fatigue. Denies anorexia.

## 2024-03-21 NOTE — PATIENT INSTRUCTIONS
Patient Information from Today's Visit        Treatment Summary has been discussed and given to patient:N/A  Follow Up: Follow up with PCP and come back as needed.     You have decided not to take Hydrea. No need to follow up in our office at this time.    Hospital Outpatient Visit on 03/21/2024   Component Date Value Ref Range Status    Sodium 03/21/2024 139  136 - 146 mmol/L Final    Potassium 03/21/2024 4.8  3.5 - 5.1 mmol/L Final    Chloride 03/21/2024 109  103 - 113 mmol/L Final    CO2 03/21/2024 25  21 - 32 mmol/L Final    Anion Gap 03/21/2024 5  2 - 11 mmol/L Final    Glucose 03/21/2024 97  65 - 100 mg/dL Final    BUN 03/21/2024 18  8 - 23 MG/DL Final    Creatinine 03/21/2024 1.50 (H)  0.6 - 1.0 MG/DL Final    Est, Glom Filt Rate 03/21/2024 34 (L)  >60 ml/min/1.73m2 Final    Comment:    Pediatric calculator link: https://www.kidney.org/professionals/kdoqi/gfr_calculatorped     These results are not intended for use in patients <18 years of age.     eGFR results are calculated without a race factor using  the 2021 CKD-EPI equation. Careful clinical correlation is recommended, particularly when comparing to results calculated using previous equations.  The CKD-EPI equation is less accurate in patients with extremes of muscle mass, extra-renal metabolism of creatinine, excessive creatine ingestion, or following therapy that affects renal tubular secretion.      Calcium 03/21/2024 9.1  8.3 - 10.4 MG/DL Final    Total Bilirubin 03/21/2024 2.0 (H)  0.2 - 1.1 MG/DL Final    ALT 03/21/2024 29  12 - 65 U/L Final    AST 03/21/2024 37  15 - 37 U/L Final    Alk Phosphatase 03/21/2024 121  50 - 136 U/L Final    Total Protein 03/21/2024 7.5  6.3 - 8.2 g/dL Final    Albumin 03/21/2024 3.8  3.2 - 4.6 g/dL Final    Globulin 03/21/2024 3.7  2.8 - 4.5 g/dL Final    Albumin/Globulin Ratio 03/21/2024 1.0  0.4 - 1.6   Final    WBC 03/21/2024 18.8 (H)  4.3 - 11.1 K/uL Final    RBC 03/21/2024 6.24 (H)  4.05 - 5.2 M/uL Final

## 2024-03-28 DIAGNOSIS — E55.9 VITAMIN D DEFICIENCY: Primary | ICD-10-CM

## 2024-03-28 RX ORDER — ERGOCALCIFEROL 1.25 MG/1
50000 CAPSULE ORAL WEEKLY
Qty: 12 CAPSULE | Refills: 0 | Status: SHIPPED | OUTPATIENT
Start: 2024-03-28 | End: 2024-06-26

## 2024-04-17 ENCOUNTER — OFFICE VISIT (OUTPATIENT)
Dept: NEUROLOGY | Age: 84
End: 2024-04-17
Payer: MEDICARE

## 2024-04-17 VITALS — WEIGHT: 114 LBS | HEIGHT: 62 IN | BODY MASS INDEX: 20.98 KG/M2

## 2024-04-17 DIAGNOSIS — G30.1 MODERATE LATE ONSET ALZHEIMER'S DEMENTIA WITHOUT BEHAVIORAL DISTURBANCE, PSYCHOTIC DISTURBANCE, MOOD DISTURBANCE, OR ANXIETY (HCC): ICD-10-CM

## 2024-04-17 DIAGNOSIS — G20.C PRIMARY PARKINSONISM (HCC): Primary | ICD-10-CM

## 2024-04-17 DIAGNOSIS — F02.B0 MODERATE LATE ONSET ALZHEIMER'S DEMENTIA WITHOUT BEHAVIORAL DISTURBANCE, PSYCHOTIC DISTURBANCE, MOOD DISTURBANCE, OR ANXIETY (HCC): ICD-10-CM

## 2024-04-17 DIAGNOSIS — R26.9 GAIT DISTURBANCE: ICD-10-CM

## 2024-04-17 PROCEDURE — 99205 OFFICE O/P NEW HI 60 MIN: CPT | Performed by: PSYCHIATRY & NEUROLOGY

## 2024-04-17 PROCEDURE — 1123F ACP DISCUSS/DSCN MKR DOCD: CPT | Performed by: PSYCHIATRY & NEUROLOGY

## 2024-04-17 NOTE — PATIENT INSTRUCTIONS
There is evidence of memory loss and cognitive impairment that looks very suggestive of a dementia.  This could be related to Alzheimer's disease or other changes in the brain.  You also have tremors that look very suggestive of Parkinson's disease tremors.    I would like to get some pictures of the brain to help better clarify what you have and what is causing these problems.     We have ordered an MRI of your brain. Radiology will contact you to schedule this.    Start carbidopa/levodopa  mg tablets.   Week 1: Take 1/2 tablet in the morning, 1/2 tablet in the afternoon, 1/2 tablet in the evening  Week 2: Take 1 tablet in the morning, 1 tablet in the afternoon, 1 tablet in the evening    This is a medication for the tremors.

## 2024-04-17 NOTE — PROGRESS NOTES
[facility] NEUROLOG[facility]  [address], [address]  [place] [address]  Phone: [phone] Fax [phone]  Moose Rodgers MD      Patient: Kati Brandon  Provider: Moose Rodgers MD    CC:   Chief Complaint   Patient presents with    New Patient    Tremors     Referring Provider:    History of Present Illness:     Kati Brandon is a 83 y.o. RH female who presents for evaluation of tremors.     She is accompanied by her daughter.      Patient presents for further evaluation of tremors in association with progressive cognitive impairment.  She is a very pleasant lady of [place] origin who presents today in a wheelchair.  Her daughter provides most of the history.    It appears that there has been a somewhat progressive pattern of both physical and cognitive deterioration over the last 4 to 5 years.  There has been progressive memory loss.  She tends to repeat herself very frequently at home and this is very noticeable on exam today.  She currently lives with her spouse and her son and she has other family that live close by and they all contribute caregiver duties.  She essentially spends no time at home alone.  Her family manages her medications.  She is not able to drive.  She takes on no other chores, meal preparation, or household duties. She does remain fairly independent with respect to self-care and personal hygiene and reports being able to dress herself as well as toilet and feed herself independently.     She has had progressive tremors which are very noticeable on exam and take on the form of a resting tremor of the upper extremities, slightly worse on the left as compared to the right. There is also a very significant perioral/jaw tremor while at rest.  There is a slight head tremor as well, all of the relatively the same frequency.  There is a slight or action component but it seems to be milder in appearance though her handwriting is somewhat affected by the tremor.     She denies any

## 2024-04-19 ASSESSMENT — ENCOUNTER SYMPTOMS
VOICE CHANGE: 1
COUGH: 0
ABDOMINAL PAIN: 0

## 2024-05-09 ENCOUNTER — HOSPITAL ENCOUNTER (OUTPATIENT)
Dept: MRI IMAGING | Age: 84
Discharge: HOME | End: 2024-05-09
Attending: PSYCHIATRY & NEUROLOGY
Payer: MEDICARE

## 2024-05-09 DIAGNOSIS — G20.C PRIMARY PARKINSONISM (HCC): ICD-10-CM

## 2024-05-09 DIAGNOSIS — F02.B0 MODERATE LATE ONSET ALZHEIMER'S DEMENTIA WITHOUT BEHAVIORAL DISTURBANCE, PSYCHOTIC DISTURBANCE, MOOD DISTURBANCE, OR ANXIETY (HCC): ICD-10-CM

## 2024-05-09 DIAGNOSIS — G30.1 MODERATE LATE ONSET ALZHEIMER'S DEMENTIA WITHOUT BEHAVIORAL DISTURBANCE, PSYCHOTIC DISTURBANCE, MOOD DISTURBANCE, OR ANXIETY (HCC): ICD-10-CM

## 2024-05-09 PROCEDURE — 70551 MRI BRAIN STEM W/O DYE: CPT

## 2024-05-09 RX ORDER — SODIUM CHLORIDE 0.9 % (FLUSH) 0.9 %
10 SYRINGE (ML) INJECTION AS NEEDED
Status: ACTIVE | OUTPATIENT
Start: 2024-05-09

## 2024-05-10 ENCOUNTER — OFFICE VISIT (OUTPATIENT)
Age: 84
End: 2024-05-10
Payer: MEDICARE

## 2024-05-10 VITALS
HEART RATE: 69 BPM | WEIGHT: 119.8 LBS | BODY MASS INDEX: 22.05 KG/M2 | SYSTOLIC BLOOD PRESSURE: 128 MMHG | HEIGHT: 62 IN | DIASTOLIC BLOOD PRESSURE: 80 MMHG

## 2024-05-10 DIAGNOSIS — R25.1 TREMOR: ICD-10-CM

## 2024-05-10 DIAGNOSIS — I50.22 CHRONIC SYSTOLIC CONGESTIVE HEART FAILURE (HCC): Primary | Chronic | ICD-10-CM

## 2024-05-10 DIAGNOSIS — N18.32 STAGE 3B CHRONIC KIDNEY DISEASE (HCC): Chronic | ICD-10-CM

## 2024-05-10 DIAGNOSIS — I48.19 PERSISTENT ATRIAL FIBRILLATION (HCC): Chronic | ICD-10-CM

## 2024-05-10 PROCEDURE — 1123F ACP DISCUSS/DSCN MKR DOCD: CPT | Performed by: INTERNAL MEDICINE

## 2024-05-10 PROCEDURE — 99214 OFFICE O/P EST MOD 30 MIN: CPT | Performed by: INTERNAL MEDICINE

## 2024-05-10 PROCEDURE — 93000 ELECTROCARDIOGRAM COMPLETE: CPT | Performed by: INTERNAL MEDICINE

## 2024-05-10 ASSESSMENT — ENCOUNTER SYMPTOMS: SHORTNESS OF BREATH: 0

## 2024-05-10 NOTE — PROGRESS NOTES
[address], [address]  [place] [address]  PHONE: [phone]    Kati Brandon  1940      SUBJECTIVE:   Kati Brandon is a 83 y.o. female seen for a follow up visit regarding the following:     Chief Complaint   Patient presents with    Congestive Heart Failure       HPI:    Patient presents for follow-up.  She was last seen in my office in October.  Multiple issues were addressed at today's visit.    Chronic systolic heart failure: Last echo showed improved LV function with EF 40 to 45%.  She continues to do well on minimal medical therapy which has been her wish.  She is on metoprolol, Lanoxin and low-dose furosemide.  She is also on Jardiance.  She denies any PND, orthopnea or edema.    Atrial fibrillation: Denies palpitation or tachycardia.  Heart rates well-controlled today on Toprol and Lanoxin.  Denies any neurologic symptoms consistent with TIA or stroke.    Chronic kidney disease: Last creatinine was 1.5.    Tremor: Recently seen by neurology.  [place] likely has Parkinson's disease.  Started on Sinemet without improvement.  Had MRI performed.  No evidence of stroke.  Noted to have cerebral atrophy and white matter disease.              Past Medical History, Past Surgical History, Family history, Social History, and Medications were all reviewed with the patient today and updated as necessary.           Current Outpatient Medications:     carbidopa-levodopa (SINEMET)  MG per tablet, Take 1 tablet by mouth 3 times daily, Disp: 90 tablet, Rfl: 5    vitamin D (ERGOCALCIFEROL) 1.25 MG (35036 UT) CAPS capsule, Take 1 capsule by mouth once a week, Disp: 12 capsule, Rfl: 0    digoxin (LANOXIN) 125 MCG tablet, Take 1 tablet by mouth daily, Disp: 90 tablet, Rfl: 1    empagliflozin (JARDIANCE) 10 MG tablet, Take 1 tablet by mouth daily, Disp: 90 tablet, Rfl: 1    metoprolol succinate (TOPROL XL) 25 MG extended release tablet, Take 1 tablet by mouth in the morning and

## 2024-06-18 ENCOUNTER — OFFICE VISIT (OUTPATIENT)
Dept: NEUROLOGY | Age: 84
End: 2024-06-18
Payer: MEDICARE

## 2024-06-18 VITALS — DIASTOLIC BLOOD PRESSURE: 68 MMHG | SYSTOLIC BLOOD PRESSURE: 118 MMHG | BODY MASS INDEX: 21.91 KG/M2 | HEIGHT: 62 IN

## 2024-06-18 DIAGNOSIS — F02.B0 MODERATE ALZHEIMER'S DEMENTIA WITHOUT BEHAVIORAL DISTURBANCE, PSYCHOTIC DISTURBANCE, MOOD DISTURBANCE, OR ANXIETY, UNSPECIFIED TIMING OF DEMENTIA ONSET (HCC): ICD-10-CM

## 2024-06-18 DIAGNOSIS — G20.C PRIMARY PARKINSONISM (HCC): Primary | ICD-10-CM

## 2024-06-18 DIAGNOSIS — R26.9 GAIT DISTURBANCE: ICD-10-CM

## 2024-06-18 DIAGNOSIS — R25.1 TREMOR: ICD-10-CM

## 2024-06-18 DIAGNOSIS — G30.9 MODERATE ALZHEIMER'S DEMENTIA WITHOUT BEHAVIORAL DISTURBANCE, PSYCHOTIC DISTURBANCE, MOOD DISTURBANCE, OR ANXIETY, UNSPECIFIED TIMING OF DEMENTIA ONSET (HCC): ICD-10-CM

## 2024-06-18 PROCEDURE — 1123F ACP DISCUSS/DSCN MKR DOCD: CPT

## 2024-06-18 PROCEDURE — 99215 OFFICE O/P EST HI 40 MIN: CPT

## 2024-06-18 RX ORDER — CARBIDOPA AND LEVODOPA 25; 100 MG/1; MG/1
1 TABLET, EXTENDED RELEASE ORAL 2 TIMES DAILY
Qty: 60 TABLET | Refills: 2 | Status: SHIPPED | OUTPATIENT
Start: 2024-06-18

## 2024-06-18 ASSESSMENT — ENCOUNTER SYMPTOMS
CHEST TIGHTNESS: 0
SORE THROAT: 0
ABDOMINAL PAIN: 0
CONSTIPATION: 0
VOICE CHANGE: 0
TROUBLE SWALLOWING: 0
EYE PAIN: 0
COUGH: 0

## 2024-06-18 NOTE — PROGRESS NOTES
patient on the importance of consistency pertaining to the following: daily routine, sleep hygiene, adequate hydration, minimal caffeine intake, physical activity/aerobic exercise, healthy coping tools/stress management, and adherence to medication regimen.   -Encouraged to contact the office with any questions or concerns.  -All questions were answered to the best of my ability.  -To follow up with Dr. Rodgers as scheduled.          Signature: MEREDITH Adams     [address], [place] [address]  Ph: [phone]  Fax: [phone]       I have personally interviewed and examined Kati Brandon is a 83 y.o. female and I have personally reviewed all relevant records including labs and imaging as noted above. I have written all aspects of this note. More than 50% of this time was used for counseling regarding my diagnosis, prognosis, and plans for management. Total visit time: 60 minutes.

## 2024-11-06 ENCOUNTER — OFFICE VISIT (OUTPATIENT)
Age: 84
End: 2024-11-06

## 2024-11-06 VITALS
HEART RATE: 60 BPM | DIASTOLIC BLOOD PRESSURE: 58 MMHG | HEIGHT: 62 IN | BODY MASS INDEX: 20.8 KG/M2 | WEIGHT: 113 LBS | SYSTOLIC BLOOD PRESSURE: 100 MMHG

## 2024-11-06 DIAGNOSIS — N18.32 STAGE 3B CHRONIC KIDNEY DISEASE (HCC): Chronic | ICD-10-CM

## 2024-11-06 DIAGNOSIS — I48.19 PERSISTENT ATRIAL FIBRILLATION (HCC): Chronic | ICD-10-CM

## 2024-11-06 DIAGNOSIS — I50.22 CHRONIC SYSTOLIC CONGESTIVE HEART FAILURE (HCC): Primary | ICD-10-CM

## 2024-11-06 DIAGNOSIS — D75.1 POLYCYTHEMIA: Chronic | ICD-10-CM

## 2024-11-06 ASSESSMENT — ENCOUNTER SYMPTOMS: SHORTNESS OF BREATH: 0

## 2024-11-06 NOTE — PROGRESS NOTES
Musculoskeletal:         General: No swelling.   Skin:     General: Skin is warm and dry.   Neurological:      General: No focal deficit present.   Psychiatric:         Mood and Affect: Mood normal.         Medical problems and test results were reviewed with the patient today.       Lab Results   Component Value Date    WBC 18.8 (H) 03/21/2024    HGB 17.4 (H) 03/21/2024    HCT 57.1 (H) 03/21/2024    MCV 91.5 03/21/2024     (H) 03/21/2024       Lab Results   Component Value Date/Time     03/21/2024 12:51 PM    K 4.8 03/21/2024 12:51 PM     03/21/2024 12:51 PM    CO2 25 03/21/2024 12:51 PM    BUN 18 03/21/2024 12:51 PM    CREATININE 1.50 03/21/2024 12:51 PM    GLUCOSE 97 03/21/2024 12:51 PM    CALCIUM 9.1 03/21/2024 12:51 PM        No results found for: \"CHOL\"  No results found for: \"TRIG\"  No results found for: \"HDL\"  No components found for: \"LDLCHOLESTEROL\", \"LDLCALC\"  No results found for: \"VLDL\"  No results found for: \"CHOLHDLRATIO\"     Data from outside records/labs from outside providers have been reviewed and summarized as noted in the HPI, past history and data review sections of this note       ASSESSMENT and PLAN    Kati was seen today for congestive heart failure.    Diagnoses and all orders for this visit:    Chronic systolic congestive heart failure (HCC)  -     Echo (TTE) complete (PRN contrast/bubble/strain/3D); Future    Persistent atrial fibrillation (HCC)  -     Echo (TTE) complete (PRN contrast/bubble/strain/3D); Future    Stage 3b chronic kidney disease (HCC)  -     Echo (TTE) complete (PRN contrast/bubble/strain/3D); Future    Polycythemia        Overall Impression    1. Chronic systolic congestive heart failure (HCC)  Felt likely at least partially tachycardia mediated.  Improved with heart rate control.  Check echo prior to next visit.  Continue Toprol, Demadex and Jardiance.  Call if any signs or symptoms of worsening volume overload.  Await labs planned with PCP.  -

## 2024-11-24 SDOH — HEALTH STABILITY: PHYSICAL HEALTH: ON AVERAGE, HOW MANY DAYS PER WEEK DO YOU ENGAGE IN MODERATE TO STRENUOUS EXERCISE (LIKE A BRISK WALK)?: 0 DAYS

## 2024-11-24 SDOH — ECONOMIC STABILITY: INCOME INSECURITY: HOW HARD IS IT FOR YOU TO PAY FOR THE VERY BASICS LIKE FOOD, HOUSING, MEDICAL CARE, AND HEATING?: NOT VERY HARD

## 2024-11-24 SDOH — ECONOMIC STABILITY: FOOD INSECURITY: WITHIN THE PAST 12 MONTHS, YOU WORRIED THAT YOUR FOOD WOULD RUN OUT BEFORE YOU GOT MONEY TO BUY MORE.: PATIENT DECLINED

## 2024-11-24 SDOH — ECONOMIC STABILITY: FOOD INSECURITY: WITHIN THE PAST 12 MONTHS, THE FOOD YOU BOUGHT JUST DIDN'T LAST AND YOU DIDN'T HAVE MONEY TO GET MORE.: PATIENT DECLINED

## 2024-11-24 SDOH — HEALTH STABILITY: PHYSICAL HEALTH: ON AVERAGE, HOW MANY MINUTES DO YOU ENGAGE IN EXERCISE AT THIS LEVEL?: 0 MIN

## 2024-11-24 ASSESSMENT — PATIENT HEALTH QUESTIONNAIRE - PHQ9
SUM OF ALL RESPONSES TO PHQ QUESTIONS 1-9: 0
SUM OF ALL RESPONSES TO PHQ QUESTIONS 1-9: 0
1. LITTLE INTEREST OR PLEASURE IN DOING THINGS: NOT AT ALL
SUM OF ALL RESPONSES TO PHQ9 QUESTIONS 1 & 2: 0
2. FEELING DOWN, DEPRESSED OR HOPELESS: NOT AT ALL
SUM OF ALL RESPONSES TO PHQ QUESTIONS 1-9: 0
SUM OF ALL RESPONSES TO PHQ QUESTIONS 1-9: 0

## 2024-11-24 ASSESSMENT — LIFESTYLE VARIABLES
HOW OFTEN DO YOU HAVE SIX OR MORE DRINKS ON ONE OCCASION: 1
HOW OFTEN DO YOU HAVE A DRINK CONTAINING ALCOHOL: 1
HOW OFTEN DO YOU HAVE A DRINK CONTAINING ALCOHOL: NEVER
HOW MANY STANDARD DRINKS CONTAINING ALCOHOL DO YOU HAVE ON A TYPICAL DAY: 0
HOW MANY STANDARD DRINKS CONTAINING ALCOHOL DO YOU HAVE ON A TYPICAL DAY: PATIENT DOES NOT DRINK

## 2024-11-27 ENCOUNTER — OFFICE VISIT (OUTPATIENT)
Dept: FAMILY MEDICINE CLINIC | Facility: CLINIC | Age: 84
End: 2024-11-27

## 2024-11-27 VITALS
BODY MASS INDEX: 20.06 KG/M2 | OXYGEN SATURATION: 95 % | HEART RATE: 83 BPM | SYSTOLIC BLOOD PRESSURE: 124 MMHG | WEIGHT: 109 LBS | DIASTOLIC BLOOD PRESSURE: 86 MMHG | HEIGHT: 62 IN

## 2024-11-27 DIAGNOSIS — H93.90 EAR LESION: ICD-10-CM

## 2024-11-27 DIAGNOSIS — E78.00 HYPERCHOLESTEREMIA: ICD-10-CM

## 2024-11-27 DIAGNOSIS — Z71.89 ACP (ADVANCE CARE PLANNING): ICD-10-CM

## 2024-11-27 DIAGNOSIS — I48.19 PERSISTENT ATRIAL FIBRILLATION (HCC): Chronic | ICD-10-CM

## 2024-11-27 DIAGNOSIS — I50.22 CHRONIC SYSTOLIC CONGESTIVE HEART FAILURE (HCC): Chronic | ICD-10-CM

## 2024-11-27 DIAGNOSIS — E55.9 VITAMIN D DEFICIENCY: ICD-10-CM

## 2024-11-27 DIAGNOSIS — Z00.00 MEDICARE ANNUAL WELLNESS VISIT, SUBSEQUENT: Primary | ICD-10-CM

## 2024-11-27 LAB
25(OH)D3 SERPL-MCNC: 23.4 NG/ML (ref 30–100)
ALBUMIN SERPL-MCNC: 3.6 G/DL (ref 3.2–4.6)
ALBUMIN/GLOB SERPL: 1.1 (ref 1–1.9)
ALP SERPL-CCNC: 114 U/L (ref 35–104)
ALT SERPL-CCNC: 18 U/L (ref 8–45)
ANION GAP SERPL CALC-SCNC: 15 MMOL/L (ref 7–16)
AST SERPL-CCNC: 39 U/L (ref 15–37)
BASOPHILS # BLD: 0.3 K/UL (ref 0–0.2)
BASOPHILS NFR BLD: 1 % (ref 0–2)
BILIRUB SERPL-MCNC: 2.1 MG/DL (ref 0–1.2)
BUN SERPL-MCNC: 13 MG/DL (ref 8–23)
CALCIUM SERPL-MCNC: 9.3 MG/DL (ref 8.8–10.2)
CHLORIDE SERPL-SCNC: 96 MMOL/L (ref 98–107)
CHOLEST SERPL-MCNC: 100 MG/DL (ref 0–200)
CO2 SERPL-SCNC: 25 MMOL/L (ref 20–29)
CREAT SERPL-MCNC: 2.1 MG/DL (ref 0.6–1.1)
DIFFERENTIAL METHOD BLD: ABNORMAL
EOSINOPHIL # BLD: 0.1 K/UL (ref 0–0.8)
EOSINOPHIL NFR BLD: 0 % (ref 0.5–7.8)
ERYTHROCYTE [DISTWIDTH] IN BLOOD BY AUTOMATED COUNT: 22.4 % (ref 11.9–14.6)
GLOBULIN SER CALC-MCNC: 3.1 G/DL (ref 2.3–3.5)
GLUCOSE SERPL-MCNC: 81 MG/DL (ref 70–99)
HCT VFR BLD AUTO: 61.6 % (ref 35.8–46.3)
HDLC SERPL-MCNC: 72 MG/DL (ref 40–60)
HDLC SERPL: 1.4 (ref 0–5)
HGB BLD-MCNC: 18.7 G/DL (ref 11.7–15.4)
IMM GRANULOCYTES # BLD AUTO: 0.1 K/UL (ref 0–0.5)
IMM GRANULOCYTES NFR BLD AUTO: 1 % (ref 0–5)
LDLC SERPL CALC-MCNC: 18 MG/DL (ref 0–100)
LYMPHOCYTES # BLD: 2.4 K/UL (ref 0.5–4.6)
LYMPHOCYTES NFR BLD: 12 % (ref 13–44)
MCH RBC QN AUTO: 28.3 PG (ref 26.1–32.9)
MCHC RBC AUTO-ENTMCNC: 30.4 G/DL (ref 31.4–35)
MCV RBC AUTO: 93.3 FL (ref 82–102)
MONOCYTES # BLD: 1.6 K/UL (ref 0.1–1.3)
MONOCYTES NFR BLD: 8 % (ref 4–12)
NEUTS SEG # BLD: 15.8 K/UL (ref 1.7–8.2)
NEUTS SEG NFR BLD: 78 % (ref 43–78)
NRBC # BLD: 0.02 K/UL (ref 0–0.2)
PLATELET # BLD AUTO: 565 K/UL (ref 150–450)
PMV BLD AUTO: 11.7 FL (ref 9.4–12.3)
POTASSIUM SERPL-SCNC: 5.3 MMOL/L (ref 3.5–5.1)
PROT SERPL-MCNC: 6.7 G/DL (ref 6.3–8.2)
RBC # BLD AUTO: 6.6 M/UL (ref 4.05–5.2)
SODIUM SERPL-SCNC: 136 MMOL/L (ref 136–145)
TRIGL SERPL-MCNC: 49 MG/DL (ref 0–150)
VLDLC SERPL CALC-MCNC: 10 MG/DL (ref 6–23)
WBC # BLD AUTO: 20.3 K/UL (ref 4.3–11.1)

## 2024-11-27 RX ORDER — DIGOXIN 125 MCG
125 TABLET ORAL DAILY
Qty: 90 TABLET | Refills: 3 | Status: SHIPPED | OUTPATIENT
Start: 2024-11-27 | End: 2025-11-27

## 2024-11-27 RX ORDER — METOPROLOL SUCCINATE 25 MG/1
25 TABLET, EXTENDED RELEASE ORAL 2 TIMES DAILY
Qty: 180 TABLET | Refills: 3 | Status: SHIPPED | OUTPATIENT
Start: 2024-11-27 | End: 2025-11-27

## 2024-11-27 RX ORDER — TORSEMIDE 10 MG/1
10 TABLET ORAL DAILY
Qty: 90 TABLET | Refills: 3 | Status: SHIPPED | OUTPATIENT
Start: 2024-11-27 | End: 2025-11-27

## 2024-11-27 ASSESSMENT — ENCOUNTER SYMPTOMS: RESPIRATORY NEGATIVE: 1

## 2024-11-27 NOTE — PROGRESS NOTES
PROGRESS NOTE    SUBJECTIVE:   Kati Brandon is a 83 y.o. female seen for a follow up visit regarding   Chief Complaint   Patient presents with    Medicare AWV     Right ear issue- outside of ear        HPI:  Here for follow-up of chronic problems and for AWV.  She continues to see cardiology for heart failure and atrial fibrillation, neurology for Parkinson's, oncology for myeloproliferative disorder.  Today the family is concerned about a sore on her right ear.  Patient has failed pharmacotherapy efforts to control her tremor, states they were told she is just going to have to live with the tremor.  Patient has declined treatment for her myeloproliferative disorder/polycythemia, may agree to therapeutic phlebotomy when needed.         Reviewed and updated this visit by provider:  Allergies  Meds  Problems           Review of Systems   Respiratory: Negative.     Cardiovascular: Negative.           OBJECTIVE:  Vitals:    11/27/24 1151   BP: 124/86   Site: Right Upper Arm   Position: Sitting   Cuff Size: Medium Adult   Pulse: 83   SpO2: 95%   Weight: 49.4 kg (109 lb)   Height: 1.575 m (5' 2\")        Physical Exam   General: Alert, oriented to person, course resting tremor  ENT: Eroding skin lesion on the top of the right outer ear  Neck: No JVD, thyromegaly or lymphadenopathy  Lungs: Fair airflow without rales or rhonchi  CVS: Irregularly irregular with normal rate, no murmur appreciated today  Neuro: Marked, coarse, resting tremor of the upper extremities and head    Medical problems and test results were reviewed with the patient today.     No results found for this or any previous visit (from the past 672 hour(s)).    No results found for any visits on 11/27/24.     ASSESSMENT and PLAN    1. Medicare annual wellness visit, subsequent  2. Chronic systolic congestive heart failure (HCC)  Overview:  1.  Echo (12/29/22):  EF 15-20%. Severe GHK.  RV dysfunction.  Severe TR/MR.  RVSP 47.    2.   Echo (2/16/2023):

## 2024-11-27 NOTE — PATIENT INSTRUCTIONS
vitamin D per day. It is possible to meet your calcium requirement with diet alone, but a vitamin D supplement is usually necessary to meet this goal.  When exposed to the sun, use a sunscreen that protects against both UVA and UVB radiation with an SPF of 30 or greater. Reapply every 2 to 3 hours or after sweating, drying off with a towel, or swimming.  Always wear a seat belt when traveling in a car. Always wear a helmet when riding a bicycle or motorcycle.  Advance Care Planning     Advance Care Planning opens a door to talk about and write down your wishes before a sudden accident or illness.  Make your goals, values, and preferences known.     This puts you in the ’s seat and helps others know what matters most to you so they won’t have to guess.      Where can you learn more?    Go to https://www.[employer]/patient-resources/advance-care-planning   to learn how to:    Name someone you trust to make healthcare decisions for you, only if you can’t. (Healthcare Power of )    Document your wishes for care if you were seriously ill and not expected to recover or are approaching end of life. (Advance Directive or Living Will)    The same page can be found using the QR code below.

## 2024-12-02 ENCOUNTER — TELEPHONE (OUTPATIENT)
Age: 84
End: 2024-12-02

## 2024-12-02 ENCOUNTER — TELEPHONE (OUTPATIENT)
Dept: FAMILY MEDICINE CLINIC | Facility: CLINIC | Age: 84
End: 2024-12-02

## 2024-12-02 NOTE — TELEPHONE ENCOUNTER
Normally Pt is  very regular and expected  Her  Period to last  for 4 days . Pt expected  to be  done with period  24 hours ago , but  6pm 2/18/18 til 8am 2/19/18 was changing pad every 15 minutes and passed a  have few blood  clots and  Very large blood  clot this morning . Sexual active and uses BCP   but   not consistently .   Currently : no pain or dizziness .   Triage guideline was Contraception - Birth control pills - peds ( no adult guideline found ) and disposition was  Have someone drive her to  [facility] but Pt didn't say what she would do .  .Rhonda Enrique RN [place] nurse advisors.    Reason for Disposition    SEVERE vaginal bleeding (i.e., soaking 2 pads or tampons per hour and present 2 or more hours)    Additional Information    Negative: [1] Vaginal bleeding AND [2] not on birth control pills    Negative: Vaginal discharge (other than bleeding) is main symptom    Negative: [1] SEVERE pain (e.g., excruciating) AND [2] present > 1 hour    Negative: [1] Pregnant AND [2] MODERATE-SEVERE abdominal pain    Negative: [1] Pregnant AND [2] MODERATE-SEVERE vaginal bleeding    Protocols used: CONTRACEPTION - BIRTH CONTROL PILLS-PEDIATRIC-     Patients daughter called and would like provider to review lab results and note in chart from cardiology

## 2024-12-02 NOTE — TELEPHONE ENCOUNTER
Spoke with daughter, Cathleen, Dr Jacobson's response given. Cathleen is going to call PCP and see if he will draw patient's labs in 2 weeks, she has not heard from PCP about results. Also see if patient needs to go back to hematology. Patient refused to take  chemo pill at last appointment and was pretty much discharged. Daughter will call back as needed.//cassie  Her blood counts are all elevated she has been seen by hematology in the past.  Last in March 2024.  If they are concerned I would go back to talk with them about this.  Her renal function is slightly worse.  Would recheck BMP in 2 weeks to follow-up

## 2024-12-02 NOTE — TELEPHONE ENCOUNTER
Hello,  I believe after Kati's last visit you ask us to let you know when she had labs so you could review. She had her yearly check with Dr. Silva and the labs you ordered were completed on November 27,2024.  Appears a lot of things are high and a few low, what does that mean?

## 2024-12-03 ENCOUNTER — TELEMEDICINE (OUTPATIENT)
Dept: FAMILY MEDICINE CLINIC | Facility: CLINIC | Age: 84
End: 2024-12-03

## 2024-12-03 ENCOUNTER — PATIENT MESSAGE (OUTPATIENT)
Dept: FAMILY MEDICINE CLINIC | Facility: CLINIC | Age: 84
End: 2024-12-03

## 2024-12-03 ENCOUNTER — APPOINTMENT (OUTPATIENT)
Dept: GENERAL RADIOLOGY | Age: 84
End: 2024-12-03
Payer: MEDICARE

## 2024-12-03 ENCOUNTER — APPOINTMENT (OUTPATIENT)
Dept: CT IMAGING | Age: 84
End: 2024-12-03
Payer: MEDICARE

## 2024-12-03 ENCOUNTER — HOSPITAL ENCOUNTER (EMERGENCY)
Age: 84
Discharge: HOME | End: 2024-12-04
Payer: MEDICARE

## 2024-12-03 VITALS
OXYGEN SATURATION: 93 % | HEIGHT: 62 IN | DIASTOLIC BLOOD PRESSURE: 96 MMHG | WEIGHT: 109 LBS | HEART RATE: 91 BPM | RESPIRATION RATE: 26 BRPM | TEMPERATURE: 97.7 F | BODY MASS INDEX: 20.06 KG/M2 | SYSTOLIC BLOOD PRESSURE: 142 MMHG

## 2024-12-03 DIAGNOSIS — N17.9 ACUTE KIDNEY INJURY (HCC): ICD-10-CM

## 2024-12-03 DIAGNOSIS — R91.8 PULMONARY NODULES: ICD-10-CM

## 2024-12-03 DIAGNOSIS — N17.9 AKI (ACUTE KIDNEY INJURY) (HCC): ICD-10-CM

## 2024-12-03 DIAGNOSIS — D72.829 LEUKOCYTOSIS, UNSPECIFIED TYPE: Primary | ICD-10-CM

## 2024-12-03 DIAGNOSIS — D46.9 MDS (MYELODYSPLASTIC SYNDROME) (HCC): ICD-10-CM

## 2024-12-03 DIAGNOSIS — D45 POLYCYTHEMIA VERA (HCC): Primary | ICD-10-CM

## 2024-12-03 DIAGNOSIS — S32.010A CLOSED COMPRESSION FRACTURE OF BODY OF L1 VERTEBRA (HCC): ICD-10-CM

## 2024-12-03 LAB
ALBUMIN SERPL-MCNC: 3.5 G/DL (ref 3.2–4.6)
ALBUMIN/GLOB SERPL: 1.1 (ref 1–1.9)
ALP SERPL-CCNC: 144 U/L (ref 35–104)
ALT SERPL-CCNC: 31 U/L (ref 8–45)
ANION GAP SERPL CALC-SCNC: 15 MMOL/L (ref 7–16)
APPEARANCE UR: CLEAR
AST SERPL-CCNC: 53 U/L (ref 15–37)
BACTERIA URNS QL MICRO: 0 /HPF
BASOPHILS # BLD: 0.3 K/UL (ref 0–0.2)
BASOPHILS NFR BLD: 1 % (ref 0–2)
BILIRUB SERPL-MCNC: 2 MG/DL (ref 0–1.2)
BILIRUB UR QL: NEGATIVE
BUN SERPL-MCNC: 28 MG/DL (ref 8–23)
CALCIUM SERPL-MCNC: 9.1 MG/DL (ref 8.8–10.2)
CASTS URNS QL MICRO: ABNORMAL /LPF
CHLORIDE SERPL-SCNC: 99 MMOL/L (ref 98–107)
CO2 SERPL-SCNC: 22 MMOL/L (ref 20–29)
COLOR UR: ABNORMAL
CREAT SERPL-MCNC: 2.32 MG/DL (ref 0.6–1.1)
DIFFERENTIAL METHOD BLD: ABNORMAL
EOSINOPHIL # BLD: 0.3 K/UL (ref 0–0.8)
EOSINOPHIL NFR BLD: 1 % (ref 0.5–7.8)
EPI CELLS #/AREA URNS HPF: ABNORMAL /HPF
ERYTHROCYTE [DISTWIDTH] IN BLOOD BY AUTOMATED COUNT: 21.6 % (ref 11.9–14.6)
GLOBULIN SER CALC-MCNC: 3.3 G/DL (ref 2.3–3.5)
GLUCOSE SERPL-MCNC: 103 MG/DL (ref 70–99)
GLUCOSE UR STRIP.AUTO-MCNC: 250 MG/DL
HCT VFR BLD AUTO: 61 % (ref 35.8–46.3)
HGB BLD-MCNC: 18.7 G/DL (ref 11.7–15.4)
HGB UR QL STRIP: NEGATIVE
IMM GRANULOCYTES # BLD AUTO: 0.3 K/UL (ref 0–0.5)
IMM GRANULOCYTES NFR BLD AUTO: 1 % (ref 0–5)
KETONES UR QL STRIP.AUTO: ABNORMAL MG/DL
LEUKOCYTE ESTERASE UR QL STRIP.AUTO: NEGATIVE
LYMPHOCYTES # BLD: 1.7 K/UL (ref 0.5–4.6)
LYMPHOCYTES NFR BLD: 5 % (ref 13–44)
MCH RBC QN AUTO: 26.9 PG (ref 26.1–32.9)
MCHC RBC AUTO-ENTMCNC: 30.7 G/DL (ref 31.4–35)
MCV RBC AUTO: 87.8 FL (ref 82–102)
MONOCYTES # BLD: 1.7 K/UL (ref 0.1–1.3)
MONOCYTES NFR BLD: 5 % (ref 4–12)
NEUTS SEG # BLD: 29.6 K/UL (ref 1.7–8.2)
NEUTS SEG NFR BLD: 87 % (ref 43–78)
NITRITE UR QL STRIP.AUTO: NEGATIVE
NRBC # BLD: 0 K/UL (ref 0–0.2)
OTHER OBSERVATIONS: ABNORMAL
PH UR STRIP: 5 (ref 5–9)
PLATELET # BLD AUTO: 696 K/UL (ref 150–450)
PLATELET COMMENT: ABNORMAL
PMV BLD AUTO: 10.7 FL (ref 9.4–12.3)
POTASSIUM SERPL-SCNC: 5.1 MMOL/L (ref 3.5–5.1)
POTASSIUM SERPL-SCNC: 5.5 MMOL/L (ref 3.5–5.1)
PROT SERPL-MCNC: 6.8 G/DL (ref 6.3–8.2)
PROT UR STRIP-MCNC: 30 MG/DL
RBC # BLD AUTO: 6.95 M/UL (ref 4.05–5.2)
RBC #/AREA URNS HPF: ABNORMAL /HPF
RBC MORPH BLD: ABNORMAL
SODIUM SERPL-SCNC: 136 MMOL/L (ref 136–145)
SP GR UR REFRACTOMETRY: 1.02 (ref 1–1.02)
UROBILINOGEN UR QL STRIP.AUTO: 0.2 EU/DL (ref 0.2–1)
WBC # BLD AUTO: 33.9 K/UL (ref 4.3–11.1)
WBC MORPH BLD: ABNORMAL
WBC URNS QL MICRO: ABNORMAL /HPF

## 2024-12-03 PROCEDURE — 96374 THER/PROPH/DIAG INJ IV PUSH: CPT

## 2024-12-03 PROCEDURE — 2500000003 HC RX 250 WO HCPCS: Performed by: PHYSICIAN ASSISTANT

## 2024-12-03 PROCEDURE — 96376 TX/PRO/DX INJ SAME DRUG ADON: CPT

## 2024-12-03 PROCEDURE — 72128 CT CHEST SPINE W/O DYE: CPT

## 2024-12-03 PROCEDURE — 2580000003 HC RX 258: Performed by: PHYSICIAN ASSISTANT

## 2024-12-03 PROCEDURE — 6370000000 HC RX 637 (ALT 250 FOR IP): Performed by: PHYSICIAN ASSISTANT

## 2024-12-03 PROCEDURE — 72072 X-RAY EXAM THORAC SPINE 3VWS: CPT

## 2024-12-03 PROCEDURE — 99285 EMERGENCY DEPT VISIT HI MDM: CPT

## 2024-12-03 PROCEDURE — 81001 URINALYSIS AUTO W/SCOPE: CPT

## 2024-12-03 PROCEDURE — 85025 COMPLETE CBC W/AUTO DIFF WBC: CPT

## 2024-12-03 PROCEDURE — 84132 ASSAY OF SERUM POTASSIUM: CPT

## 2024-12-03 PROCEDURE — 80053 COMPREHEN METABOLIC PANEL: CPT

## 2024-12-03 PROCEDURE — 73110 X-RAY EXAM OF WRIST: CPT

## 2024-12-03 PROCEDURE — 96361 HYDRATE IV INFUSION ADD-ON: CPT

## 2024-12-03 RX ORDER — OXYCODONE HYDROCHLORIDE 5 MG/1
2.5 TABLET ORAL
Status: COMPLETED | OUTPATIENT
Start: 2024-12-03 | End: 2024-12-03

## 2024-12-03 RX ORDER — OXYCODONE HYDROCHLORIDE AND ACETAMINOPHEN 325; 5 MG/5ML; MG/5ML
2.5 SOLUTION ORAL EVERY 6 HOURS PRN
Qty: 30 ML | Refills: 0 | Status: ON HOLD | OUTPATIENT
Start: 2024-12-03 | End: 2024-12-06

## 2024-12-03 RX ORDER — ACETAMINOPHEN 325 MG/1
650 TABLET ORAL
Status: COMPLETED | OUTPATIENT
Start: 2024-12-03 | End: 2024-12-03

## 2024-12-03 RX ORDER — MORPHINE SULFATE 2 MG/ML
2 INJECTION, SOLUTION INTRAMUSCULAR; INTRAVENOUS
Status: COMPLETED | OUTPATIENT
Start: 2024-12-03 | End: 2024-12-03

## 2024-12-03 RX ORDER — 0.9 % SODIUM CHLORIDE 0.9 %
1000 INTRAVENOUS SOLUTION INTRAVENOUS ONCE
Status: COMPLETED | OUTPATIENT
Start: 2024-12-03 | End: 2024-12-03

## 2024-12-03 RX ORDER — MORPHINE SULFATE 2 MG/ML
2 INJECTION, SOLUTION INTRAMUSCULAR; INTRAVENOUS ONCE
Status: COMPLETED | OUTPATIENT
Start: 2024-12-03 | End: 2024-12-03

## 2024-12-03 RX ORDER — HYDROCODONE BITARTRATE AND ACETAMINOPHEN 5; 325 MG/1; MG/1
1 TABLET ORAL EVERY 8 HOURS PRN
Qty: 10 TABLET | Refills: 0 | Status: SHIPPED | OUTPATIENT
Start: 2024-12-03 | End: 2024-12-03 | Stop reason: ALTCHOICE

## 2024-12-03 RX ADMIN — ACETAMINOPHEN 650 MG: 325 TABLET, FILM COATED ORAL at 23:21

## 2024-12-03 RX ADMIN — MORPHINE SULFATE 2 MG: 2 INJECTION, SOLUTION INTRAMUSCULAR; INTRAVENOUS at 19:25

## 2024-12-03 RX ADMIN — SODIUM CHLORIDE 1000 ML: 9 INJECTION, SOLUTION INTRAVENOUS at 19:52

## 2024-12-03 RX ADMIN — OXYCODONE 2.5 MG: 5 TABLET ORAL at 23:21

## 2024-12-03 RX ADMIN — MORPHINE SULFATE 2 MG: 2 INJECTION, SOLUTION INTRAMUSCULAR; INTRAVENOUS at 22:05

## 2024-12-03 ASSESSMENT — PAIN DESCRIPTION - LOCATION
LOCATION: BACK

## 2024-12-03 ASSESSMENT — ENCOUNTER SYMPTOMS
GASTROINTESTINAL NEGATIVE: 1
GASTROINTESTINAL NEGATIVE: 1
RESPIRATORY NEGATIVE: 1
BACK PAIN: 1

## 2024-12-03 ASSESSMENT — PAIN SCALES - GENERAL
PAINLEVEL_OUTOF10: 5
PAINLEVEL_OUTOF10: 3
PAINLEVEL_OUTOF10: 5

## 2024-12-03 ASSESSMENT — PAIN DESCRIPTION - DESCRIPTORS
DESCRIPTORS: SHARP
DESCRIPTORS: ACHING

## 2024-12-03 ASSESSMENT — PAIN - FUNCTIONAL ASSESSMENT: PAIN_FUNCTIONAL_ASSESSMENT: 0-10

## 2024-12-03 NOTE — ED TRIAGE NOTES
Per patient's daughter. Pcp instructed to be evaluated in ed for abnormal kidney function/K+. Patient states of mid-thoracic back pain x2 days. States of fall x3 days ago with no injury. States urinary frequency. Denies fever/chills.    Please let Abbie MB

## 2024-12-03 NOTE — PROGRESS NOTES
PROGRESS NOTE    SUBJECTIVE:   Kati Brandon is a 84 y.o. female seen for a follow up visit regarding   Chief Complaint   Patient presents with    Other     Discuss health        HPI:  Virtual audio and video visit today with patient/caregiver.  Recent labs show acceleration in bone marrow activity with increasing hemoglobin, increasing white blood cell count, increasing RBC.  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  Also a decline in kidney function, creatinine up significantly, potassium mildly elevated.  Oncology note has been reviewed prior to this conversation, of note patient declined urea therapy for polycythemia vera, myelodysplasia, declined phlebotomy at that time.  Since that time, patient's grandson who is a paramedic, gave her some IV fluids at home and daughter reports that urine production has picked up and is now much less dark than it was.         Reviewed and updated this visit by provider:           Review of Systems   Cardiovascular: Negative.    Gastrointestinal: Negative.           OBJECTIVE:  There were no vitals filed for this visit.     Physical Exam   Not available for evaluation usually.    Medical problems and test results were reviewed with the patient today.     Recent Results (from the past 672 hour(s))   Vitamin D 25 Hydroxy    Collection Time: 11/27/24 12:17 PM   Result Value Ref Range    Vit D, 25-Hydroxy 23.4 (L) 30.0 - 100.0 ng/mL   Lipid Panel    Collection Time: 11/27/24 12:17 PM   Result Value Ref Range    Cholesterol, Total 100 0 - 200 MG/DL    Triglycerides 49 0 - 150 MG/DL    HDL 72 (H) 40 - 60 MG/DL    LDL Cholesterol 18 0 - 100 MG/DL    VLDL Cholesterol Calculated 10 6 - 23 MG/DL    Chol/HDL Ratio 1.4 0.0 - 5.0     Comprehensive Metabolic Panel    Collection Time: 11/27/24 12:17 PM   Result Value Ref Range    Sodium 136 136 - 145 mmol/L    Potassium 5.3 (H) 3.5 - 5.1 mmol/L    Chloride 96 (L) 98 - 107 mmol/L    CO2 25 20 - 29 mmol/L    Anion Gap 15

## 2024-12-03 NOTE — TELEPHONE ENCOUNTER
Patients daughter contacted with message from provider; voiced understanding. Scheduled appointment for today at 11:20.

## 2024-12-04 ENCOUNTER — TELEPHONE (OUTPATIENT)
Dept: FAMILY MEDICINE CLINIC | Facility: CLINIC | Age: 84
End: 2024-12-04

## 2024-12-04 RX ORDER — OXYCODONE AND ACETAMINOPHEN 5; 325 MG/1; MG/1
1 TABLET ORAL EVERY 6 HOURS PRN
Qty: 12 TABLET | Refills: 0 | Status: ON HOLD | OUTPATIENT
Start: 2024-12-04 | End: 2024-12-07

## 2024-12-04 NOTE — ED NOTES
Patient mobility status  with mild difficulty.     I have reviewed discharge instructions with the patient and family.  The patient and family verbalized understanding.    Patient left ED via Discharge Method: wheelchair to Home with  daughter .    Opportunity for questions and clarification provided.     Patient given 1 scripts.

## 2024-12-04 NOTE — ED PROVIDER NOTES
metacarpal phalangeal joints digit 1  through digit 5.      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      Electronically signed by Elier Metz   CBC with Auto Differential   Result Value Ref Range    WBC 33.9 (H) 4.3 - 11.1 K/uL    RBC 6.95 (H) 4.05 - 5.2 M/uL    Hemoglobin 18.7 (H) 11.7 - 15.4 g/dL    Hematocrit 61.0 (H) 35.8 - 46.3 %    MCV 87.8 82.0 - 102.0 FL    MCH 26.9 26.1 - 32.9 PG    MCHC 30.7 (L) 31.4 - 35.0 g/dL    RDW 21.6 (H) 11.9 - 14.6 %    Platelets 696 (H) 150 - 450 K/uL    MPV 10.7 9.4 - 12.3 FL    nRBC 0.00 0.0 - 0.2 K/uL    Neutrophils % 87 (H) 43 - 78 %    Lymphocytes % 5 (L) 13 - 44 %    Monocytes % 5 4.0 - 12.0 %    Eosinophils % 1 0.5 - 7.8 %    Basophils % 1 0.0 - 2.0 %    Immature Granulocytes % 1 0.0 - 5.0 %    Neutrophils Absolute 29.6 (H) 1.7 - 8.2 K/UL    Lymphocytes Absolute 1.7 0.5 - 4.6 K/UL    Monocytes Absolute 1.7 (H) 0.1 - 1.3 K/UL    Eosinophils Absolute 0.3 0.0 - 0.8 K/UL    Basophils Absolute 0.3 (H) 0.0 - 0.2 K/UL    Immature Granulocytes Absolute 0.3 0.0 - 0.5 K/UL    RBC Comment NORMOCYTIC/NORMOCHROMIC      WBC Comment Result Confirmed By Smear      Platelet Comment INCREASED      Differential Type AUTOMATED     Comprehensive Metabolic Panel   Result Value Ref Range    Sodium 136 136 - 145 mmol/L    Potassium 5.5 (H) 3.5 - 5.1 mmol/L    Chloride 99 98 - 107 mmol/L    CO2 22 20 - 29 mmol/L    Anion Gap 15 7 - 16 mmol/L    Glucose 103 (H) 70 - 99 mg/dL    BUN 28 (H) 8 - 23 MG/DL    Creatinine 2.32 (H) 0.60 - 1.10 MG/DL    Est, Glom Filt Rate 20 (L) >60 ml/min/1.73m2    Calcium 9.1 8.8 - 10.2 MG/DL    Total Bilirubin 2.0 (H) 0.0 - 1.2 MG/DL    ALT 31 8 - 45 U/L    AST 53 (H) 15 - 37 U/L    Alk Phosphatase 144 (H) 35 - 104 U/L    Total Protein 6.8 6.3 - 8.2 g/dL    Albumin 3.5 3.2 - 4.6 g/dL    Globulin 3.3 2.3 - 3.5 g/dL    Albumin/Globulin Ratio 1.1 1.0 - 1.9     Urinalysis w rflx microscopic   Result Value Ref Range    Color, UA YELLOW/STRAW

## 2024-12-04 NOTE — DISCHARGE INSTRUCTIONS
Please call your hematologist, pulmonology, spine specialist, and your primary doctor for follow-up.  Return if worsening in any way.  Take stool softeners while on pain medication.

## 2024-12-04 NOTE — ED NOTES
Pt presents to the ED for c/o back pain and abnormal labs was advised to come to the ED for follow up for her increased BUN and creatinine

## 2024-12-04 NOTE — TELEPHONE ENCOUNTER
Pt;s daughter came in the office and request Dr. Silva place referral to Hospice ([facility]) would be Daughters preference.

## 2024-12-05 ENCOUNTER — HOSPITAL ENCOUNTER (INPATIENT)
Age: 84
LOS: 2 days | Discharge: NURSING, REHAB OR LONG-TERM CARE | DRG: 542 | End: 2024-12-10
Attending: STUDENT IN AN ORGANIZED HEALTH CARE EDUCATION/TRAINING PROGRAM | Admitting: FAMILY MEDICINE
Payer: MEDICARE

## 2024-12-05 ENCOUNTER — APPOINTMENT (OUTPATIENT)
Dept: GENERAL RADIOLOGY | Age: 84
DRG: 542 | End: 2024-12-05
Payer: MEDICARE

## 2024-12-05 ENCOUNTER — OFFICE VISIT (OUTPATIENT)
Age: 84
End: 2024-12-05
Payer: MEDICARE

## 2024-12-05 VITALS — WEIGHT: 110 LBS | BODY MASS INDEX: 20.24 KG/M2 | HEIGHT: 62 IN

## 2024-12-05 DIAGNOSIS — E55.9 VITAMIN D DEFICIENCY: ICD-10-CM

## 2024-12-05 DIAGNOSIS — F03.918 DEMENTIA WITH OTHER BEHAVIORAL DISTURBANCE, UNSPECIFIED DEMENTIA SEVERITY, UNSPECIFIED DEMENTIA TYPE (HCC): ICD-10-CM

## 2024-12-05 DIAGNOSIS — S32.010A CLOSED COMPRESSION FRACTURE OF BODY OF L1 VERTEBRA (HCC): Primary | ICD-10-CM

## 2024-12-05 DIAGNOSIS — S32.010A CLOSED COMPRESSION FRACTURE OF BODY OF FIRST LUMBAR VERTEBRA (HCC): ICD-10-CM

## 2024-12-05 DIAGNOSIS — I50.22 CHRONIC SYSTOLIC CONGESTIVE HEART FAILURE (HCC): Chronic | ICD-10-CM

## 2024-12-05 DIAGNOSIS — S32.010A COMPRESSION FRACTURE OF L1 VERTEBRA, INITIAL ENCOUNTER (HCC): Primary | ICD-10-CM

## 2024-12-05 PROBLEM — S12.9XXA COMPRESSION FRACTURE OF C-SPINE, INITIAL ENCOUNTER (HCC): Status: ACTIVE | Noted: 2024-12-05

## 2024-12-05 PROBLEM — S12.9XXA COMPRESSION FRACTURE OF C-SPINE, INITIAL ENCOUNTER (HCC): Status: RESOLVED | Noted: 2024-12-05 | Resolved: 2024-12-05

## 2024-12-05 PROBLEM — D47.1 MYELOPROLIFERATIVE DISEASE (HCC): Status: ACTIVE | Noted: 2024-12-05

## 2024-12-05 PROBLEM — N17.9 AKI (ACUTE KIDNEY INJURY) (HCC): Status: ACTIVE | Noted: 2024-12-05

## 2024-12-05 LAB
ALBUMIN SERPL-MCNC: 3.6 G/DL (ref 3.2–4.6)
ALBUMIN/GLOB SERPL: 1 (ref 1–1.9)
ALP SERPL-CCNC: 164 U/L (ref 35–104)
ALT SERPL-CCNC: 20 U/L (ref 8–45)
ANION GAP SERPL CALC-SCNC: 15 MMOL/L (ref 7–16)
AST SERPL-CCNC: 43 U/L (ref 15–37)
BASOPHILS # BLD: 0.3 K/UL (ref 0–0.2)
BASOPHILS NFR BLD: 1 % (ref 0–2)
BILIRUB SERPL-MCNC: 1.6 MG/DL (ref 0–1.2)
BUN SERPL-MCNC: 38 MG/DL (ref 8–23)
CALCIUM SERPL-MCNC: 8.8 MG/DL (ref 8.8–10.2)
CHLORIDE SERPL-SCNC: 105 MMOL/L (ref 98–107)
CO2 SERPL-SCNC: 18 MMOL/L (ref 20–29)
CREAT SERPL-MCNC: 2.3 MG/DL (ref 0.6–1.1)
DIFFERENTIAL METHOD BLD: ABNORMAL
EOSINOPHIL # BLD: 0 K/UL (ref 0–0.8)
EOSINOPHIL NFR BLD: 0 % (ref 0.5–7.8)
ERYTHROCYTE [DISTWIDTH] IN BLOOD BY AUTOMATED COUNT: 22 % (ref 11.9–14.6)
GLOBULIN SER CALC-MCNC: 3.5 G/DL (ref 2.3–3.5)
GLUCOSE SERPL-MCNC: 103 MG/DL (ref 70–99)
HCT VFR BLD AUTO: 60.7 % (ref 35.8–46.3)
HGB BLD-MCNC: 18.2 G/DL (ref 11.7–15.4)
IMM GRANULOCYTES # BLD AUTO: 0.2 K/UL (ref 0–0.5)
IMM GRANULOCYTES NFR BLD AUTO: 1 % (ref 0–5)
LYMPHOCYTES # BLD: 1.6 K/UL (ref 0.5–4.6)
LYMPHOCYTES NFR BLD: 6 % (ref 13–44)
MAGNESIUM SERPL-MCNC: 2.5 MG/DL (ref 1.8–2.4)
MCH RBC QN AUTO: 27.2 PG (ref 26.1–32.9)
MCHC RBC AUTO-ENTMCNC: 30 G/DL (ref 31.4–35)
MCV RBC AUTO: 90.9 FL (ref 82–102)
MONOCYTES # BLD: 1.9 K/UL (ref 0.1–1.3)
MONOCYTES NFR BLD: 7 % (ref 4–12)
NEUTS SEG # BLD: 25.2 K/UL (ref 1.7–8.2)
NEUTS SEG NFR BLD: 86 % (ref 43–78)
NRBC # BLD: 0 K/UL (ref 0–0.2)
NT PRO BNP: ABNORMAL PG/ML (ref 0–450)
PLATELET # BLD AUTO: 682 K/UL (ref 150–450)
PMV BLD AUTO: 10.7 FL (ref 9.4–12.3)
POTASSIUM SERPL-SCNC: 5 MMOL/L (ref 3.5–5.1)
PROCALCITONIN SERPL-MCNC: 7.09 NG/ML (ref 0–0.1)
PROT SERPL-MCNC: 7 G/DL (ref 6.3–8.2)
RBC # BLD AUTO: 6.68 M/UL (ref 4.05–5.2)
SODIUM SERPL-SCNC: 138 MMOL/L (ref 136–145)
WBC # BLD AUTO: 29.2 K/UL (ref 4.3–11.1)

## 2024-12-05 PROCEDURE — 2580000003 HC RX 258: Performed by: STUDENT IN AN ORGANIZED HEALTH CARE EDUCATION/TRAINING PROGRAM

## 2024-12-05 PROCEDURE — 83735 ASSAY OF MAGNESIUM: CPT

## 2024-12-05 PROCEDURE — 1159F MED LIST DOCD IN RCRD: CPT | Performed by: ORTHOPAEDIC SURGERY

## 2024-12-05 PROCEDURE — 87040 BLOOD CULTURE FOR BACTERIA: CPT

## 2024-12-05 PROCEDURE — 99285 EMERGENCY DEPT VISIT HI MDM: CPT

## 2024-12-05 PROCEDURE — 96375 TX/PRO/DX INJ NEW DRUG ADDON: CPT

## 2024-12-05 PROCEDURE — 96374 THER/PROPH/DIAG INJ IV PUSH: CPT

## 2024-12-05 PROCEDURE — G0378 HOSPITAL OBSERVATION PER HR: HCPCS

## 2024-12-05 PROCEDURE — 6370000000 HC RX 637 (ALT 250 FOR IP): Performed by: INTERNAL MEDICINE

## 2024-12-05 PROCEDURE — 6360000002 HC RX W HCPCS: Performed by: INTERNAL MEDICINE

## 2024-12-05 PROCEDURE — 6360000002 HC RX W HCPCS: Performed by: STUDENT IN AN ORGANIZED HEALTH CARE EDUCATION/TRAINING PROGRAM

## 2024-12-05 PROCEDURE — 85025 COMPLETE CBC W/AUTO DIFF WBC: CPT

## 2024-12-05 PROCEDURE — 72100 X-RAY EXAM L-S SPINE 2/3 VWS: CPT

## 2024-12-05 PROCEDURE — 51798 US URINE CAPACITY MEASURE: CPT

## 2024-12-05 PROCEDURE — 96361 HYDRATE IV INFUSION ADD-ON: CPT

## 2024-12-05 PROCEDURE — 2500000003 HC RX 250 WO HCPCS: Performed by: STUDENT IN AN ORGANIZED HEALTH CARE EDUCATION/TRAINING PROGRAM

## 2024-12-05 PROCEDURE — 96372 THER/PROPH/DIAG INJ SC/IM: CPT

## 2024-12-05 PROCEDURE — 96365 THER/PROPH/DIAG IV INF INIT: CPT

## 2024-12-05 PROCEDURE — 99205 OFFICE O/P NEW HI 60 MIN: CPT | Performed by: ORTHOPAEDIC SURGERY

## 2024-12-05 PROCEDURE — 83880 ASSAY OF NATRIURETIC PEPTIDE: CPT

## 2024-12-05 PROCEDURE — 36415 COLL VENOUS BLD VENIPUNCTURE: CPT

## 2024-12-05 PROCEDURE — 2580000003 HC RX 258: Performed by: INTERNAL MEDICINE

## 2024-12-05 PROCEDURE — 1123F ACP DISCUSS/DSCN MKR DOCD: CPT | Performed by: ORTHOPAEDIC SURGERY

## 2024-12-05 PROCEDURE — 80053 COMPREHEN METABOLIC PANEL: CPT

## 2024-12-05 PROCEDURE — 71045 X-RAY EXAM CHEST 1 VIEW: CPT

## 2024-12-05 PROCEDURE — 84145 PROCALCITONIN (PCT): CPT

## 2024-12-05 RX ORDER — SODIUM CHLORIDE 0.9 % (FLUSH) 0.9 %
5-40 SYRINGE (ML) INJECTION EVERY 12 HOURS SCHEDULED
Status: DISCONTINUED | OUTPATIENT
Start: 2024-12-05 | End: 2024-12-10 | Stop reason: HOSPADM

## 2024-12-05 RX ORDER — SODIUM CHLORIDE 9 MG/ML
INJECTION, SOLUTION INTRAVENOUS CONTINUOUS
Status: ACTIVE | OUTPATIENT
Start: 2024-12-05 | End: 2024-12-05

## 2024-12-05 RX ORDER — ONDANSETRON 2 MG/ML
4 INJECTION INTRAMUSCULAR; INTRAVENOUS EVERY 6 HOURS PRN
Status: DISCONTINUED | OUTPATIENT
Start: 2024-12-05 | End: 2024-12-10 | Stop reason: HOSPADM

## 2024-12-05 RX ORDER — POLYETHYLENE GLYCOL 3350 17 G/17G
17 POWDER, FOR SOLUTION ORAL DAILY PRN
Status: DISCONTINUED | OUTPATIENT
Start: 2024-12-05 | End: 2024-12-10 | Stop reason: HOSPADM

## 2024-12-05 RX ORDER — HEPARIN SODIUM 5000 [USP'U]/ML
5000 INJECTION, SOLUTION INTRAVENOUS; SUBCUTANEOUS 2 TIMES DAILY
Status: DISCONTINUED | OUTPATIENT
Start: 2024-12-05 | End: 2024-12-10 | Stop reason: HOSPADM

## 2024-12-05 RX ORDER — ONDANSETRON 4 MG/1
4 TABLET, ORALLY DISINTEGRATING ORAL EVERY 8 HOURS PRN
Status: DISCONTINUED | OUTPATIENT
Start: 2024-12-05 | End: 2024-12-10 | Stop reason: HOSPADM

## 2024-12-05 RX ORDER — OXYCODONE HYDROCHLORIDE 5 MG/1
10 TABLET ORAL EVERY 4 HOURS PRN
Status: DISCONTINUED | OUTPATIENT
Start: 2024-12-05 | End: 2024-12-10 | Stop reason: HOSPADM

## 2024-12-05 RX ORDER — SODIUM CHLORIDE 9 MG/ML
INJECTION, SOLUTION INTRAVENOUS PRN
Status: DISCONTINUED | OUTPATIENT
Start: 2024-12-05 | End: 2024-12-10 | Stop reason: HOSPADM

## 2024-12-05 RX ORDER — HYDROMORPHONE HYDROCHLORIDE 1 MG/ML
0.5 INJECTION, SOLUTION INTRAMUSCULAR; INTRAVENOUS; SUBCUTANEOUS
Status: COMPLETED | OUTPATIENT
Start: 2024-12-05 | End: 2024-12-05

## 2024-12-05 RX ORDER — MORPHINE SULFATE 4 MG/ML
4 INJECTION, SOLUTION INTRAMUSCULAR; INTRAVENOUS EVERY 4 HOURS PRN
Status: DISCONTINUED | OUTPATIENT
Start: 2024-12-05 | End: 2024-12-10 | Stop reason: HOSPADM

## 2024-12-05 RX ORDER — TORSEMIDE 20 MG/1
10 TABLET ORAL DAILY
Status: DISCONTINUED | OUTPATIENT
Start: 2024-12-05 | End: 2024-12-10 | Stop reason: HOSPADM

## 2024-12-05 RX ORDER — ONDANSETRON 2 MG/ML
4 INJECTION INTRAMUSCULAR; INTRAVENOUS
Status: COMPLETED | OUTPATIENT
Start: 2024-12-05 | End: 2024-12-05

## 2024-12-05 RX ORDER — ASPIRIN 81 MG/1
81 TABLET, CHEWABLE ORAL DAILY
Status: DISCONTINUED | OUTPATIENT
Start: 2024-12-05 | End: 2024-12-10 | Stop reason: HOSPADM

## 2024-12-05 RX ORDER — DIGOXIN 125 MCG
0.06 TABLET ORAL
Status: DISCONTINUED | OUTPATIENT
Start: 2024-12-05 | End: 2024-12-10 | Stop reason: HOSPADM

## 2024-12-05 RX ORDER — ACETAMINOPHEN 650 MG/1
650 SUPPOSITORY RECTAL EVERY 6 HOURS PRN
Status: DISCONTINUED | OUTPATIENT
Start: 2024-12-05 | End: 2024-12-10 | Stop reason: HOSPADM

## 2024-12-05 RX ORDER — ACETAMINOPHEN 325 MG/1
650 TABLET ORAL EVERY 6 HOURS PRN
Status: DISCONTINUED | OUTPATIENT
Start: 2024-12-05 | End: 2024-12-10 | Stop reason: HOSPADM

## 2024-12-05 RX ORDER — OXYCODONE HYDROCHLORIDE 5 MG/1
5 TABLET ORAL EVERY 4 HOURS PRN
Status: DISCONTINUED | OUTPATIENT
Start: 2024-12-05 | End: 2024-12-10 | Stop reason: HOSPADM

## 2024-12-05 RX ORDER — DIGOXIN 125 MCG
125 TABLET ORAL DAILY
Status: DISCONTINUED | OUTPATIENT
Start: 2024-12-05 | End: 2024-12-05

## 2024-12-05 RX ORDER — SODIUM CHLORIDE 0.9 % (FLUSH) 0.9 %
5-40 SYRINGE (ML) INJECTION PRN
Status: DISCONTINUED | OUTPATIENT
Start: 2024-12-05 | End: 2024-12-10 | Stop reason: HOSPADM

## 2024-12-05 RX ORDER — METOPROLOL SUCCINATE 25 MG/1
25 TABLET, EXTENDED RELEASE ORAL 2 TIMES DAILY
Status: DISCONTINUED | OUTPATIENT
Start: 2024-12-05 | End: 2024-12-10 | Stop reason: HOSPADM

## 2024-12-05 RX ADMIN — HEPARIN SODIUM 5000 UNITS: 5000 INJECTION INTRAVENOUS; SUBCUTANEOUS at 20:40

## 2024-12-05 RX ADMIN — HYDROMORPHONE HYDROCHLORIDE 0.5 MG: 1 INJECTION, SOLUTION INTRAMUSCULAR; INTRAVENOUS; SUBCUTANEOUS at 11:40

## 2024-12-05 RX ADMIN — AZITHROMYCIN MONOHYDRATE 500 MG: 500 INJECTION, POWDER, LYOPHILIZED, FOR SOLUTION INTRAVENOUS at 14:19

## 2024-12-05 RX ADMIN — METOPROLOL SUCCINATE 25 MG: 25 TABLET, EXTENDED RELEASE ORAL at 20:40

## 2024-12-05 RX ADMIN — ASPIRIN 81 MG: 81 TABLET, CHEWABLE ORAL at 18:03

## 2024-12-05 RX ADMIN — WATER 1000 MG: 1 INJECTION INTRAMUSCULAR; INTRAVENOUS; SUBCUTANEOUS at 14:14

## 2024-12-05 RX ADMIN — SODIUM CHLORIDE: 9 INJECTION, SOLUTION INTRAVENOUS at 14:19

## 2024-12-05 RX ADMIN — OXYCODONE 10 MG: 5 TABLET ORAL at 18:03

## 2024-12-05 RX ADMIN — DIGOXIN 0.06 MG: 125 TABLET ORAL at 18:35

## 2024-12-05 RX ADMIN — ONDANSETRON 4 MG: 2 INJECTION INTRAMUSCULAR; INTRAVENOUS at 11:40

## 2024-12-05 ASSESSMENT — PAIN DESCRIPTION - DESCRIPTORS
DESCRIPTORS: ACHING
DESCRIPTORS: ACHING

## 2024-12-05 ASSESSMENT — PAIN SCALES - GENERAL
PAINLEVEL_OUTOF10: 10
PAINLEVEL_OUTOF10: 7
PAINLEVEL_OUTOF10: 0

## 2024-12-05 ASSESSMENT — PAIN DESCRIPTION - LOCATION
LOCATION: BACK
LOCATION: BACK

## 2024-12-05 ASSESSMENT — PAIN - FUNCTIONAL ASSESSMENT
PAIN_FUNCTIONAL_ASSESSMENT: 0-10
PAIN_FUNCTIONAL_ASSESSMENT: ACTIVITIES ARE NOT PREVENTED

## 2024-12-05 ASSESSMENT — PAIN DESCRIPTION - ORIENTATION
ORIENTATION: MID
ORIENTATION: UPPER

## 2024-12-05 NOTE — ACP (ADVANCE CARE PLANNING)
Advance Care Planning   Healthcare Decision Maker:    Primary Decision Maker: Cathleen Sharma - Child - [phone]    Primary Decision Maker: Rajendra Brandon - Child - [phone]    Click here to complete Healthcare Decision Makers including selection of the Healthcare Decision Maker Relationship (ie \"Primary\").

## 2024-12-05 NOTE — H&P
Hospitalist History and Physical   Admit Date:  2024 10:40 AM   Name:  Kati Brandon   Age:  84 y.o.  Sex:  female  :  1940   MRN:  [id number]   Room:  ER    Presenting/Chief Complaint: Back Pain     Reason(s) for Admission: Compression fracture of C-spine, initial encounter ([facility]) [S12.9XXA]     History of Present Illness:   Kati Brandon is a 84 y.o. female with medical history of myeloproliferative disorder, polycythemia vera, persistent A-fib, CKD 3, chronic heart failure, Parkinson's who presented from her orthopedic surgeons office due to uncontrolled pain.  Patient recently had a fall several days ago resulting in a compression fracture of L1.  Patient was sent home with Percocet and told to follow-up with orthopedic surgery.  She went to her surgeon's office today and was unable to tolerate the visit due to significant pain.  Given her uncontrolled pain and her worsening appetite, she was directed to come to the ER for further help.    In the ER, patient was given Dilaudid with some pain control.  Patient was unable to give much history due to her pain as well as her waxing/waning memory issues due to her Parkinson's.  Daughter at bedside provided most of the history.  Prior to her fall, patient was ambulatory and functional.  Her memory however has been worsening over the last several months.  She has been following with palliative care.  Patient to be admitted to hospital service for pain control as well as possible rehab placement.    Extensive goals of care discussion was had at bedside with patient's daughter who is next of kin.  After discussion, patient to be full code at this time.      Assessment & Plan:     Compression fracture of L1, initial encounter  Patient presents with infection fracture.  Significant pain that is not well-controlled on oral medications.  -Start oxycodone 10 mg every 4 hours as needed as well as morphine 4 mg every 4 as needed for pain 7-10  -PT/OT  body, likely narrowing the spinal canal to some extent. A lumbar spine MRI can be obtained for complete characterization as deemed useful. 2. Degenerative findings are otherwise as detailed above. Electronically signed by SABI HAILE    CT SPINE THORAC LUMB WO CONTRAST    Result Date: 12/3/2024  CLINICAL HISTORY: Patient complains of mid thoracic back pain for 2 days. History of fall 3 days ago. TECHNIQUE: Axial, coronal and sagittal projection of the CT of the thoracic and lumbar spine are obtained. FINDINGS: There is a small to medium size right-sided pleural effusion with associated airspace disease. 3 mm multiple and smaller innumerable bibasilar nodules are noted. A 5 mm nodule at the right lung base is also present. There is ankylosing of the thoracic spine. There is an acute to subacute compression deformity of L1 vertebral body with mild to moderate central canal stenosis due to posterior buckling of the posterior wall of the vertebral body. Coronary arterial calcifications are present. 3.5 cm thoracic aortic aneurysm of the ascending thoracic aorta. Multiple thyroid calcifications. Sonographic correlation recommended     1. Likely acute L1 compression deformity with moderate posterior buckling of the posterior vertebral wall causing moderate canal stenosis. 2. Multiple bilateral pulmonary nodules. Further correlation is highly recommended with dedicated CT of the chest. A small to medium right-sided pleural effusion with associated airspace disease 3. 3.5 cm ascending thoracic aortic aneurysm. 4. Multiple thyroid calcifications. Sonographic correlation recommended. Electronically signed by Elier Metz    XR WRIST RIGHT (MIN 3 VIEWS)    Result Date: 12/3/2024  Right Wrist INDICATION: pain COMPARISON:  None TECHNIQUE: Three views of the right wrist were obtained. FINDINGS: Moderate to severe narrowing of the radiocarpal joint. Chondral calcification. Subchondral cystic change of the lunate. Moderate

## 2024-12-05 NOTE — ACP (ADVANCE CARE PLANNING)
Advance Care Planning Note   Admit Date:  2024 10:40 AM   Name:  Kati Brandon   Age:  84 y.o.  Sex:  female  :  1940   MRN:  [id number]   Room:  [facility]/    Kati Brandon has capacity to make her own decisions:   No    If pt unable to make decisions, POA/surrogate decision maker:  Daughter    Other people present:   Daughter    Patient / surrogate decision-maker directed code status:  Full Code    Other ACP topics discussed, if applicable:   Discussed possibility of hospice or comfort measures.      Patient or surrogate consented to discussion of the current conditions, workup, management plans, prognosis, and the risk for further deterioration.  Time spent: 22 minutes in direct discussion.      Signed:  Jaspal Gerard MD

## 2024-12-05 NOTE — ED NOTES
TRANSFER - OUT REPORT:    Verbal report given to Juan on Kati Brandon  being transferred to  70 for routine progression of patient care       Report consisted of patient's Situation, Background, Assessment and   Recommendations(SBAR).     Information from the following report(s) ED Encounter Summary, ED SBAR, MAR, and Recent Results was reviewed with the receiving nurse.    [place] Fall Assessment:    Presents to emergency department  because of falls (Syncope, seizure, or loss of consciousness): Yes  Age > 70: Yes  Altered Mental Status, Intoxication with alcohol or substance confusion (Disorientation, impaired judgment, poor safety awaremess, or inability to follow instructions): No  Impaired Mobility: Ambulates or transfers with assistive devices or assistance; Unable to ambulate or transer.: Yes             Lines:   Peripheral IV 12/05/24 Distal;Left;Posterior Forearm (Active)        Opportunity for questions and clarification was provided.      Patient transported with:  Jonathan Calderon RN  12/05/24 7507

## 2024-12-05 NOTE — CARE COORDINATION
Patient, ex- spouse and children have homes next to each other.  Patient currently lives with son and family assists in providing care.  Daughter is provided with information for [facility] worker to assist.  CM to follow clinical course and patient will likely need assistance in re-establishing services at home.  Family would like to restart palliative care and prefers [facility] as they have a hx with this agency.

## 2024-12-05 NOTE — PROGRESS NOTES
Occupational Therapy Note:    Attempted to see patient this PM for occupational therapy evaluation session. Pt s/p fall 12/1, in a lot of pain, not following all directions at this time. Patient being transferred to the floor from ER, Has L1 compression FX, being treated nonoperatively. TLSO brace is optional per Orthopedic MD note. Will follow and re-attempt as schedule permits/patient available. Thank you,    LULY LAMA, OT    Rehab Caseload Tracker

## 2024-12-05 NOTE — ED TRIAGE NOTES
Patient arrives to ED pov from home. Patient reports severe back pain. Patient had a fall coming into the house on Saturday. Patient went to see [facility] and had scans and has a confirmed closed compression fracture of L1. Patient was sent to ED for further evaluation.

## 2024-12-05 NOTE — PROGRESS NOTES
patient's daughter today and it sounds very clear that she is failing to thrive and it is challenging for her children to take care of her.  She is gone from being an independent ambulator to requiring wheelchair and bed rest at all times.  She is also becoming increasingly confused and has had pain control issues.  I think at this point, it is recommended in my opinion to have the patient present to the emergency department for debility.  Ideally she could be admitted by the hospitalist service for physical therapy, medical optimization, resource planning and palliative care consultation.  There has been some inquiry in the past about hospice care and these resources would also be available to the patient and the family while admitted.  I do not think that it is wise for her to discharge home and remained bedbound.  The daughter was in agreement and they will leave here and go to the emergency department [facility]    From a spine perspective, she does not need to wear the TLSO brace as this is likely very uncomfortable and cumbersome for her.  She is neuro intact and CT scan shows a stable compression deformity of L1.  I would like for her to get a upright lumbar plain film whenever possible from a pain and mobility standpoint.  There is no surgery indicated or planned at this time as again she would benefit most from resources for mobilization and medical management.  The orthopedic spine team is happy to be available while inpatient for any questions or concerns.  We will pursue closed treatment for her L1 compression fracture.    After patient discharge, whether that be with home health therapy or rehab placement-I I will plan on continuing to follow her outpatient.            Electronically signed by Mario Lucas Jr, MD  12/05/24  9:59 AM

## 2024-12-06 LAB
ANION GAP SERPL CALC-SCNC: 16 MMOL/L (ref 7–16)
BASOPHILS # BLD: 0.2 K/UL (ref 0–0.2)
BASOPHILS NFR BLD: 1 % (ref 0–2)
BUN SERPL-MCNC: 39 MG/DL (ref 8–23)
CALCIUM SERPL-MCNC: 8.4 MG/DL (ref 8.8–10.2)
CHLORIDE SERPL-SCNC: 106 MMOL/L (ref 98–107)
CO2 SERPL-SCNC: 16 MMOL/L (ref 20–29)
CREAT SERPL-MCNC: 2.18 MG/DL (ref 0.6–1.1)
DIFFERENTIAL METHOD BLD: ABNORMAL
EOSINOPHIL # BLD: 0 K/UL (ref 0–0.8)
EOSINOPHIL NFR BLD: 0 % (ref 0.5–7.8)
ERYTHROCYTE [DISTWIDTH] IN BLOOD BY AUTOMATED COUNT: 21.9 % (ref 11.9–14.6)
GLUCOSE SERPL-MCNC: 73 MG/DL (ref 70–99)
HCT VFR BLD AUTO: 56.1 % (ref 35.8–46.3)
HGB BLD-MCNC: 16.6 G/DL (ref 11.7–15.4)
IMM GRANULOCYTES # BLD AUTO: 0.2 K/UL (ref 0–0.5)
IMM GRANULOCYTES NFR BLD AUTO: 1 % (ref 0–5)
LYMPHOCYTES # BLD: 1.7 K/UL (ref 0.5–4.6)
LYMPHOCYTES NFR BLD: 7 % (ref 13–44)
MCH RBC QN AUTO: 27.1 PG (ref 26.1–32.9)
MCHC RBC AUTO-ENTMCNC: 29.6 G/DL (ref 31.4–35)
MCV RBC AUTO: 91.5 FL (ref 82–102)
MONOCYTES # BLD: 1.8 K/UL (ref 0.1–1.3)
MONOCYTES NFR BLD: 7 % (ref 4–12)
NEUTS SEG # BLD: 21.7 K/UL (ref 1.7–8.2)
NEUTS SEG NFR BLD: 85 % (ref 43–78)
NRBC # BLD: 0 K/UL (ref 0–0.2)
PLATELET # BLD AUTO: 603 K/UL (ref 150–450)
PMV BLD AUTO: 10.4 FL (ref 9.4–12.3)
POTASSIUM SERPL-SCNC: 5.4 MMOL/L (ref 3.5–5.1)
RBC # BLD AUTO: 6.13 M/UL (ref 4.05–5.2)
SODIUM SERPL-SCNC: 138 MMOL/L (ref 136–145)
WBC # BLD AUTO: 25.6 K/UL (ref 4.3–11.1)

## 2024-12-06 PROCEDURE — G0378 HOSPITAL OBSERVATION PER HR: HCPCS

## 2024-12-06 PROCEDURE — 97165 OT EVAL LOW COMPLEX 30 MIN: CPT

## 2024-12-06 PROCEDURE — 85025 COMPLETE CBC W/AUTO DIFF WBC: CPT

## 2024-12-06 PROCEDURE — 97530 THERAPEUTIC ACTIVITIES: CPT

## 2024-12-06 PROCEDURE — 80048 BASIC METABOLIC PNL TOTAL CA: CPT

## 2024-12-06 PROCEDURE — 36415 COLL VENOUS BLD VENIPUNCTURE: CPT

## 2024-12-06 PROCEDURE — 96361 HYDRATE IV INFUSION ADD-ON: CPT

## 2024-12-06 PROCEDURE — 2580000003 HC RX 258: Performed by: INTERNAL MEDICINE

## 2024-12-06 PROCEDURE — 99497 ADVNCD CARE PLAN 30 MIN: CPT | Performed by: INTERNAL MEDICINE

## 2024-12-06 PROCEDURE — 6360000002 HC RX W HCPCS: Performed by: INTERNAL MEDICINE

## 2024-12-06 PROCEDURE — 6370000000 HC RX 637 (ALT 250 FOR IP): Performed by: FAMILY MEDICINE

## 2024-12-06 PROCEDURE — 99222 1ST HOSP IP/OBS MODERATE 55: CPT | Performed by: INTERNAL MEDICINE

## 2024-12-06 PROCEDURE — 2580000003 HC RX 258: Performed by: FAMILY MEDICINE

## 2024-12-06 PROCEDURE — 97161 PT EVAL LOW COMPLEX 20 MIN: CPT

## 2024-12-06 PROCEDURE — 6370000000 HC RX 637 (ALT 250 FOR IP): Performed by: INTERNAL MEDICINE

## 2024-12-06 PROCEDURE — 97535 SELF CARE MNGMENT TRAINING: CPT

## 2024-12-06 PROCEDURE — 96372 THER/PROPH/DIAG INJ SC/IM: CPT

## 2024-12-06 RX ORDER — SODIUM BICARBONATE 650 MG/1
650 TABLET ORAL 2 TIMES DAILY
Status: DISCONTINUED | OUTPATIENT
Start: 2024-12-06 | End: 2024-12-10 | Stop reason: HOSPADM

## 2024-12-06 RX ORDER — LIDOCAINE 4 G/G
1 PATCH TOPICAL DAILY
Status: DISCONTINUED | OUTPATIENT
Start: 2024-12-06 | End: 2024-12-10 | Stop reason: HOSPADM

## 2024-12-06 RX ORDER — SENNOSIDES A AND B 8.6 MG/1
2 TABLET, FILM COATED ORAL 2 TIMES DAILY
Status: DISCONTINUED | OUTPATIENT
Start: 2024-12-06 | End: 2024-12-10 | Stop reason: HOSPADM

## 2024-12-06 RX ORDER — BISACODYL 5 MG/1
10 TABLET, DELAYED RELEASE ORAL ONCE
Status: DISCONTINUED | OUTPATIENT
Start: 2024-12-06 | End: 2024-12-10 | Stop reason: HOSPADM

## 2024-12-06 RX ORDER — SODIUM CHLORIDE 9 MG/ML
INJECTION, SOLUTION INTRAVENOUS CONTINUOUS
Status: ACTIVE | OUTPATIENT
Start: 2024-12-06 | End: 2024-12-07

## 2024-12-06 RX ADMIN — ASPIRIN 81 MG: 81 TABLET, CHEWABLE ORAL at 08:23

## 2024-12-06 RX ADMIN — ACETAMINOPHEN 650 MG: 325 TABLET ORAL at 09:02

## 2024-12-06 RX ADMIN — HEPARIN SODIUM 5000 UNITS: 5000 INJECTION INTRAVENOUS; SUBCUTANEOUS at 22:19

## 2024-12-06 RX ADMIN — OXYCODONE 5 MG: 5 TABLET ORAL at 13:15

## 2024-12-06 RX ADMIN — SODIUM ZIRCONIUM CYCLOSILICATE 10 G: 10 POWDER, FOR SUSPENSION ORAL at 09:03

## 2024-12-06 RX ADMIN — SODIUM CHLORIDE, PRESERVATIVE FREE 10 ML: 5 INJECTION INTRAVENOUS at 08:24

## 2024-12-06 RX ADMIN — SODIUM CHLORIDE: 9 INJECTION, SOLUTION INTRAVENOUS at 09:09

## 2024-12-06 RX ADMIN — EMPAGLIFLOZIN 10 MG: 10 TABLET, FILM COATED ORAL at 08:23

## 2024-12-06 RX ADMIN — SODIUM CHLORIDE: 9 INJECTION, SOLUTION INTRAVENOUS at 22:36

## 2024-12-06 RX ADMIN — SODIUM CHLORIDE, PRESERVATIVE FREE 10 ML: 5 INJECTION INTRAVENOUS at 22:31

## 2024-12-06 RX ADMIN — SODIUM BICARBONATE 650 MG TABLET 650 MG: at 09:03

## 2024-12-06 RX ADMIN — HEPARIN SODIUM 5000 UNITS: 5000 INJECTION INTRAVENOUS; SUBCUTANEOUS at 08:24

## 2024-12-06 RX ADMIN — SENNOSIDES 17.2 MG: 8.6 TABLET, FILM COATED ORAL at 22:22

## 2024-12-06 RX ADMIN — METOPROLOL SUCCINATE 25 MG: 25 TABLET, EXTENDED RELEASE ORAL at 08:23

## 2024-12-06 RX ADMIN — SODIUM BICARBONATE 650 MG TABLET 650 MG: at 22:18

## 2024-12-06 RX ADMIN — METOPROLOL SUCCINATE 25 MG: 25 TABLET, EXTENDED RELEASE ORAL at 22:31

## 2024-12-06 ASSESSMENT — PAIN - FUNCTIONAL ASSESSMENT
PAIN_FUNCTIONAL_ASSESSMENT: ACTIVITIES ARE NOT PREVENTED
PAIN_FUNCTIONAL_ASSESSMENT: ACTIVITIES ARE NOT PREVENTED

## 2024-12-06 ASSESSMENT — PAIN DESCRIPTION - PAIN TYPE
TYPE: ACUTE PAIN
TYPE: ACUTE PAIN

## 2024-12-06 ASSESSMENT — PAIN SCALES - GENERAL
PAINLEVEL_OUTOF10: 4
PAINLEVEL_OUTOF10: 0
PAINLEVEL_OUTOF10: 0
PAINLEVEL_OUTOF10: 6
PAINLEVEL_OUTOF10: 0
PAINLEVEL_OUTOF10: 3
PAINLEVEL_OUTOF10: 0

## 2024-12-06 ASSESSMENT — PAIN DESCRIPTION - LOCATION
LOCATION: BACK
LOCATION: BACK

## 2024-12-06 ASSESSMENT — PAIN DESCRIPTION - DESCRIPTORS
DESCRIPTORS: SHARP
DESCRIPTORS: ACHING

## 2024-12-06 ASSESSMENT — PAIN DESCRIPTION - ORIENTATION
ORIENTATION: POSTERIOR
ORIENTATION: POSTERIOR

## 2024-12-06 ASSESSMENT — PAIN DESCRIPTION - FREQUENCY
FREQUENCY: INTERMITTENT
FREQUENCY: INTERMITTENT

## 2024-12-06 ASSESSMENT — PAIN DESCRIPTION - ONSET
ONSET: GRADUAL
ONSET: GRADUAL

## 2024-12-06 NOTE — PROGRESS NOTES
CGA=Contact Guard Assistance,   Min=Minimal Assistance, Mod=Moderate Assistance, Max=Maximal Assistance, Total=Total Assistance, NT=Not Tested    GAIT: I Mod I S SBA CGA Min Mod Max Total  NT x2 Comments:   Level of Assistance [] [] [] [] [] [x] [] [] [] [] [x]    Distance 3 feet    DME Rolling Walker    Gait Quality Decreased felix , Decreased step length, Trunk flexion, and Trunk sway increased    Weightbearing Status      Stairs      I=Independent, Mod I=Modified Independent, S=Supervision, SBA=Standby Assistance, CGA=Contact Guard Assistance,   Min=Minimal Assistance, Mod=Moderate Assistance, Max=Maximal Assistance, Total=Total Assistance, NT=Not Tested    PLAN:   FREQUENCY AND DURATION: 3 times/week for duration of hospital stay or until stated goals are met, whichever comes first.    THERAPY PROGNOSIS: Good    PROBLEM LIST:   (Skilled intervention is medically necessary to address:)  Decreased ADL/Functional Activities  Decreased Activity Tolerance  Decreased AROM/PROM  Decreased Balance  Decreased Cognition  Decreased Gait Ability  Decreased Safety Awareness  Decreased Strength  Decreased Transfer Abilities  Increased Pain INTERVENTIONS PLANNED:   (Benefits and precautions of physical therapy have been discussed with the patient.)  Self Care Training  Therapeutic Activity  Therapeutic Exercise/HEP  Neuromuscular Re-education  Gait Training  Education       TREATMENT:   EVALUATION: LOW COMPLEXITY: (Untimed Charge)  The initial evaluation charge encompasses clinical chart review, objective assessment, interpretation of assessment, and skilled monitoring of the patient's response to treatment in order to develop a plan of care.     TREATMENT:   Co-Treatment PT/OT necessary due to patient's decreased overall endurance/tolerance levels, as well as need for high level skilled assistance to complete functional transfers/mobility and functional tasks  Therapeutic Activity (11 Minutes): Therapeutic activity included

## 2024-12-06 NOTE — PROGRESS NOTES
Nutrition Assessment  Assessment Type: Initial, Positive nutrition screen  Reason for visit:  Best Practice Alert: Malnutrition Screening Tool   Malnutrition Screening Tool Score: 3    Nutrition Intervention:   Food and/or Nutrient Delivery:   Meals and Snacks:  Diet: Continue current order  Medical Food Supplements:   Medical food supplement therapy:  Change Nepro with Carbsteady (renal oral supplement) 420 calories, 19 grams protein per 8 ounce serving     Malnutrition Assessment:  Malnutrition Status: At risk for malnutrition    Nutrition Focused Physical Exam: Skewed due to neurological deficits  Nutrition Assessment:  Food/Nutrition Related History: Pt reports she eats 2 meals per day. She stated she snacks a lot at home. She denies any recent changes in intake. Pt reports she drinks Boost at home. Granddaughter stated pt lives with her son who cares for her.      Do You Have Any Cultural, Orthodoxy, or Ethnic Food Preferences?: No   Weight History: Pt reports she has lost a little bit of wt. Granddaughter stated pt has lost ~20lbs in the past 2 years. Per EMR wt hx review 1/4 110lb (oncology), 3/21 115lb (oncology), 5/10 120lb (cardiology), 11/6 113lb (cardiology).   Nutrition Background:       PMH significant for myeloproliferative disorder, polycythemia vera, afib, CKD3, CHF, and Parkinson's disease. Pt admitted with L1 compression fracture.   Nutrition Monitoring/Evaluation:  Pt seen sitting in recliner with granddaughter and great granddaughter present. Pt reports hx as above. She stated she is eating well but later tells me she is eating less than half of her meals. Lunch arrived at visit, RD assisted pt with meal set up. She stated she did not want her lunch as she was not hungry. She did want her Ensure, RD assisted pt. Pt stated she drank her Ensure last night. Pt is agreeable to switch to nepro with elevated K this am.     Lab Results   Component Value Date/Time    K 5.4 12/06/2024 04:09 AM    K 5.0

## 2024-12-06 NOTE — THERAPY EVALUATION
ACUTE OCCUPATIONAL THERAPY GOALS:   (Developed with and agreed upon by patient and/or caregiver.)  1. Patient will complete lower body bathing and dressing with CONTACT GUARD ASSIST and adaptive equipment as needed.   2. Patient will complete functional transfers with STANDBY ASSIST and adaptive equipment as needed.   3. Patient will complete toileting and toilet transfer with STANDBY ASSIST.   4. Patient will complete functional mobility of household distances with STANDBY ASSIST and adaptive equipment as needed.   5. Patient will demonstrate ability to log roll in bed with STANDBY ASSIST and no verbal cues from therapist.     Timeframe: 7 visits      OCCUPATIONAL THERAPY Initial Assessment, Daily Note, and AM       OT Visit Days: 1  Acknowledge Orders  Time  OT Charge Capture  Rehab Caseload Tracker      Kati Brandon is a 84 y.o. female   PRIMARY DIAGNOSIS: Compression fracture of first lumbar vertebra ([facility])  Compression fracture of C-spine, initial encounter ([facility]) [S12.9XXA]  Compression fracture of L1 vertebra, initial encounter ([facility]) [S32.010A]  Dementia with other behavioral disturbance, unspecified dementia severity, unspecified dementia type ([facility]) [F03.918]       Reason for Referral: Generalized Muscle Weakness (M62.81)  Other lack of cordination (R27.8)  Difficulty in walking, Not elsewhere classified (R26.2)  Other abnormalities of gait and mobility (R26.89)  History of falling (Z91.81)  Observation: Payor: [facility] MEDICARE / Plan: LTAC, [facility] within [facility] MEDICARE ADVANTAGE / Product Type: *No Product type* /     ASSESSMENT:     REHAB RECOMMENDATIONS:   Recommendation to date pending progress:  Setting:  Short-term Rehab    Equipment:    To Be Determined     ASSESSMENT:  Ms. Brandon is a 85 y/o female who presents to the hospital after a fall, resulting in L1 compression fracture. Per orthopaedic surgery, there is no surgery indicated or planned at this time. PMHx Parkinson's, A-fib, CKD 3, chronic heart failure. Per  therapy, strategies to improve safety, proper use of assistive device, and transfer training and safety and patient will need reinforcement at next session.     TREATMENT GRID:  N/A    AFTER TREATMENT PRECAUTIONS: Alarm Activated, Bed/Chair Locked, Call light within reach, Chair, Needs within reach, and RN notified    INTERDISCIPLINARY COLLABORATION:  RN/ PCT, PT/ PTA, and OT/ NOGUEIRA    EDUCATION:  Education Given To: Patient  Education Provided: Role of Therapy;Plan of Care;Transfer Training  Education Method: Verbal;Demonstration  Barriers to Learning: Cognition  Education Outcome: Continued education needed    TOTAL TREATMENT DURATION AND TIME:  Time In: 0831  Time Out: 0851  Minutes: 20    TRAVIS PARKS, OT

## 2024-12-06 NOTE — PROGRESS NOTES
Albumin/Globulin Ratio 1.0 1.0 - 1.9     Magnesium    Collection Time: 12/05/24 11:28 AM   Result Value Ref Range    Magnesium 2.5 (H) 1.8 - 2.4 mg/dL   Brain Natriuretic Peptide    Collection Time: 12/05/24 11:28 AM   Result Value Ref Range    NT Pro-BNP 25,502 (H) 0 - 450 PG/ML   Procalcitonin    Collection Time: 12/05/24 11:28 AM   Result Value Ref Range    Procalcitonin 7.09 (H) 0.00 - 0.10 ng/mL   Culture, Blood 1    Collection Time: 12/05/24  2:14 PM    Specimen: Blood   Result Value Ref Range    Special Requests RIGHT  ARM        Culture NO GROWTH AFTER 16 HOURS     Culture, Blood 1    Collection Time: 12/05/24  2:27 PM    Specimen: Blood   Result Value Ref Range    Special Requests LEFT  HAND        Culture NO GROWTH AFTER 16 HOURS     Basic Metabolic Panel w/ Reflex to MG    Collection Time: 12/06/24  4:09 AM   Result Value Ref Range    Sodium 138 136 - 145 mmol/L    Potassium 5.4 (H) 3.5 - 5.1 mmol/L    Chloride 106 98 - 107 mmol/L    CO2 16 (L) 20 - 29 mmol/L    Anion Gap 16 7 - 16 mmol/L    Glucose 73 70 - 99 mg/dL    BUN 39 (H) 8 - 23 MG/DL    Creatinine 2.18 (H) 0.60 - 1.10 MG/DL    Est, Glom Filt Rate 22 (L) >60 ml/min/1.73m2    Calcium 8.4 (L) 8.8 - 10.2 MG/DL   CBC with Auto Differential    Collection Time: 12/06/24  4:09 AM   Result Value Ref Range    WBC 25.6 (H) 4.3 - 11.1 K/uL    RBC 6.13 (H) 4.05 - 5.2 M/uL    Hemoglobin 16.6 (H) 11.7 - 15.4 g/dL    Hematocrit 56.1 (H) 35.8 - 46.3 %    MCV 91.5 82 - 102 FL    MCH 27.1 26.1 - 32.9 PG    MCHC 29.6 (L) 31.4 - 35.0 g/dL    RDW 21.9 (H) 11.9 - 14.6 %    Platelets 603 (H) 150 - 450 K/uL    MPV 10.4 9.4 - 12.3 FL    nRBC 0.00 0.0 - 0.2 K/uL    Differential Type AUTOMATED      Neutrophils % 85 (H) 43 - 78 %    Lymphocytes % 7 (L) 13 - 44 %    Monocytes % 7 4.0 - 12.0 %    Eosinophils % 0 (L) 0.5 - 7.8 %    Basophils % 1 0.0 - 2.0 %    Immature Granulocytes % 1 0.0 - 5.0 %    Neutrophils Absolute 21.7 (H) 1.7 - 8.2 K/UL    Lymphocytes Absolute 1.7 0.5 -  hide

## 2024-12-06 NOTE — PLAN OF CARE
Problem: Chronic Conditions and Co-morbidities  Goal: Patient's chronic conditions and co-morbidity symptoms are monitored and maintained or improved  12/6/2024 0258 by Gail Gutierrez RN  Outcome: Progressing  Flowsheets (Taken 12/5/2024 1955)  Care Plan - Patient's Chronic Conditions and Co-Morbidity Symptoms are Monitored and Maintained or Improved:   Monitor and assess patient's chronic conditions and comorbid symptoms for stability, deterioration, or improvement   Collaborate with multidisciplinary team to address chronic and comorbid conditions and prevent exacerbation or deterioration   Update acute care plan with appropriate goals if chronic or comorbid symptoms are exacerbated and prevent overall improvement and discharge  12/5/2024 1852 by Juan Skaggs RN  Outcome: Progressing     Problem: Discharge Planning  Goal: Discharge to home or other facility with appropriate resources  12/6/2024 0258 by Gail Gutierrez RN  Outcome: Progressing  Flowsheets (Taken 12/5/2024 1955)  Discharge to home or other facility with appropriate resources:   Identify barriers to discharge with patient and caregiver   Arrange for needed discharge resources and transportation as appropriate   Identify discharge learning needs (meds, wound care, etc)   Refer to discharge planning if patient needs post-hospital services based on physician order or complex needs related to functional status, cognitive ability or social support system  12/5/2024 1852 by Juan Skaggs RN  Outcome: Progressing     Problem: Pain  Goal: Verbalizes/displays adequate comfort level or baseline comfort level  12/6/2024 0258 by Gail Gutierrez RN  Outcome: Progressing  Flowsheets (Taken 12/5/2024 1952)  Verbalizes/displays adequate comfort level or baseline comfort level:   Encourage patient to monitor pain and request assistance   Assess pain using appropriate pain scale   Administer analgesics based on type and severity of pain and evaluate response  12/5/2024  elimination, sleep and activity  6. If unable to ensure safety without constant attention obtain sitter and review sitter guidelines with assigned personnel  7. Initiate Psychosocial CNS and Spiritual Care consult, as indicated  12/6/2024 0258 by Gail Gutierrez, RN  Outcome: Progressing  Flowsheets (Taken 12/5/2024 1955)  Effect of thought disturbance (confusion, delirium, dementia, or psychosis) are managed with adequate functional status:   Assess for contributors to thought disturbance, including medications, impaired vision or hearing, underlying metabolic abnormalities, dehydration, psychiatric diagnoses, notify LIP   [facility] high risk fall precautions, as indicated   Provide frequent short contacts to provide reality reorientation, refocusing and direction   Decrease environmental stimuli, including noise as appropriate  12/5/2024 1852 by Juan Skaggs, RN  Outcome: Progressing

## 2024-12-06 NOTE — CONSULTS
Patient: Kati Brandon MRN: [id number]  SSN: [id number]    YOB: 1940  Age: 84 y.o.  Sex: female       Date of Request: 12/6/2024  Date of Consult:  12/6/2024  Reason for Consult:  pain and symptom management, family support and education, and medical decision making  Requesting Physician: Dr. Gerard     Assessment/Plan:     Principal Diagnosis:    Pain, back  M54.9    Additional Diagnoses:   Altered Mental Status R41.82  Debility, Unspecified  R53.81  Frailty  R54  Counseling, Encounter for Medical Advice  Z71.9  Encounter for Palliative Care  Z51.5    Palliative Performance Scale (PPS):       Medical Decision Making:   Reviewed and summarized labs and imaging from admission.  Pt out of bed to chair this am.  Notes some pain in lower back ongoing.  Some confusion noted.  Attempted to reach pt daughter but phone goes straight to voicemail.  Called pt son and provided update.  Discussed concerns as pt with advanced renal disease and overall debility.  Son notes pt goes through periods where she does not eat or drink and will spit out her food.  I discussed hospice and explained if she stops eating and drinking she will likely decline within a matter of days given current renal function.    Will add lidoderm patch to lower back in hopes of providing some additional relief.  Would be cautious with additional opioids given elderly and falls.      I spent greater than 25 minutes on advanced care planning.      Will discuss findings with members of the interdisciplinary team.      Thank you for this referral.         Subjective:     History obtained from:  Family and Chart    Chief Complaint: lower back pain  History of Present Illness:   84 y.o. female with medical history of myeloproliferative disorder, polycythemia vera, persistent A-fib, CKD 3, chronic heart failure, Parkinson's who presented from her orthopedic surgeons office due to uncontrolled pain.  Patient recently had a fall several days ago

## 2024-12-07 LAB
ANION GAP SERPL CALC-SCNC: 16 MMOL/L (ref 7–16)
BASOPHILS # BLD: 0.2 K/UL (ref 0–0.2)
BASOPHILS NFR BLD: 1 % (ref 0–2)
BUN SERPL-MCNC: 36 MG/DL (ref 8–23)
CALCIUM SERPL-MCNC: 8.6 MG/DL (ref 8.8–10.2)
CHLORIDE SERPL-SCNC: 106 MMOL/L (ref 98–107)
CO2 SERPL-SCNC: 17 MMOL/L (ref 20–29)
CREAT SERPL-MCNC: 2.12 MG/DL (ref 0.6–1.1)
DIFFERENTIAL METHOD BLD: ABNORMAL
EOSINOPHIL # BLD: 0.1 K/UL (ref 0–0.8)
EOSINOPHIL NFR BLD: 0 % (ref 0.5–7.8)
ERYTHROCYTE [DISTWIDTH] IN BLOOD BY AUTOMATED COUNT: 22.5 % (ref 11.9–14.6)
GLUCOSE SERPL-MCNC: 108 MG/DL (ref 70–99)
HCT VFR BLD AUTO: 56.9 % (ref 35.8–46.3)
HGB BLD-MCNC: 17.3 G/DL (ref 11.7–15.4)
IMM GRANULOCYTES # BLD AUTO: 0.2 K/UL (ref 0–0.5)
IMM GRANULOCYTES NFR BLD AUTO: 1 % (ref 0–5)
LYMPHOCYTES # BLD: 1.4 K/UL (ref 0.5–4.6)
LYMPHOCYTES NFR BLD: 5 % (ref 13–44)
MCH RBC QN AUTO: 28.3 PG (ref 26.1–32.9)
MCHC RBC AUTO-ENTMCNC: 30.4 G/DL (ref 31.4–35)
MCV RBC AUTO: 93 FL (ref 82–102)
MONOCYTES # BLD: 2 K/UL (ref 0.1–1.3)
MONOCYTES NFR BLD: 8 % (ref 4–12)
NEUTS SEG # BLD: 21.9 K/UL (ref 1.7–8.2)
NEUTS SEG NFR BLD: 85 % (ref 43–78)
NRBC # BLD: 0 K/UL (ref 0–0.2)
PLATELET # BLD AUTO: 619 K/UL (ref 150–450)
PMV BLD AUTO: 10.3 FL (ref 9.4–12.3)
POTASSIUM SERPL-SCNC: 4.5 MMOL/L (ref 3.5–5.1)
PROCALCITONIN SERPL-MCNC: 2.16 NG/ML (ref 0–0.1)
RBC # BLD AUTO: 6.12 M/UL (ref 4.05–5.2)
SODIUM SERPL-SCNC: 138 MMOL/L (ref 136–145)
WBC # BLD AUTO: 25.8 K/UL (ref 4.3–11.1)

## 2024-12-07 PROCEDURE — 6370000000 HC RX 637 (ALT 250 FOR IP): Performed by: INTERNAL MEDICINE

## 2024-12-07 PROCEDURE — 84145 PROCALCITONIN (PCT): CPT

## 2024-12-07 PROCEDURE — 85025 COMPLETE CBC W/AUTO DIFF WBC: CPT

## 2024-12-07 PROCEDURE — 6360000002 HC RX W HCPCS: Performed by: INTERNAL MEDICINE

## 2024-12-07 PROCEDURE — 96372 THER/PROPH/DIAG INJ SC/IM: CPT

## 2024-12-07 PROCEDURE — 2580000003 HC RX 258: Performed by: INTERNAL MEDICINE

## 2024-12-07 PROCEDURE — 36415 COLL VENOUS BLD VENIPUNCTURE: CPT

## 2024-12-07 PROCEDURE — G0378 HOSPITAL OBSERVATION PER HR: HCPCS

## 2024-12-07 PROCEDURE — 6370000000 HC RX 637 (ALT 250 FOR IP): Performed by: FAMILY MEDICINE

## 2024-12-07 PROCEDURE — 80048 BASIC METABOLIC PNL TOTAL CA: CPT

## 2024-12-07 RX ADMIN — SODIUM BICARBONATE 650 MG TABLET 650 MG: at 21:28

## 2024-12-07 RX ADMIN — SODIUM BICARBONATE 650 MG TABLET 650 MG: at 08:36

## 2024-12-07 RX ADMIN — OXYCODONE 5 MG: 5 TABLET ORAL at 10:36

## 2024-12-07 RX ADMIN — SODIUM CHLORIDE, PRESERVATIVE FREE 10 ML: 5 INJECTION INTRAVENOUS at 08:36

## 2024-12-07 RX ADMIN — OXYCODONE 5 MG: 5 TABLET ORAL at 17:32

## 2024-12-07 RX ADMIN — EMPAGLIFLOZIN 10 MG: 10 TABLET, FILM COATED ORAL at 08:36

## 2024-12-07 RX ADMIN — SENNOSIDES 17.2 MG: 8.6 TABLET, FILM COATED ORAL at 08:35

## 2024-12-07 RX ADMIN — SODIUM CHLORIDE, PRESERVATIVE FREE 10 ML: 5 INJECTION INTRAVENOUS at 21:36

## 2024-12-07 RX ADMIN — ACETAMINOPHEN 650 MG: 325 TABLET ORAL at 21:35

## 2024-12-07 RX ADMIN — HEPARIN SODIUM 5000 UNITS: 5000 INJECTION INTRAVENOUS; SUBCUTANEOUS at 08:35

## 2024-12-07 RX ADMIN — ASPIRIN 81 MG: 81 TABLET, CHEWABLE ORAL at 08:35

## 2024-12-07 RX ADMIN — SENNOSIDES 17.2 MG: 8.6 TABLET, FILM COATED ORAL at 21:28

## 2024-12-07 RX ADMIN — DIGOXIN 0.06 MG: 125 TABLET ORAL at 17:34

## 2024-12-07 RX ADMIN — METOPROLOL SUCCINATE 25 MG: 25 TABLET, EXTENDED RELEASE ORAL at 21:28

## 2024-12-07 RX ADMIN — METOPROLOL SUCCINATE 25 MG: 25 TABLET, EXTENDED RELEASE ORAL at 08:36

## 2024-12-07 RX ADMIN — HEPARIN SODIUM 5000 UNITS: 5000 INJECTION INTRAVENOUS; SUBCUTANEOUS at 21:28

## 2024-12-07 ASSESSMENT — PAIN DESCRIPTION - LOCATION
LOCATION: BACK

## 2024-12-07 ASSESSMENT — PAIN DESCRIPTION - DESCRIPTORS
DESCRIPTORS: ACHING
DESCRIPTORS: ACHING;DISCOMFORT
DESCRIPTORS: ACHING;STABBING

## 2024-12-07 ASSESSMENT — PAIN DESCRIPTION - ORIENTATION
ORIENTATION: MID
ORIENTATION: LOWER
ORIENTATION: LOWER

## 2024-12-07 ASSESSMENT — PAIN SCALES - GENERAL
PAINLEVEL_OUTOF10: 9
PAINLEVEL_OUTOF10: 3
PAINLEVEL_OUTOF10: 0
PAINLEVEL_OUTOF10: 6

## 2024-12-07 NOTE — CARE COORDINATION
CM met with pt/dtr/other family members to discuss therapy recommendation for [facility] at [facility] and to deliver REIS letter,  Pt/dtr are in agreement with STR at dc.  CM provided them with a list of facilities in network with her insurance to review and provide choices.  They requested that CM contact them tomorrow to obtain the choices so they would have this evening to review the list.  CM will request that weekend CM follow up with pt/dtr.

## 2024-12-07 NOTE — PROGRESS NOTES
Hospitalist Progress Note   Admit Date:  2024 10:40 AM   Name:  Kati Brandon   Age:  84 y.o.  Sex:  female  :  1940   MRN:  [id number]   Room:  [facility]    Presenting/Chief Complaint: Back Pain     Reason(s) for Admission: Compression fracture of C-spine, initial encounter ([facility]) [S12.9XXA]  Compression fracture of L1 vertebra, initial encounter ([facility]) [S32.010A]  Dementia with other behavioral disturbance, unspecified dementia severity, unspecified dementia type ([facility]) [F03.918]       Hospital Course:   Kati Brandon is a 84 y.o. female with medical history of myeloproliferative disorder, polycythemia vera, persistent A-fib, CKD 3, chronic heart failure, Parkinson's who presented from her orthopedic surgeons office due to uncontrolled pain.  Patient recently had a fall several days ago resulting in a compression fracture of L1.  Patient was sent home with Percocet and told to follow-up with orthopedic surgery.  She went to her surgeon's office today and was unable to tolerate the visit due to significant pain.  Given her uncontrolled pain and her worsening appetite, she was directed to come to the ER for further help.     In the ER, patient was given Dilaudid with some pain control.  Patient was unable to give much history due to her pain as well as her waxing/waning memory issues due to her Parkinson's.  Daughter at bedside provided most of the history.  Prior to her fall, patient was ambulatory and functional.  Her memory however has been worsening over the last several months.  She has been following with palliative care.  Patient to be admitted to hospital service for pain control as well as possible rehab placement.    Subjective & 24hr Events:   Patient remains pleasantly confused, sitting up in bed at time of visit.  When asked how she was feeling she replied \"I am not here\" and stated she wanted to get back into bed despite already being in bed.  Mentions her back hurts.    Assessment &  Ref Range    Special Requests RIGHT  ARM        Culture NO GROWTH 2 DAYS     Culture, Blood 1    Collection Time: 12/05/24  2:27 PM    Specimen: Blood   Result Value Ref Range    Special Requests LEFT  HAND        Culture NO GROWTH 2 DAYS     Basic Metabolic Panel w/ Reflex to MG    Collection Time: 12/06/24  4:09 AM   Result Value Ref Range    Sodium 138 136 - 145 mmol/L    Potassium 5.4 (H) 3.5 - 5.1 mmol/L    Chloride 106 98 - 107 mmol/L    CO2 16 (L) 20 - 29 mmol/L    Anion Gap 16 7 - 16 mmol/L    Glucose 73 70 - 99 mg/dL    BUN 39 (H) 8 - 23 MG/DL    Creatinine 2.18 (H) 0.60 - 1.10 MG/DL    Est, Glom Filt Rate 22 (L) >60 ml/min/1.73m2    Calcium 8.4 (L) 8.8 - 10.2 MG/DL   CBC with Auto Differential    Collection Time: 12/06/24  4:09 AM   Result Value Ref Range    WBC 25.6 (H) 4.3 - 11.1 K/uL    RBC 6.13 (H) 4.05 - 5.2 M/uL    Hemoglobin 16.6 (H) 11.7 - 15.4 g/dL    Hematocrit 56.1 (H) 35.8 - 46.3 %    MCV 91.5 82 - 102 FL    MCH 27.1 26.1 - 32.9 PG    MCHC 29.6 (L) 31.4 - 35.0 g/dL    RDW 21.9 (H) 11.9 - 14.6 %    Platelets 603 (H) 150 - 450 K/uL    MPV 10.4 9.4 - 12.3 FL    nRBC 0.00 0.0 - 0.2 K/uL    Differential Type AUTOMATED      Neutrophils % 85 (H) 43 - 78 %    Lymphocytes % 7 (L) 13 - 44 %    Monocytes % 7 4.0 - 12.0 %    Eosinophils % 0 (L) 0.5 - 7.8 %    Basophils % 1 0.0 - 2.0 %    Immature Granulocytes % 1 0.0 - 5.0 %    Neutrophils Absolute 21.7 (H) 1.7 - 8.2 K/UL    Lymphocytes Absolute 1.7 0.5 - 4.6 K/UL    Monocytes Absolute 1.8 (H) 0.1 - 1.3 K/UL    Eosinophils Absolute 0.0 0.0 - 0.8 K/UL    Basophils Absolute 0.2 0.0 - 0.2 K/UL    Immature Granulocytes Absolute 0.2 0.0 - 0.5 K/UL   Basic Metabolic Panel w/ Reflex to MG    Collection Time: 12/07/24  4:15 AM   Result Value Ref Range    Sodium 138 136 - 145 mmol/L    Potassium 4.5 3.5 - 5.1 mmol/L    Chloride 106 98 - 107 mmol/L    CO2 17 (L) 20 - 29 mmol/L    Anion Gap 16 7 - 16 mmol/L    Glucose 108 (H) 70 - 99 mg/dL    BUN 36 (H) 8 - 23 MG/DL

## 2024-12-07 NOTE — ED PROVIDER NOTES
tablet 0.0625 mg (0.0625 mg Oral Given 12/5/24 1835)   0.9 % sodium chloride infusion ( IntraVENous New Bag 12/6/24 0909)   sodium bicarbonate tablet 650 mg (650 mg Oral Given 12/6/24 0903)   senna (SENOKOT) tablet 17.2 mg (0 mg Oral Held 12/6/24 1146)   bisacodyl (DULCOLAX) EC tablet 10 mg (0 mg Oral Held 12/6/24 1146)   lidocaine 4 % external patch 1 patch (1 patch TransDERmal Patch Applied 12/6/24 1147)   HYDROmorphone HCl PF (DILAUDID) injection 0.5 mg (0.5 mg IntraVENous Given 12/5/24 1140)   ondansetron (ZOFRAN) injection 4 mg (4 mg IntraVENous Given 12/5/24 1140)   cefTRIAXone (ROCEPHIN) 1,000 mg in sterile water 10 mL IV syringe (1,000 mg IntraVENous Given 12/5/24 1414)   azithromycin (ZITHROMAX) 500 mg in sodium chloride 0.9 % 250 mL IVPB ([id number]) ( IntraVENous Stopped 12/5/24 1521)   sodium zirconium cyclosilicate (LOKELMA) oral suspension 10 g (10 g Oral Given 12/6/24 0903)       Current Discharge Medication List           Past Medical History:   Diagnosis Date    Acute systolic CHF (congestive heart failure), NYHA class 4 (HCC) 01/06/2023    Hypertension         Past Surgical History:   Procedure Laterality Date    APPENDECTOMY      THORACENTESIS Bilateral 12/30/2022    THORACENTESIS ULTRASOUND performed by Stuart Muñoz MD at [facility] ENDOSCOPY    THORACENTESIS Bilateral 1/6/2023    THORACENTESIS ULTRASOUND performed by Ele Helms MD at [facility] ENDOSCOPY        Social History     Socioeconomic History    Marital status:    Tobacco Use    Smoking status: Never     Passive exposure: Never    Smokeless tobacco: Never   Vaping Use    Vaping status: Never Used   Substance and Sexual Activity    Alcohol use: Yes     Comment: occ    Drug use: Never    Sexual activity: Never     Social Determinants of Health     Financial Resource Strain: Low Risk  (11/24/2024)    Overall Financial Resource Strain (CARDIA)     Difficulty of Paying Living Expenses: Not very hard   Food Insecurity: No Food Insecurity  3/28/24 12/3/24  Rome Silva MD       No Known Allergies     Comprehensive review of systems completed and negative with   exception of noted above     Objective:     Visit Vitals  /87   Pulse 91   Temp 98.1 °F (36.7 °C) (Oral)   Resp 16   Ht 1.575 m (5' 2\")   Wt 49.9 kg (110 lb)   SpO2 94%   BMI 20.12 kg/m²        Physical Exam:    General:  Cooperative. frail   Eyes:  Conjunctivae/corneas clear    Nose: Nares normal. Septum midline.   Neck: Supple, symmetrical, trachea midline, no JVD   Lungs:   Clear to auscultation bilaterally, unlabored   Heart:  Regular rate and rhythm, no murmur    Abdomen:   Soft, non-tender, non-distended. Positive bowel sounds     Extremities: Normal, atraumatic, no cyanosis or edema   Skin: Skin color, texture, turgor normal. No rash or lesions.   Neurologic: Nonfocal   Psych: Alert but confused       Signed By: Chauncey Sandhu MD     December 6, 2024               XR CHEST PORTABLE   Final Result   Suspected bronchial plugging of a right lower lobe segmental   bronchus and the medial segmental bronchus of the middle lobe.          Electronically signed by Armani Martinez      XR LUMBAR SPINE (2-3 VIEWS)   Final Result      1. Worsening height loss at the site of the L1 compression deformity   demonstrated on the 12/3/2024 exam, now measuring 75%. There is 0.5 cm   retropulsion of the posterior margin of the vertebral body, likely narrowing the   spinal canal to some extent. A lumbar spine MRI can be obtained for complete   characterization as deemed useful.      2. Degenerative findings are otherwise as detailed above.               Electronically signed by SABI HAILE                   No results for input(s): \"COVID19\" in the last 72 hours.    Voice dictation software was used during the making of this note.  This software is not perfect and grammatical and other typographical errors may be present.  This note has not been completely proofread for errors.  e

## 2024-12-07 NOTE — CARE COORDINATION
LMSW met with pt and family at bedside.  Provided choices for SNF rehab.  Referrals sent to [facility] and [facility] as requested with preference in that order.  Await bed offers.  Pt insurance will require a precert for any rehab placement.

## 2024-12-07 NOTE — PLAN OF CARE
Problem: Chronic Conditions and Co-morbidities  Goal: Patient's chronic conditions and co-morbidity symptoms are monitored and maintained or improved  12/7/2024 0848 by Odalis Patterson RN  Outcome: Progressing  12/7/2024 0221 by Ann Perdomo RN  Outcome: Progressing     Problem: Discharge Planning  Goal: Discharge to home or other facility with appropriate resources  12/7/2024 0848 by Odalis Patterson RN  Outcome: Progressing  12/7/2024 0221 by Ann Perdomo RN  Outcome: Progressing     Problem: Pain  Goal: Verbalizes/displays adequate comfort level or baseline comfort level  12/7/2024 0848 by Odalis Patterson RN  Outcome: Progressing  12/7/2024 0221 by Ann Perdomo RN  Outcome: Progressing     Problem: Safety - Adult  Goal: Free from fall injury  12/7/2024 0848 by Odalis Patterson RN  Outcome: Progressing  Flowsheets (Taken 12/7/2024 0733)  Free From Fall Injury: Instruct family/caregiver on patient safety  12/7/2024 0221 by Ann Perdomo RN  Outcome: Progressing     Problem: ABCDS Injury Assessment  Goal: Absence of physical injury  12/7/2024 0848 by Odalis Patterson RN  Outcome: Progressing  Flowsheets (Taken 12/7/2024 0733)  Absence of Physical Injury: Implement safety measures based on patient assessment  12/7/2024 0221 by Ann Perdomo RN  Outcome: Progressing     Problem: Skin/Tissue Integrity  Goal: Absence of new skin breakdown  Description: 1.  Monitor for areas of redness and/or skin breakdown  2.  Assess vascular access sites hourly  3.  Every 4-6 hours minimum:  Change oxygen saturation probe site  4.  Every 4-6 hours:  If on nasal continuous positive airway pressure, respiratory therapy assess nares and determine need for appliance change or resting period.  12/7/2024 0848 by Odalis Patterson RN  Outcome: Progressing  12/7/2024 0221 by Ann Perdomo RN  Outcome: Progressing     Problem: Confusion  Goal: Confusion,  delirium, dementia, or psychosis is improved or at baseline  Description: INTERVENTIONS:  1. Assess for possible contributors to thought disturbance, including medications, impaired vision or hearing, underlying metabolic abnormalities, dehydration, psychiatric diagnoses, and notify attending LIP  2. [place] high risk fall precautions, as indicated  3. Provide frequent short contacts to provide reality reorientation, refocusing and direction  4. Decrease environmental stimuli, including noise as appropriate  5. Monitor and intervene to maintain adequate nutrition, hydration, elimination, sleep and activity  6. If unable to ensure safety without constant attention obtain sitter and review sitter guidelines with assigned personnel  7. Initiate Psychosocial CNS and Spiritual Care consult, as indicated  12/7/2024 0848 by Odalis Patterson, RN  Outcome: Progressing  12/7/2024 0221 by Ann Perdomo, RN  Outcome: Progressing

## 2024-12-07 NOTE — PLAN OF CARE
Problem: Chronic Conditions and Co-morbidities  Goal: Patient's chronic conditions and co-morbidity symptoms are monitored and maintained or improved  Outcome: Progressing     Problem: Discharge Planning  Goal: Discharge to home or other facility with appropriate resources  Outcome: Progressing     Problem: Pain  Goal: Verbalizes/displays adequate comfort level or baseline comfort level  Outcome: Progressing     Problem: Safety - Adult  Goal: Free from fall injury  Outcome: Progressing     Problem: ABCDS Injury Assessment  Goal: Absence of physical injury  Outcome: Progressing     Problem: Skin/Tissue Integrity  Goal: Absence of new skin breakdown  Description: 1.  Monitor for areas of redness and/or skin breakdown  2.  Assess vascular access sites hourly  3.  Every 4-6 hours minimum:  Change oxygen saturation probe site  4.  Every 4-6 hours:  If on nasal continuous positive airway pressure, respiratory therapy assess nares and determine need for appliance change or resting period.  Outcome: Progressing     Problem: Confusion  Goal: Confusion, delirium, dementia, or psychosis is improved or at baseline  Description: INTERVENTIONS:  1. Assess for possible contributors to thought disturbance, including medications, impaired vision or hearing, underlying metabolic abnormalities, dehydration, psychiatric diagnoses, and notify attending LIP  2. [place] high risk fall precautions, as indicated  3. Provide frequent short contacts to provide reality reorientation, refocusing and direction  4. Decrease environmental stimuli, including noise as appropriate  5. Monitor and intervene to maintain adequate nutrition, hydration, elimination, sleep and activity  6. If unable to ensure safety without constant attention obtain sitter and review sitter guidelines with assigned personnel  7. Initiate Psychosocial CNS and Spiritual Care consult, as indicated  Outcome: Progressing

## 2024-12-08 PROBLEM — S32.010A CLOSED COMPRESSION FRACTURE OF BODY OF FIRST LUMBAR VERTEBRA (HCC): Status: ACTIVE | Noted: 2024-12-05

## 2024-12-08 PROBLEM — S32.010A CLOSED COMPRESSION FRACTURE OF BODY OF FIRST LUMBAR VERTEBRA (HCC): Status: ACTIVE | Noted: 2024-12-08

## 2024-12-08 LAB
ANION GAP SERPL CALC-SCNC: 11 MMOL/L (ref 7–16)
BASOPHILS # BLD: 0.2 K/UL (ref 0–0.2)
BASOPHILS NFR BLD: 1 % (ref 0–2)
BUN SERPL-MCNC: 33 MG/DL (ref 8–23)
CALCIUM SERPL-MCNC: 8.4 MG/DL (ref 8.8–10.2)
CHLORIDE SERPL-SCNC: 105 MMOL/L (ref 98–107)
CO2 SERPL-SCNC: 20 MMOL/L (ref 20–29)
CREAT SERPL-MCNC: 1.82 MG/DL (ref 0.6–1.1)
DIFFERENTIAL METHOD BLD: ABNORMAL
EOSINOPHIL # BLD: 0.1 K/UL (ref 0–0.8)
EOSINOPHIL NFR BLD: 1 % (ref 0.5–7.8)
ERYTHROCYTE [DISTWIDTH] IN BLOOD BY AUTOMATED COUNT: 22.6 % (ref 11.9–14.6)
GLUCOSE SERPL-MCNC: 90 MG/DL (ref 70–99)
HCT VFR BLD AUTO: 54.4 % (ref 35.8–46.3)
HGB BLD-MCNC: 16.4 G/DL (ref 11.7–15.4)
IMM GRANULOCYTES # BLD AUTO: 0.7 K/UL (ref 0–0.5)
IMM GRANULOCYTES NFR BLD AUTO: 2 % (ref 0–5)
LYMPHOCYTES # BLD: 1.9 K/UL (ref 0.5–4.6)
LYMPHOCYTES NFR BLD: 7 % (ref 13–44)
MCH RBC QN AUTO: 27.6 PG (ref 26.1–32.9)
MCHC RBC AUTO-ENTMCNC: 30.1 G/DL (ref 31.4–35)
MCV RBC AUTO: 91.6 FL (ref 82–102)
MONOCYTES # BLD: 3.2 K/UL (ref 0.1–1.3)
MONOCYTES NFR BLD: 11 % (ref 4–12)
NEUTS SEG # BLD: 22.5 K/UL (ref 1.7–8.2)
NEUTS SEG NFR BLD: 79 % (ref 43–78)
NRBC # BLD: 0 K/UL (ref 0–0.2)
PLATELET # BLD AUTO: 591 K/UL (ref 150–450)
PMV BLD AUTO: 11.2 FL (ref 9.4–12.3)
POTASSIUM SERPL-SCNC: 4.9 MMOL/L (ref 3.5–5.1)
PROCALCITONIN SERPL-MCNC: 1.62 NG/ML (ref 0–0.1)
RBC # BLD AUTO: 5.94 M/UL (ref 4.05–5.2)
SODIUM SERPL-SCNC: 136 MMOL/L (ref 136–145)
WBC # BLD AUTO: 28.6 K/UL (ref 4.3–11.1)

## 2024-12-08 PROCEDURE — 85025 COMPLETE CBC W/AUTO DIFF WBC: CPT

## 2024-12-08 PROCEDURE — 6370000000 HC RX 637 (ALT 250 FOR IP): Performed by: INTERNAL MEDICINE

## 2024-12-08 PROCEDURE — 2580000003 HC RX 258: Performed by: INTERNAL MEDICINE

## 2024-12-08 PROCEDURE — 2580000003 HC RX 258: Performed by: FAMILY MEDICINE

## 2024-12-08 PROCEDURE — 96372 THER/PROPH/DIAG INJ SC/IM: CPT

## 2024-12-08 PROCEDURE — 6360000002 HC RX W HCPCS: Performed by: INTERNAL MEDICINE

## 2024-12-08 PROCEDURE — 36415 COLL VENOUS BLD VENIPUNCTURE: CPT

## 2024-12-08 PROCEDURE — 6370000000 HC RX 637 (ALT 250 FOR IP): Performed by: FAMILY MEDICINE

## 2024-12-08 PROCEDURE — 96361 HYDRATE IV INFUSION ADD-ON: CPT

## 2024-12-08 PROCEDURE — 1100000000 HC RM PRIVATE

## 2024-12-08 PROCEDURE — 80048 BASIC METABOLIC PNL TOTAL CA: CPT

## 2024-12-08 PROCEDURE — 84145 PROCALCITONIN (PCT): CPT

## 2024-12-08 PROCEDURE — 51798 US URINE CAPACITY MEASURE: CPT

## 2024-12-08 PROCEDURE — G0378 HOSPITAL OBSERVATION PER HR: HCPCS

## 2024-12-08 RX ORDER — 0.9 % SODIUM CHLORIDE 0.9 %
500 INTRAVENOUS SOLUTION INTRAVENOUS ONCE
Status: COMPLETED | OUTPATIENT
Start: 2024-12-08 | End: 2024-12-08

## 2024-12-08 RX ADMIN — HEPARIN SODIUM 5000 UNITS: 5000 INJECTION INTRAVENOUS; SUBCUTANEOUS at 21:33

## 2024-12-08 RX ADMIN — METOPROLOL SUCCINATE 25 MG: 25 TABLET, EXTENDED RELEASE ORAL at 21:32

## 2024-12-08 RX ADMIN — SENNOSIDES 17.2 MG: 8.6 TABLET, FILM COATED ORAL at 21:32

## 2024-12-08 RX ADMIN — SODIUM CHLORIDE 500 ML: 9 INJECTION, SOLUTION INTRAVENOUS at 08:13

## 2024-12-08 RX ADMIN — ASPIRIN 81 MG: 81 TABLET, CHEWABLE ORAL at 08:16

## 2024-12-08 RX ADMIN — HEPARIN SODIUM 5000 UNITS: 5000 INJECTION INTRAVENOUS; SUBCUTANEOUS at 08:16

## 2024-12-08 RX ADMIN — OXYCODONE 5 MG: 5 TABLET ORAL at 17:38

## 2024-12-08 RX ADMIN — OXYCODONE 5 MG: 5 TABLET ORAL at 22:24

## 2024-12-08 RX ADMIN — SODIUM BICARBONATE 650 MG TABLET 650 MG: at 08:16

## 2024-12-08 RX ADMIN — SODIUM CHLORIDE, PRESERVATIVE FREE 10 ML: 5 INJECTION INTRAVENOUS at 08:17

## 2024-12-08 RX ADMIN — SODIUM BICARBONATE 650 MG TABLET 650 MG: at 21:32

## 2024-12-08 RX ADMIN — ACETAMINOPHEN 650 MG: 325 TABLET ORAL at 05:27

## 2024-12-08 RX ADMIN — SODIUM CHLORIDE, PRESERVATIVE FREE 10 ML: 5 INJECTION INTRAVENOUS at 23:33

## 2024-12-08 RX ADMIN — OXYCODONE 5 MG: 5 TABLET ORAL at 11:13

## 2024-12-08 RX ADMIN — SENNOSIDES 17.2 MG: 8.6 TABLET, FILM COATED ORAL at 08:17

## 2024-12-08 RX ADMIN — EMPAGLIFLOZIN 10 MG: 10 TABLET, FILM COATED ORAL at 08:16

## 2024-12-08 ASSESSMENT — PAIN DESCRIPTION - FREQUENCY: FREQUENCY: INTERMITTENT

## 2024-12-08 ASSESSMENT — PAIN DESCRIPTION - DESCRIPTORS
DESCRIPTORS: ACHING

## 2024-12-08 ASSESSMENT — PAIN DESCRIPTION - PAIN TYPE: TYPE: ACUTE PAIN

## 2024-12-08 ASSESSMENT — PAIN SCALES - GENERAL
PAINLEVEL_OUTOF10: 6
PAINLEVEL_OUTOF10: 0
PAINLEVEL_OUTOF10: 0
PAINLEVEL_OUTOF10: 4
PAINLEVEL_OUTOF10: 0
PAINLEVEL_OUTOF10: 6
PAINLEVEL_OUTOF10: 6
PAINLEVEL_OUTOF10: 3
PAINLEVEL_OUTOF10: 0

## 2024-12-08 ASSESSMENT — PAIN DESCRIPTION - ONSET: ONSET: GRADUAL

## 2024-12-08 ASSESSMENT — PAIN DESCRIPTION - ORIENTATION
ORIENTATION: LOWER
ORIENTATION: MID
ORIENTATION: MID

## 2024-12-08 ASSESSMENT — PAIN - FUNCTIONAL ASSESSMENT: PAIN_FUNCTIONAL_ASSESSMENT: ACTIVITIES ARE NOT PREVENTED

## 2024-12-08 ASSESSMENT — PAIN DESCRIPTION - LOCATION
LOCATION: BACK

## 2024-12-08 NOTE — PROGRESS NOTES
Hospitalist Progress Note   Admit Date:  2024 10:40 AM   Name:  Kati Brandon   Age:  84 y.o.  Sex:  female  :  1940   MRN:  [id number]   Room:  [facility]    Presenting/Chief Complaint: Back Pain     Reason(s) for Admission: Compression fracture of C-spine, initial encounter ([facility]) [S12.9XXA]  Compression fracture of L1 vertebra, initial encounter ([facility]) [S32.010A]  Dementia with other behavioral disturbance, unspecified dementia severity, unspecified dementia type ([facility]) [F03.918]       Hospital Course:   Kati Brandon is a 84 y.o. female with medical history of myeloproliferative disorder, polycythemia vera, persistent A-fib, CKD 3, chronic heart failure, Parkinson's who presented from her orthopedic surgeons office due to uncontrolled pain.  Patient recently had a fall several days ago resulting in a compression fracture of L1.  Patient was sent home with Percocet and told to follow-up with orthopedic surgery.  She went to her surgeon's office today and was unable to tolerate the visit due to significant pain.  Given her uncontrolled pain and her worsening appetite, she was directed to come to the ER for further help.     In the ER, patient was given Dilaudid with some pain control.  Patient was unable to give much history due to her pain as well as her waxing/waning memory issues due to her Parkinson's.  Daughter at bedside provided most of the history.  Prior to her fall, patient was ambulatory and functional.  Her memory however has been worsening over the last several months.  She has been following with palliative care.  Patient to be admitted to hospital service for pain control as well as possible rehab placement.    Subjective & 24hr Events:   Patient sitting upright in bed at time of visit, states she needs to void (staff called).  Daughter at bedside.    Assessment & Plan:     Compression fracture of L1  Nonoperative management per orthopedic progress note on 2024.  Required

## 2024-12-08 NOTE — PLAN OF CARE
Problem: Chronic Conditions and Co-morbidities  Goal: Patient's chronic conditions and co-morbidity symptoms are monitored and maintained or improved  Outcome: Progressing     Problem: Discharge Planning  Goal: Discharge to home or other facility with appropriate resources  Outcome: Progressing     Problem: Pain  Goal: Verbalizes/displays adequate comfort level or baseline comfort level  Outcome: Progressing     Problem: Safety - Adult  Goal: Free from fall injury  Outcome: Progressing     Problem: ABCDS Injury Assessment  Goal: Absence of physical injury  Outcome: Progressing     Problem: Skin/Tissue Integrity  Goal: Absence of new skin breakdown  Description: 1.  Monitor for areas of redness and/or skin breakdown  2.  Assess vascular access sites hourly  3.  Every 4-6 hours minimum:  Change oxygen saturation probe site  4.  Every 4-6 hours:  If on nasal continuous positive airway pressure, respiratory therapy assess nares and determine need for appliance change or resting period.  Outcome: Progressing     Problem: Confusion  Goal: Confusion, delirium, dementia, or psychosis is improved or at baseline  Description: INTERVENTIONS:  1. Assess for possible contributors to thought disturbance, including medications, impaired vision or hearing, underlying metabolic abnormalities, dehydration, psychiatric diagnoses, and notify attending LIP  2. [place] high risk fall precautions, as indicated  3. Provide frequent short contacts to provide reality reorientation, refocusing and direction  4. Decrease environmental stimuli, including noise as appropriate  5. Monitor and intervene to maintain adequate nutrition, hydration, elimination, sleep and activity  6. If unable to ensure safety without constant attention obtain sitter and review sitter guidelines with assigned personnel  7. Initiate Psychosocial CNS and Spiritual Care consult, as indicated  Outcome: Progressing

## 2024-12-08 NOTE — PLAN OF CARE
Problem: Chronic Conditions and Co-morbidities  Goal: Patient's chronic conditions and co-morbidity symptoms are monitored and maintained or improved  12/8/2024 0832 by Odalis Patterson RN  Outcome: Progressing  Flowsheets (Taken 12/8/2024 0734)  Care Plan - Patient's Chronic Conditions and Co-Morbidity Symptoms are Monitored and Maintained or Improved: Monitor and assess patient's chronic conditions and comorbid symptoms for stability, deterioration, or improvement  12/8/2024 0315 by Ann Perdomo RN  Outcome: Progressing     Problem: Discharge Planning  Goal: Discharge to home or other facility with appropriate resources  12/8/2024 0832 by Odalis Patterson RN  Outcome: Progressing  12/8/2024 0315 by Ann Perdomo RN  Outcome: Progressing     Problem: Pain  Goal: Verbalizes/displays adequate comfort level or baseline comfort level  12/8/2024 0832 by Odalis Patterson RN  Outcome: Progressing  12/8/2024 0315 by Ann Perdomo RN  Outcome: Progressing     Problem: Safety - Adult  Goal: Free from fall injury  12/8/2024 0832 by Odalis Patterson RN  Outcome: Progressing  Flowsheets (Taken 12/8/2024 0734)  Free From Fall Injury: Instruct family/caregiver on patient safety  12/8/2024 0315 by Ann Perdomo RN  Outcome: Progressing     Problem: ABCDS Injury Assessment  Goal: Absence of physical injury  12/8/2024 0832 by Odalis Patterson RN  Outcome: Progressing  Flowsheets (Taken 12/8/2024 0734)  Absence of Physical Injury: Implement safety measures based on patient assessment  12/8/2024 0315 by Ann Perdomo RN  Outcome: Progressing     Problem: Skin/Tissue Integrity  Goal: Absence of new skin breakdown  Description: 1.  Monitor for areas of redness and/or skin breakdown  2.  Assess vascular access sites hourly  3.  Every 4-6 hours minimum:  Change oxygen saturation probe site  4.  Every 4-6 hours:  If on nasal continuous positive airway pressure, respiratory

## 2024-12-09 LAB
ANION GAP SERPL CALC-SCNC: 13 MMOL/L (ref 7–16)
BASOPHILS # BLD: 0.3 K/UL (ref 0–0.2)
BASOPHILS NFR BLD: 1 % (ref 0–2)
BUN SERPL-MCNC: 31 MG/DL (ref 8–23)
CALCIUM SERPL-MCNC: 8.4 MG/DL (ref 8.8–10.2)
CHLORIDE SERPL-SCNC: 102 MMOL/L (ref 98–107)
CO2 SERPL-SCNC: 19 MMOL/L (ref 20–29)
CREAT SERPL-MCNC: 1.77 MG/DL (ref 0.6–1.1)
DIFFERENTIAL METHOD BLD: ABNORMAL
EOSINOPHIL # BLD: 0.2 K/UL (ref 0–0.8)
EOSINOPHIL NFR BLD: 1 % (ref 0.5–7.8)
ERYTHROCYTE [DISTWIDTH] IN BLOOD BY AUTOMATED COUNT: 22.7 % (ref 11.9–14.6)
GLUCOSE SERPL-MCNC: 91 MG/DL (ref 70–99)
HCT VFR BLD AUTO: 52 % (ref 35.8–46.3)
HGB BLD-MCNC: 15.9 G/DL (ref 11.7–15.4)
IMM GRANULOCYTES # BLD AUTO: 0.6 K/UL (ref 0–0.5)
IMM GRANULOCYTES NFR BLD AUTO: 2 % (ref 0–5)
LYMPHOCYTES # BLD: 1.9 K/UL (ref 0.5–4.6)
LYMPHOCYTES NFR BLD: 7 % (ref 13–44)
MCH RBC QN AUTO: 27.6 PG (ref 26.1–32.9)
MCHC RBC AUTO-ENTMCNC: 30.6 G/DL (ref 31.4–35)
MCV RBC AUTO: 90.3 FL (ref 82–102)
MONOCYTES # BLD: 2.3 K/UL (ref 0.1–1.3)
MONOCYTES NFR BLD: 8 % (ref 4–12)
NEUTS SEG # BLD: 23.3 K/UL (ref 1.7–8.2)
NEUTS SEG NFR BLD: 82 % (ref 43–78)
NRBC # BLD: 0 K/UL (ref 0–0.2)
PLATELET # BLD AUTO: 546 K/UL (ref 150–450)
PMV BLD AUTO: 11.2 FL (ref 9.4–12.3)
POTASSIUM SERPL-SCNC: 5.3 MMOL/L (ref 3.5–5.1)
POTASSIUM SERPL-SCNC: 6 MMOL/L (ref 3.5–5.1)
PROCALCITONIN SERPL-MCNC: 1.23 NG/ML (ref 0–0.1)
RBC # BLD AUTO: 5.76 M/UL (ref 4.05–5.2)
SARS-COV-2 RDRP RESP QL NAA+PROBE: NOT DETECTED
SODIUM SERPL-SCNC: 134 MMOL/L (ref 136–145)
SOURCE: NORMAL
WBC # BLD AUTO: 28.6 K/UL (ref 4.3–11.1)

## 2024-12-09 PROCEDURE — 36415 COLL VENOUS BLD VENIPUNCTURE: CPT

## 2024-12-09 PROCEDURE — 84132 ASSAY OF SERUM POTASSIUM: CPT

## 2024-12-09 PROCEDURE — 80048 BASIC METABOLIC PNL TOTAL CA: CPT

## 2024-12-09 PROCEDURE — 6360000002 HC RX W HCPCS: Performed by: INTERNAL MEDICINE

## 2024-12-09 PROCEDURE — 85025 COMPLETE CBC W/AUTO DIFF WBC: CPT

## 2024-12-09 PROCEDURE — 97112 NEUROMUSCULAR REEDUCATION: CPT

## 2024-12-09 PROCEDURE — 84145 PROCALCITONIN (PCT): CPT

## 2024-12-09 PROCEDURE — 6370000000 HC RX 637 (ALT 250 FOR IP): Performed by: FAMILY MEDICINE

## 2024-12-09 PROCEDURE — 87635 SARS-COV-2 COVID-19 AMP PRB: CPT

## 2024-12-09 PROCEDURE — 6370000000 HC RX 637 (ALT 250 FOR IP): Performed by: INTERNAL MEDICINE

## 2024-12-09 PROCEDURE — 2580000003 HC RX 258: Performed by: INTERNAL MEDICINE

## 2024-12-09 PROCEDURE — 1100000000 HC RM PRIVATE

## 2024-12-09 PROCEDURE — 97535 SELF CARE MNGMENT TRAINING: CPT

## 2024-12-09 PROCEDURE — 97530 THERAPEUTIC ACTIVITIES: CPT

## 2024-12-09 RX ADMIN — HEPARIN SODIUM 5000 UNITS: 5000 INJECTION INTRAVENOUS; SUBCUTANEOUS at 08:18

## 2024-12-09 RX ADMIN — EMPAGLIFLOZIN 10 MG: 10 TABLET, FILM COATED ORAL at 08:18

## 2024-12-09 RX ADMIN — SODIUM ZIRCONIUM CYCLOSILICATE 10 G: 10 POWDER, FOR SUSPENSION ORAL at 12:57

## 2024-12-09 RX ADMIN — DIGOXIN 0.06 MG: 125 TABLET ORAL at 18:06

## 2024-12-09 RX ADMIN — SODIUM CHLORIDE, PRESERVATIVE FREE 10 ML: 5 INJECTION INTRAVENOUS at 21:05

## 2024-12-09 RX ADMIN — SODIUM CHLORIDE, PRESERVATIVE FREE 10 ML: 5 INJECTION INTRAVENOUS at 08:19

## 2024-12-09 RX ADMIN — ASPIRIN 81 MG: 81 TABLET, CHEWABLE ORAL at 08:18

## 2024-12-09 RX ADMIN — SODIUM BICARBONATE 650 MG TABLET 650 MG: at 08:18

## 2024-12-09 RX ADMIN — METOPROLOL SUCCINATE 25 MG: 25 TABLET, EXTENDED RELEASE ORAL at 08:18

## 2024-12-09 RX ADMIN — SODIUM BICARBONATE 650 MG TABLET 650 MG: at 21:03

## 2024-12-09 RX ADMIN — OXYCODONE 5 MG: 5 TABLET ORAL at 08:18

## 2024-12-09 RX ADMIN — OXYCODONE 5 MG: 5 TABLET ORAL at 17:17

## 2024-12-09 RX ADMIN — SENNOSIDES 17.2 MG: 8.6 TABLET, FILM COATED ORAL at 21:03

## 2024-12-09 RX ADMIN — HEPARIN SODIUM 5000 UNITS: 5000 INJECTION INTRAVENOUS; SUBCUTANEOUS at 21:04

## 2024-12-09 RX ADMIN — SENNOSIDES 17.2 MG: 8.6 TABLET, FILM COATED ORAL at 08:18

## 2024-12-09 ASSESSMENT — PAIN SCALES - GENERAL
PAINLEVEL_OUTOF10: 0
PAINLEVEL_OUTOF10: 6
PAINLEVEL_OUTOF10: 6
PAINLEVEL_OUTOF10: 0
PAINLEVEL_OUTOF10: 6

## 2024-12-09 ASSESSMENT — PAIN DESCRIPTION - LOCATION
LOCATION: BACK
LOCATION: BACK

## 2024-12-09 ASSESSMENT — PAIN DESCRIPTION - DESCRIPTORS
DESCRIPTORS: ACHING
DESCRIPTORS: ACHING

## 2024-12-09 ASSESSMENT — PAIN DESCRIPTION - ORIENTATION
ORIENTATION: LOWER
ORIENTATION: LOWER

## 2024-12-09 NOTE — PLAN OF CARE
Problem: Chronic Conditions and Co-morbidities  Goal: Patient's chronic conditions and co-morbidity symptoms are monitored and maintained or improved  Outcome: Progressing     Problem: Discharge Planning  Goal: Discharge to home or other facility with appropriate resources  Outcome: Progressing     Problem: Pain  Goal: Verbalizes/displays adequate comfort level or baseline comfort level  Outcome: Progressing     Problem: Safety - Adult  Goal: Free from fall injury  Outcome: Progressing     Problem: ABCDS Injury Assessment  Goal: Absence of physical injury  Outcome: Progressing     Problem: Skin/Tissue Integrity  Goal: Absence of new skin breakdown  Description: 1.  Monitor for areas of redness and/or skin breakdown  2.  Assess vascular access sites hourly  3.  Every 4-6 hours minimum:  Change oxygen saturation probe site  4.  Every 4-6 hours:  If on nasal continuous positive airway pressure, respiratory therapy assess nares and determine need for appliance change or resting period.  Outcome: Progressing     Problem: Confusion  Goal: Confusion, delirium, dementia, or psychosis is improved or at baseline  Description: INTERVENTIONS:  1. Assess for possible contributors to thought disturbance, including medications, impaired vision or hearing, underlying metabolic abnormalities, dehydration, psychiatric diagnoses, and notify attending LIP  2. [place] high risk fall precautions, as indicated  3. Provide frequent short contacts to provide reality reorientation, refocusing and direction  4. Decrease environmental stimuli, including noise as appropriate  5. Monitor and intervene to maintain adequate nutrition, hydration, elimination, sleep and activity  6. If unable to ensure safety without constant attention obtain sitter and review sitter guidelines with assigned personnel  7. Initiate Psychosocial CNS and Spiritual Care consult, as indicated  Outcome: Progressing

## 2024-12-09 NOTE — CARE COORDINATION
Pt accepted for STR admission to [facility]. Insurance auth request submitted via the [employer] online portal.  Pt initially approved for 3 days with a SOC for 12/10/2024 and the next continued stay review due 12/12/2024.  [facility] reference #[id number]. Referral for [facility] services submitted via the online [facility] portal per the pt/family request.  Confirmation #[id number].  CM notified pt's dtr of bed offer, insurance approval and plan for dc tomorrow if pt is medically ready.  She is in agreement with this plan. Rapid COVID ordered. CM following to facilitate pt's transfer to rehab at [facility].

## 2024-12-09 NOTE — PROGRESS NOTES
ACUTE OCCUPATIONAL THERAPY GOALS:   (Developed with and agreed upon by patient and/or caregiver.)  1. Patient will complete lower body bathing and dressing with CONTACT GUARD ASSIST and adaptive equipment as needed.   2. Patient will complete functional transfers with STANDBY ASSIST and adaptive equipment as needed.   3. Patient will complete toileting and toilet transfer with STANDBY ASSIST.   4. Patient will complete functional mobility of household distances with STANDBY ASSIST and adaptive equipment as needed.   5. Patient will demonstrate ability to log roll in bed with STANDBY ASSIST and no verbal cues from therapist.      Timeframe: 7 visits      OCCUPATIONAL THERAPY: Daily Note AM   OT Visit Days: 2   Time In/Out  OT Charge Capture  Rehab Caseload Tracker  OT Orders    Kati Brandon is a 84 y.o. female   PRIMARY DIAGNOSIS: Closed compression fracture of body of first lumbar vertebra (HCC)  Compression fracture of C-spine, initial encounter ([facility]) [S12.9XXA]  Compression fracture of L1 vertebra, initial encounter ([facility]) [S32.010A]  Dementia with other behavioral disturbance, unspecified dementia severity, unspecified dementia type ([facility]) [F03.918]  Closed compression fracture of body of first lumbar vertebra ([facility]) [S32.010A]       Inpatient: Payor: [facility] MEDICARE / Plan: [facility] MEDICARE ADVANTAGE / Product Type: *No Product type* /     ASSESSMENT:     REHAB RECOMMENDATIONS:   Recommendation to date pending progress:  Setting:  Short-term Rehab    Equipment:    To Be Determined     ASSESSMENT:  Ms. Brandon is received supine in bed, agreeable to participate in the session. She continues to demonstrate decreased insight into deficits and repeatedly requests to return to bed but agreeable to sit up with encouragement. Pt performed bed mobility transfers/supine to sit edge of bed via log roll with maxA x2 and additional time/cues for proper technique and sequencing. Pt presented with fair(+)/fair sitting  not reated Vitals          Oxygen        MOBILITY: I Mod I S SBA CGA Min Mod Max Total  NT x2 Comments:   Bed Mobility    Rolling [] [] [] [] [] [] [] [x] [] [] [x]    Supine to Sit [] [] [] [] [] [] [] [x] [] [] [x] Log roll   Scooting [] [] [] [] [] [] [] [x] [] [] [x]    Sit to Supine [] [] [] [] [] [] [] [] [] [] []    Transfers    Sit to Stand [] [] [] [] [] [x] [] [] [] [] [x]    Bed to Chair [] [] [] [] [] [x] [] [] [] [] [x] Rolling walker    Stand to Sit [] [] [] [] [] [x] [] [] [] [] [x]    Tub/Shower [] [] [] [] [] [] [] [] [] [x] []     Toilet [] [] [] [] [] [] [] [] [] [x] []      [] [] [] [] [] [] [] [] [] [] []    I=Independent, Mod I=Modified Independent, S=Supervision/Setup, SBA=Standby Assistance, CGA=Contact Guard Assistance, Min=Minimal Assistance, Mod=Moderate Assistance, Max=Maximal Assistance, Total=Total Assistance, NT=Not Tested    ACTIVITIES OF DAILY LIVING: I Mod I S SBA CGA Min Mod Max Total NT Comments   BASIC ADLs:              Upper Body   Bathing [] [] [] [] [] [] [] [] [] [x]     Lower Body Bathing [] [] [] [] [] [] [] [] [] [x]     Toileting [] [] [] [] [] [] [] [] [] [x]    Upper Body Dressing [] [] [] [] [] [] [] [x] [] [] Don TLSO brace prior to OOB mobility   Lower Body Dressing [] [] [] [] [] [] [] [x] [] [] Don socks   Feeding [] [] [] [] [] [] [] [] [] [x]    Grooming [] [] [] [] [] [x] [] [] [] [] Seated edge of sink, completed oral hygiene, face washing, hair washing via shower cap, and hair combing   Increased multimodal cues required for task initiation, sequencing, and coordination   Personal Device Care [] [] [] [] [] [] [] [] [] [x]    Functional Mobility [] [] [] [] [] [x] [] [] [] [] X2, rolling walker, chair follow, short distances   I=Independent, Mod I=Modified Independent, S=Supervision/Setup, SBA=Standby Assistance, CGA=Contact Guard Assistance, Min=Minimal Assistance, Mod=Moderate Assistance, Max=Maximal Assistance, Total=Total Assistance, NT=Not

## 2024-12-09 NOTE — PROGRESS NOTES
Hospitalist Progress Note   Admit Date:  2024 10:40 AM   Name:  Kati Brandon   Age:  84 y.o.  Sex:  female  :  1940   MRN:  [id number]   Room:  [facility]    Presenting/Chief Complaint: Back Pain     Reason(s) for Admission: Compression fracture of C-spine, initial encounter ([facility]) [S12.9XXA]  Compression fracture of L1 vertebra, initial encounter ([facility]) [S32.010A]  Dementia with other behavioral disturbance, unspecified dementia severity, unspecified dementia type ([facility]) [F03.918]  Closed compression fracture of body of first lumbar vertebra ([facility]) [S32.010A]       Hospital Course:   Kati Brandon is a 84 y.o. female with medical history of myeloproliferative disorder, polycythemia vera, persistent A-fib, CKD 3, chronic heart failure, Parkinson's who presented from her orthopedic surgeons office due to uncontrolled pain.  Patient recently had a fall several days ago resulting in a compression fracture of L1.  Patient was sent home with Percocet and told to follow-up with orthopedic surgery.  She went to her surgeon's office today and was unable to tolerate the visit due to significant pain.  Given her uncontrolled pain and her worsening appetite, she was directed to come to the ER for further help.     In the ER, patient was given Dilaudid with some pain control.  Patient was unable to give much history due to her pain as well as her waxing/waning memory issues due to her Parkinson's.  Daughter at bedside provided most of the history.  Prior to her fall, patient was ambulatory and functional.  Her memory however has been worsening over the last several months.  She has been following with palliative care.  Patient to be admitted to hospital service for pain control as well as possible rehab placement.    Subjective & 24hr Events:   Patient seated in recliner, having just been assisted to that location by PT.  She states she wants to get back into bed due to back pain.  RN requested to provide  injection 5,000 Units  5,000 Units SubCUTAneous BID    ondansetron (ZOFRAN-ODT) disintegrating tablet 4 mg  4 mg Oral Q8H PRN    Or    ondansetron (ZOFRAN) injection 4 mg  4 mg IntraVENous Q6H PRN    polyethylene glycol (GLYCOLAX) packet 17 g  17 g Oral Daily PRN    acetaminophen (TYLENOL) tablet 650 mg  650 mg Oral Q6H PRN    Or    acetaminophen (TYLENOL) suppository 650 mg  650 mg Rectal Q6H PRN    oxyCODONE (ROXICODONE) immediate release tablet 10 mg  10 mg Oral Q4H PRN    morphine sulfate (PF) injection 4 mg  4 mg IntraVENous Q4H PRN    oxyCODONE (ROXICODONE) immediate release tablet 5 mg  5 mg Oral Q4H PRN    aspirin chewable tablet 81 mg  81 mg Oral Daily    empagliflozin (JARDIANCE) tablet 10 mg  10 mg Oral Daily    metoprolol succinate (TOPROL XL) extended release tablet 25 mg  25 mg Oral BID    [Held by provider] torsemide (DEMADEX) tablet 10 mg  10 mg Oral Daily    digoxin (LANOXIN) tablet 0.0625 mg  0.0625 mg Oral Q48H       Signed:  Shawn Cole M.D.  Hospitalist  12/09/24   6:40 AM

## 2024-12-09 NOTE — PROGRESS NOTES
ACUTE PHYSICAL THERAPY GOALS:   (Developed with and agreed upon by patient and/or caregiver.)     (1.) Kati Brandon  will move from supine to sit and sit to supine  with STAND BY ASSIST within 7 treatment day(s).    (2.) Kati Brandon will transfer from bed to chair and chair to bed with STAND BY ASSIST using the least restrictive device within 7 treatment day(s).    (3.) Kati Brandon will ambulate with STAND BY ASSIST for 150 feet with the least restrictive device within 7 treatment day(s).   (4.) Kati Brandon will perform standing static and dynamic balance activities x 10 minutes with STAND BY ASSIST to improve safety within 7 treatment day(s).  (5.) Kati Brandon will perform therapeutic exercises x 10 min for HEP with STAND BY ASSIST to improve strength, endurance, and functional mobility within 7 treatment day(s).     PHYSICAL THERAPY: Daily Note AM   (Link to Caseload Tracking: PT Visit Days : 2  Time In/Out PT Charge Capture  Rehab Caseload Tracker  Orders    Kati Brandon is a 84 y.o. female   PRIMARY DIAGNOSIS: Closed compression fracture of body of first lumbar vertebra (HCC)  Compression fracture of C-spine, initial encounter ([facility]) [S12.9XXA]  Compression fracture of L1 vertebra, initial encounter ([facility]) [S32.010A]  Dementia with other behavioral disturbance, unspecified dementia severity, unspecified dementia type ([facility]) [F03.918]  Closed compression fracture of body of first lumbar vertebra (HCC) [S32.010A]       Inpatient: Payor: [facility] MEDICARE / Plan: [facility] MEDICARE ADVANTAGE / Product Type: *No Product type* /     ASSESSMENT:     REHAB RECOMMENDATIONS:   Recommendation to date pending progress:  Setting:  Short-term Rehab    Equipment:    To Be Determined     ASSESSMENT:  Ms. Brandon was right sidelying on arrival and agreeable to therapy. Supine to sit on edge of bed with max A x2. TLSO donned while sitting. She ambulated 5' over to chair with min A x2. After resting, she was

## 2024-12-09 NOTE — PLAN OF CARE
Problem: Chronic Conditions and Co-morbidities  Goal: Patient's chronic conditions and co-morbidity symptoms are monitored and maintained or improved  12/9/2024 0835 by Odalis Patterson RN  Outcome: Progressing  12/8/2024 2337 by Ann Perdomo RN  Outcome: Progressing     Problem: Discharge Planning  Goal: Discharge to home or other facility with appropriate resources  12/9/2024 0835 by Odalis Patterson RN  Outcome: Progressing  12/8/2024 2337 by Ann Perdomo RN  Outcome: Progressing     Problem: Pain  Goal: Verbalizes/displays adequate comfort level or baseline comfort level  12/9/2024 0835 by Odalis Patterson RN  Outcome: Progressing  12/8/2024 2337 by Ann Perdomo RN  Outcome: Progressing     Problem: Safety - Adult  Goal: Free from fall injury  12/9/2024 0835 by Odalis Patterson RN  Outcome: Progressing  Flowsheets (Taken 12/9/2024 0708)  Free From Fall Injury: Instruct family/caregiver on patient safety  12/8/2024 2337 by Ann Perdomo RN  Outcome: Progressing     Problem: ABCDS Injury Assessment  Goal: Absence of physical injury  12/9/2024 0835 by Odalis Patterson RN  Outcome: Progressing  Flowsheets (Taken 12/9/2024 0708)  Absence of Physical Injury: Implement safety measures based on patient assessment  12/8/2024 2337 by Ann Perdomo RN  Outcome: Progressing     Problem: Skin/Tissue Integrity  Goal: Absence of new skin breakdown  Description: 1.  Monitor for areas of redness and/or skin breakdown  2.  Assess vascular access sites hourly  3.  Every 4-6 hours minimum:  Change oxygen saturation probe site  4.  Every 4-6 hours:  If on nasal continuous positive airway pressure, respiratory therapy assess nares and determine need for appliance change or resting period.  12/9/2024 0835 by Odalis Patterson RN  Outcome: Progressing  12/8/2024 2337 by Ann Perdomo RN  Outcome: Progressing     Problem: Confusion  Goal: Confusion,

## 2024-12-10 VITALS
DIASTOLIC BLOOD PRESSURE: 65 MMHG | HEIGHT: 62 IN | TEMPERATURE: 97.6 F | BODY MASS INDEX: 20.24 KG/M2 | HEART RATE: 89 BPM | OXYGEN SATURATION: 94 % | WEIGHT: 110 LBS | RESPIRATION RATE: 20 BRPM | SYSTOLIC BLOOD PRESSURE: 122 MMHG

## 2024-12-10 LAB
ANION GAP SERPL CALC-SCNC: 11 MMOL/L (ref 7–16)
BACTERIA SPEC CULT: NORMAL
BACTERIA SPEC CULT: NORMAL
BASOPHILS # BLD: 0.2 K/UL (ref 0–0.2)
BASOPHILS NFR BLD: 1 % (ref 0–2)
BUN SERPL-MCNC: 30 MG/DL (ref 8–23)
CALCIUM SERPL-MCNC: 8.7 MG/DL (ref 8.8–10.2)
CHLORIDE SERPL-SCNC: 105 MMOL/L (ref 98–107)
CO2 SERPL-SCNC: 20 MMOL/L (ref 20–29)
CREAT SERPL-MCNC: 1.69 MG/DL (ref 0.6–1.1)
DIFFERENTIAL METHOD BLD: ABNORMAL
EOSINOPHIL # BLD: 0.2 K/UL (ref 0–0.8)
EOSINOPHIL NFR BLD: 1 % (ref 0.5–7.8)
ERYTHROCYTE [DISTWIDTH] IN BLOOD BY AUTOMATED COUNT: 23.2 % (ref 11.9–14.6)
GLUCOSE SERPL-MCNC: 96 MG/DL (ref 70–99)
HCT VFR BLD AUTO: 50.5 % (ref 35.8–46.3)
HGB BLD-MCNC: 15.9 G/DL (ref 11.7–15.4)
IMM GRANULOCYTES # BLD AUTO: 0.4 K/UL (ref 0–0.5)
IMM GRANULOCYTES NFR BLD AUTO: 2 % (ref 0–5)
LYMPHOCYTES # BLD: 2.1 K/UL (ref 0.5–4.6)
LYMPHOCYTES NFR BLD: 8 % (ref 13–44)
MCH RBC QN AUTO: 28.9 PG (ref 26.1–32.9)
MCHC RBC AUTO-ENTMCNC: 31.5 G/DL (ref 31.4–35)
MCV RBC AUTO: 91.7 FL (ref 82–102)
MONOCYTES # BLD: 1.8 K/UL (ref 0.1–1.3)
MONOCYTES NFR BLD: 7 % (ref 4–12)
NEUTS SEG # BLD: 21.6 K/UL (ref 1.7–8.2)
NEUTS SEG NFR BLD: 82 % (ref 43–78)
NRBC # BLD: 0 K/UL (ref 0–0.2)
PLATELET # BLD AUTO: 590 K/UL (ref 150–450)
PMV BLD AUTO: 11 FL (ref 9.4–12.3)
POTASSIUM SERPL-SCNC: 5.2 MMOL/L (ref 3.5–5.1)
PROCALCITONIN SERPL-MCNC: 0.88 NG/ML (ref 0–0.1)
RBC # BLD AUTO: 5.51 M/UL (ref 4.05–5.2)
SERVICE CMNT-IMP: NORMAL
SERVICE CMNT-IMP: NORMAL
SODIUM SERPL-SCNC: 136 MMOL/L (ref 136–145)
WBC # BLD AUTO: 26.4 K/UL (ref 4.3–11.1)

## 2024-12-10 PROCEDURE — 36415 COLL VENOUS BLD VENIPUNCTURE: CPT

## 2024-12-10 PROCEDURE — 6370000000 HC RX 637 (ALT 250 FOR IP): Performed by: FAMILY MEDICINE

## 2024-12-10 PROCEDURE — 2580000003 HC RX 258: Performed by: INTERNAL MEDICINE

## 2024-12-10 PROCEDURE — 80048 BASIC METABOLIC PNL TOTAL CA: CPT

## 2024-12-10 PROCEDURE — 6370000000 HC RX 637 (ALT 250 FOR IP): Performed by: INTERNAL MEDICINE

## 2024-12-10 PROCEDURE — 85025 COMPLETE CBC W/AUTO DIFF WBC: CPT

## 2024-12-10 PROCEDURE — 6360000002 HC RX W HCPCS: Performed by: INTERNAL MEDICINE

## 2024-12-10 PROCEDURE — 84145 PROCALCITONIN (PCT): CPT

## 2024-12-10 RX ORDER — ERGOCALCIFEROL 1.25 MG/1
50000 CAPSULE, LIQUID FILLED ORAL WEEKLY
Qty: 12 CAPSULE | Refills: 0
Start: 2024-12-10 | End: 2025-03-10

## 2024-12-10 RX ORDER — DIGOXIN 0.06 MG/1
0.06 TABLET ORAL
Qty: 30 TABLET | Refills: 3
Start: 2024-12-11

## 2024-12-10 RX ORDER — SODIUM BICARBONATE 650 MG/1
650 TABLET ORAL 2 TIMES DAILY
Qty: 60 TABLET | Refills: 1
Start: 2024-12-10 | End: 2025-02-08

## 2024-12-10 RX ORDER — LIDOCAINE 4 G/G
1 PATCH TOPICAL DAILY
Qty: 20 PATCH | Refills: 0
Start: 2024-12-10

## 2024-12-10 RX ORDER — OXYCODONE HYDROCHLORIDE 5 MG/1
5 TABLET ORAL EVERY 4 HOURS PRN
Qty: 20 TABLET | Refills: 0 | Status: SHIPPED | OUTPATIENT
Start: 2024-12-10 | End: 2024-12-15

## 2024-12-10 RX ORDER — TORSEMIDE 10 MG/1
10 TABLET ORAL EVERY OTHER DAY
Qty: 45 TABLET | Refills: 3
Start: 2024-12-10 | End: 2025-12-10

## 2024-12-10 RX ORDER — SENNOSIDES A AND B 8.6 MG/1
2 TABLET, FILM COATED ORAL 2 TIMES DAILY
Qty: 120 TABLET | Refills: 0
Start: 2024-12-10 | End: 2025-01-09

## 2024-12-10 RX ADMIN — SENNOSIDES 17.2 MG: 8.6 TABLET, FILM COATED ORAL at 09:33

## 2024-12-10 RX ADMIN — OXYCODONE 5 MG: 5 TABLET ORAL at 13:56

## 2024-12-10 RX ADMIN — METOPROLOL SUCCINATE 25 MG: 25 TABLET, EXTENDED RELEASE ORAL at 09:34

## 2024-12-10 RX ADMIN — EMPAGLIFLOZIN 10 MG: 10 TABLET, FILM COATED ORAL at 09:35

## 2024-12-10 RX ADMIN — OXYCODONE 5 MG: 5 TABLET ORAL at 09:31

## 2024-12-10 RX ADMIN — SODIUM CHLORIDE, PRESERVATIVE FREE 10 ML: 5 INJECTION INTRAVENOUS at 09:36

## 2024-12-10 RX ADMIN — SODIUM BICARBONATE 650 MG TABLET 650 MG: at 09:34

## 2024-12-10 RX ADMIN — ASPIRIN 81 MG: 81 TABLET, CHEWABLE ORAL at 09:39

## 2024-12-10 RX ADMIN — ACETAMINOPHEN 650 MG: 325 TABLET ORAL at 09:32

## 2024-12-10 RX ADMIN — HEPARIN SODIUM 5000 UNITS: 5000 INJECTION INTRAVENOUS; SUBCUTANEOUS at 09:37

## 2024-12-10 ASSESSMENT — PAIN DESCRIPTION - DESCRIPTORS
DESCRIPTORS: DISCOMFORT;SORE
DESCRIPTORS: ACHING

## 2024-12-10 ASSESSMENT — PAIN DESCRIPTION - FREQUENCY
FREQUENCY: INTERMITTENT
FREQUENCY: INTERMITTENT

## 2024-12-10 ASSESSMENT — PAIN SCALES - GENERAL
PAINLEVEL_OUTOF10: 5
PAINLEVEL_OUTOF10: 5
PAINLEVEL_OUTOF10: 0

## 2024-12-10 ASSESSMENT — PAIN - FUNCTIONAL ASSESSMENT
PAIN_FUNCTIONAL_ASSESSMENT: PREVENTS OR INTERFERES SOME ACTIVE ACTIVITIES AND ADLS
PAIN_FUNCTIONAL_ASSESSMENT: PREVENTS OR INTERFERES SOME ACTIVE ACTIVITIES AND ADLS

## 2024-12-10 ASSESSMENT — PAIN DESCRIPTION - ORIENTATION
ORIENTATION: POSTERIOR
ORIENTATION: POSTERIOR

## 2024-12-10 ASSESSMENT — PAIN DESCRIPTION - LOCATION
LOCATION: BACK
LOCATION: BACK

## 2024-12-10 ASSESSMENT — PAIN DESCRIPTION - ONSET
ONSET: ON-GOING
ONSET: ON-GOING

## 2024-12-10 ASSESSMENT — PAIN DESCRIPTION - PAIN TYPE
TYPE: CHRONIC PAIN
TYPE: CHRONIC PAIN

## 2024-12-10 NOTE — PLAN OF CARE
Problem: Chronic Conditions and Co-morbidities  Goal: Patient's chronic conditions and co-morbidity symptoms are monitored and maintained or improved  12/9/2024 1922 by Fe Munguia RN  Outcome: Progressing  12/9/2024 0835 by Odalis Patterson RN  Outcome: Progressing     Problem: Discharge Planning  Goal: Discharge to home or other facility with appropriate resources  12/9/2024 1922 by Fe Munguia RN  Outcome: Progressing  12/9/2024 0835 by Odalis Patterson RN  Outcome: Progressing     Problem: Pain  Goal: Verbalizes/displays adequate comfort level or baseline comfort level  12/9/2024 1922 by Fe Munguia RN  Outcome: Progressing  12/9/2024 0835 by Odalis Patterson RN  Outcome: Progressing     Problem: Safety - Adult  Goal: Free from fall injury  12/9/2024 1922 by Fe Munguia RN  Outcome: Progressing  12/9/2024 0835 by Odalis Patterson RN  Outcome: Progressing  Flowsheets (Taken 12/9/2024 0708)  Free From Fall Injury: Instruct family/caregiver on patient safety     Problem: ABCDS Injury Assessment  Goal: Absence of physical injury  12/9/2024 1922 by Fe Munguia RN  Outcome: Progressing  12/9/2024 0835 by Odalis Patterson RN  Outcome: Progressing  Flowsheets (Taken 12/9/2024 0708)  Absence of Physical Injury: Implement safety measures based on patient assessment     Problem: Skin/Tissue Integrity  Goal: Absence of new skin breakdown  Description: 1.  Monitor for areas of redness and/or skin breakdown  2.  Assess vascular access sites hourly  3.  Every 4-6 hours minimum:  Change oxygen saturation probe site  4.  Every 4-6 hours:  If on nasal continuous positive airway pressure, respiratory therapy assess nares and determine need for appliance change or resting period.  12/9/2024 1922 by Fe Munguia RN  Outcome: Progressing  12/9/2024 0835 by Odalis Patterson RN  Outcome: Progressing     Problem: Confusion  Goal: Confusion, delirium,

## 2024-12-10 NOTE — CARE COORDINATION
Pt is for discharge today to [facility] as planned. Pt is assigned to Stevie Dickson, Report is [phone] Transport via [facility] around 1300 pm.  Packet prepared to go with pt to facility.  MSW spoke with Cathleen Enriquez (pt daughter) by phone with update on anticipated transport time.

## 2024-12-10 NOTE — DISCHARGE SUMMARY
cm ascending thoracic aortic aneurysm. 4. Multiple thyroid calcifications. Sonographic correlation recommended. 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 Electronically signed by SocialBro    XR THORACIC SPINE (3 VIEWS)    Result Date: 12/3/2024  As above Electronically signed by Konyque       Labs: Results:       BMP, Mg, Phos Recent Labs     12/08/24 0414 12/09/24  0501 12/09/24  0659 12/10/24  0358    134*  --  136   K 4.9 6.0* 5.3* 5.2*    102  --  105   CO2 20 19*  --  20   ANIONGAP 11 13  --  11   BUN 33* 31*  --  30*   CREATININE 1.82* 1.77*  --  1.69*   LABGLOM 27* 28*  --  29*   CALCIUM 8.4* 8.4*  --  8.7*   GLUCOSE 90 91  --  96      CBC Recent Labs     12/08/24 0414 12/09/24  0501 12/10/24  0358   WBC 28.6* 28.6* 26.4*   RBC 5.94* 5.76* 5.51*   HGB 16.4* 15.9* 15.9*   HCT 54.4* 52.0* 50.5*   MCV 91.6 90.3 91.7   MCH 27.6 27.6 28.9   MCHC 30.1* 30.6* 31.5   RDW 22.6* 22.7* 23.2*   * 546* 590*   MPV 11.2 11.2 11.0   NRBC 0.00 0.00 0.00   LYMPHOPCT 7* 7* 8*   MONOPCT 11 8 7   BASOPCT 1 1 1   IMMGRAN 2 2 2   LYMPHSABS 1.9 1.9 2.1   EOSABS 0.1 0.2 0.2   MONOSABS 3.2* 2.3* 1.8*   BASOSABS 0.2 0.3* 0.2   ABSIMMGRAN 0.7* 0.6* 0.4      LFT No results for input(s): \"BILITOT\", \"BILIDIR\", \"ALKPHOS\", \"AST\", \"ALT\", \"LABALBU\", \"GLOB\" in the last 72 hours.    Invalid input(s): \"PROT\"   Cardiac  Lab Results   Component Value Date/Time    TROPHS 27.0 12/28/2022 04:45 PM      Coags Lab Results   Component Value Date/Time    PROTIME 17.2 12/28/2022 10:08 PM    INR 1.4 12/28/2022 10:08 PM    APTT 35.8 12/28/2022 10:08 PM      A1c No results found for: \"LABA1C\", \"EAG\"   Lipids Lab Results   Component Value Date/Time    CHOL 100 11/27/2024 12:17 PM    HDL 72 11/27/2024 12:17 PM    CHOLHDLRATIO 1.4 11/27/2024 12:17 PM    TRIG 49 11/27/2024 12:17 PM      Thyroid  No results found for:

## 2024-12-15 NOTE — PROGRESS NOTES
Physician Progress Note      PATIENT:               MADIE TROTTER  [facility] #:                  [id number]  :                       1940  ADMIT DATE:       2024 10:40 AM  DISCH DATE:        12/10/2024 1:56 PM  RESPONDING  PROVIDER #:        Jelly Sousa MD          QUERY TEXT:    Patient admitted with closed compression fracture of body of first lumbar   vertebra. Noted documentation of Acute Kidney Injury.  In order to support the   diagnosis of ANGY, please include additional clinical indicators in your   documentation.? Or please document if the diagnosis of ANGY has been ruled out   after further study.  The medical record reflects the following:  Risk Factors: 84 y.o. female CKD 3b, HTN  Clinical Indicators: Patient was in observation -, Admitted inpatient   on  & Creatinine:1.82. Documented baseline of 1.4-1.5.  Treatment: Serial labs    Defined by Kidney Disease Improving Global Outcomes (KDIGO) clinical practice   guideline for acute kidney injury:  -Increase in SCr by greater than or equal to 0.3 mg/dl within 48 hours; or  -Increase or decrease in SCr to greater than or equal to 1.5 times baseline,   which is known or presumed to have occurred within the prior 7 days; or  -Urine volume < 0.5ml/kg/h for 6 hours.  Options provided:  -- Acute kidney injury ruled out after study  -- Acute kidney injury evidenced by, Please document evidence as well as a   numerical baseline creatinine, if known.  -- Other - I will add my own diagnosis  -- Disagree - Not applicable / Not valid  -- Disagree - Clinically unable to determine / Unknown  -- Refer to Clinical Documentation Reviewer    PROVIDER RESPONSE TEXT:    ANGY ON CKD-3    Query created by: Gwen Chou on 2024 10:09 AM      QUERY TEXT:    Patient admitted with compression fracture of L1 vertebra Noted documentation   of pneumonia, right lower lobe. In order to support the diagnosis of   pneumonia, right lower lobe, please

## 2025-01-30 ENCOUNTER — TELEPHONE (OUTPATIENT)
Age: 85
End: 2025-01-30

## 2025-01-30 NOTE — TELEPHONE ENCOUNTER
I am not sure if you are aware but on Nov. 30,2024 my mom, Kati Brandon, fell and broke her L1 because of this she has had several visits in and out of the hospital until she finally came home with Hospice care on December 23, 2024. The care team with hospice care still has her on all the same 5 medications prescribed by y'all since her diagnosis with congestive heart failure. She eats very small amounts of food, all pureed, drinks little fluids and is bedridden. My question is which of the medications she is on could be potentially causing her more harm than good at this point because of her lack of eating and drinking liquids. I know the heart related and fluid medicines keep her heart regulated but my concern is with for example the jaundice. She is not drinking enough liquids and beginning to get several bed sores. Are any of the medicines worth discontinuing?   Thank you,  Cathleen Enriquez

## 2025-01-30 NOTE — TELEPHONE ENCOUNTER
Daughter notified of Dr. Jacobson's response per Melvin//cassie  It is difficult for me to say without seeing her.  I certainly think stopping her Jardiance would make sense.  Would asked the hospice physician to evaluate whether they think that she should continue her Demadex which is her diuretic.  I would continue Toprol and her aspirin if possible.

## (undated) DEVICE — STERILE POLYISOPRENE POWDER-FREE SURGICAL GLOVES: Brand: PROTEXIS

## (undated) DEVICE — KIT THORCENT 8FR L5IN POLYUR W/ 18/22/25GA NDL 3 W STPCOCK

## (undated) DEVICE — GLOVE ORANGE PI 7 1/2   MSG9075